# Patient Record
Sex: FEMALE | Race: WHITE | NOT HISPANIC OR LATINO | Employment: FULL TIME | URBAN - METROPOLITAN AREA
[De-identification: names, ages, dates, MRNs, and addresses within clinical notes are randomized per-mention and may not be internally consistent; named-entity substitution may affect disease eponyms.]

---

## 2017-01-24 ENCOUNTER — COMMUNICATION - HEALTHEAST (OUTPATIENT)
Dept: FAMILY MEDICINE | Facility: CLINIC | Age: 52
End: 2017-01-24

## 2017-03-09 ENCOUNTER — COMMUNICATION - HEALTHEAST (OUTPATIENT)
Dept: SCHEDULING | Facility: CLINIC | Age: 52
End: 2017-03-09

## 2018-01-04 ENCOUNTER — RECORDS - HEALTHEAST (OUTPATIENT)
Dept: ADMINISTRATIVE | Facility: OTHER | Age: 53
End: 2018-01-04

## 2018-01-17 ENCOUNTER — HOSPITAL ENCOUNTER (OUTPATIENT)
Dept: MAMMOGRAPHY | Facility: HOSPITAL | Age: 53
Discharge: HOME | End: 2018-01-17

## 2018-01-17 DIAGNOSIS — Z12.31 VISIT FOR SCREENING MAMMOGRAM: ICD-10-CM

## 2019-06-03 ENCOUNTER — COMMUNICATION - HEALTHEAST (OUTPATIENT)
Dept: FAMILY MEDICINE | Facility: CLINIC | Age: 54
End: 2019-06-03

## 2019-06-03 ENCOUNTER — COMMUNICATION - HEALTHEAST (OUTPATIENT)
Dept: SCHEDULING | Facility: CLINIC | Age: 54
End: 2019-06-03

## 2019-06-03 ENCOUNTER — OFFICE VISIT - HEALTHEAST (OUTPATIENT)
Dept: FAMILY MEDICINE | Facility: CLINIC | Age: 54
End: 2019-06-03

## 2019-06-03 DIAGNOSIS — R10.11 RUQ ABDOMINAL PAIN: ICD-10-CM

## 2019-06-03 DIAGNOSIS — Z12.31 VISIT FOR SCREENING MAMMOGRAM: ICD-10-CM

## 2019-06-03 DIAGNOSIS — K58.0 IRRITABLE BOWEL SYNDROME WITH DIARRHEA: ICD-10-CM

## 2019-06-03 DIAGNOSIS — R63.5 WEIGHT GAIN: ICD-10-CM

## 2019-06-03 DIAGNOSIS — Z87.442 HISTORY OF KIDNEY STONES: ICD-10-CM

## 2019-06-03 DIAGNOSIS — Z12.11 SCREENING FOR COLON CANCER: ICD-10-CM

## 2019-06-03 DIAGNOSIS — R10.9 RIGHT FLANK PAIN: ICD-10-CM

## 2019-06-03 DIAGNOSIS — Z87.19 HISTORY OF BILIARY COLIC: ICD-10-CM

## 2019-06-03 LAB
ALBUMIN SERPL-MCNC: 3.7 G/DL (ref 3.5–5)
ALBUMIN UR-MCNC: ABNORMAL MG/DL
ALP SERPL-CCNC: 160 U/L (ref 45–120)
ALT SERPL W P-5'-P-CCNC: 23 U/L (ref 0–45)
ANION GAP SERPL CALCULATED.3IONS-SCNC: 7 MMOL/L (ref 5–18)
APPEARANCE UR: ABNORMAL
AST SERPL W P-5'-P-CCNC: 17 U/L (ref 0–40)
BACTERIA #/AREA URNS HPF: ABNORMAL HPF
BASOPHILS # BLD AUTO: 0.1 THOU/UL (ref 0–0.2)
BASOPHILS NFR BLD AUTO: 1 % (ref 0–2)
BILIRUB SERPL-MCNC: 0.3 MG/DL (ref 0–1)
BILIRUB UR QL STRIP: ABNORMAL
BUN SERPL-MCNC: 14 MG/DL (ref 8–22)
CALCIUM SERPL-MCNC: 9.6 MG/DL (ref 8.5–10.5)
CHLORIDE BLD-SCNC: 105 MMOL/L (ref 98–107)
CO2 SERPL-SCNC: 27 MMOL/L (ref 22–31)
COLOR UR AUTO: ABNORMAL
CREAT SERPL-MCNC: 0.77 MG/DL (ref 0.6–1.1)
EOSINOPHIL # BLD AUTO: 0.2 THOU/UL (ref 0–0.4)
EOSINOPHIL NFR BLD AUTO: 2 % (ref 0–6)
ERYTHROCYTE [DISTWIDTH] IN BLOOD BY AUTOMATED COUNT: 11.9 % (ref 11–14.5)
GFR SERPL CREATININE-BSD FRML MDRD: >60 ML/MIN/1.73M2
GLUCOSE BLD-MCNC: 97 MG/DL (ref 70–125)
GLUCOSE UR STRIP-MCNC: NEGATIVE MG/DL
HCT VFR BLD AUTO: 40.8 % (ref 35–47)
HGB BLD-MCNC: 13.7 G/DL (ref 12–16)
HGB UR QL STRIP: ABNORMAL
KETONES UR STRIP-MCNC: ABNORMAL MG/DL
LEUKOCYTE ESTERASE UR QL STRIP: ABNORMAL
LIPASE SERPL-CCNC: 125 U/L (ref 0–52)
LYMPHOCYTES # BLD AUTO: 2.3 THOU/UL (ref 0.8–4.4)
LYMPHOCYTES NFR BLD AUTO: 24 % (ref 20–40)
MCH RBC QN AUTO: 29.6 PG (ref 27–34)
MCHC RBC AUTO-ENTMCNC: 33.4 G/DL (ref 32–36)
MCV RBC AUTO: 88 FL (ref 80–100)
MONOCYTES # BLD AUTO: 0.6 THOU/UL (ref 0–0.9)
MONOCYTES NFR BLD AUTO: 6 % (ref 2–10)
NEUTROPHILS # BLD AUTO: 6.6 THOU/UL (ref 2–7.7)
NEUTROPHILS NFR BLD AUTO: 68 % (ref 50–70)
NITRATE UR QL: NEGATIVE
PH UR STRIP: 5.5 [PH] (ref 5–8)
PLATELET # BLD AUTO: 204 THOU/UL (ref 140–440)
PMV BLD AUTO: 9.1 FL (ref 7–10)
POTASSIUM BLD-SCNC: 3.6 MMOL/L (ref 3.5–5)
PROT SERPL-MCNC: 7.2 G/DL (ref 6–8)
RBC # BLD AUTO: 4.62 MILL/UL (ref 3.8–5.4)
RBC #/AREA URNS AUTO: >100 HPF
SODIUM SERPL-SCNC: 139 MMOL/L (ref 136–145)
SP GR UR STRIP: >=1.03 (ref 1–1.03)
SQUAMOUS #/AREA URNS AUTO: ABNORMAL LPF
TSH SERPL DL<=0.005 MIU/L-ACNC: 2.97 UIU/ML (ref 0.3–5)
UROBILINOGEN UR STRIP-ACNC: ABNORMAL
WBC #/AREA URNS AUTO: ABNORMAL HPF
WBC: 9.7 THOU/UL (ref 4–11)

## 2019-06-05 ENCOUNTER — COMMUNICATION - HEALTHEAST (OUTPATIENT)
Dept: FAMILY MEDICINE | Facility: CLINIC | Age: 54
End: 2019-06-05

## 2019-06-05 ENCOUNTER — AMBULATORY - HEALTHEAST (OUTPATIENT)
Dept: FAMILY MEDICINE | Facility: CLINIC | Age: 54
End: 2019-06-05

## 2019-06-05 ENCOUNTER — AMBULATORY - HEALTHEAST (OUTPATIENT)
Dept: UROLOGY | Facility: CLINIC | Age: 54
End: 2019-06-05

## 2019-06-05 ENCOUNTER — HOSPITAL ENCOUNTER (OUTPATIENT)
Dept: CT IMAGING | Facility: CLINIC | Age: 54
Discharge: HOME | End: 2019-06-05
Attending: FAMILY MEDICINE

## 2019-06-05 ENCOUNTER — HOSPITAL ENCOUNTER (OUTPATIENT)
Dept: ULTRASOUND IMAGING | Facility: CLINIC | Age: 54
Discharge: HOME | End: 2019-06-05
Attending: FAMILY MEDICINE

## 2019-06-05 DIAGNOSIS — N20.0 NEPHROLITHIASIS: ICD-10-CM

## 2019-06-05 DIAGNOSIS — R10.9 RIGHT FLANK PAIN: ICD-10-CM

## 2019-06-05 DIAGNOSIS — R82.71 GROUP B STREPTOCOCCAL BACTERIURIA: ICD-10-CM

## 2019-06-06 ENCOUNTER — OFFICE VISIT - HEALTHEAST (OUTPATIENT)
Dept: UROLOGY | Facility: CLINIC | Age: 54
End: 2019-06-06

## 2019-06-06 DIAGNOSIS — Z87.442 HISTORY OF KIDNEY STONES: ICD-10-CM

## 2019-06-06 DIAGNOSIS — N20.1 CALCULUS OF URETER: ICD-10-CM

## 2019-06-06 DIAGNOSIS — N30.01 ACUTE CYSTITIS WITH HEMATURIA: ICD-10-CM

## 2019-06-06 DIAGNOSIS — N13.2 HYDRONEPHROSIS WITH URINARY OBSTRUCTION DUE TO URETERAL CALCULUS: ICD-10-CM

## 2019-06-06 DIAGNOSIS — N20.0 NEPHROLITHIASIS: ICD-10-CM

## 2019-06-06 LAB
ALBUMIN UR-MCNC: ABNORMAL MG/DL
APPEARANCE UR: ABNORMAL
BILIRUB UR QL STRIP: NEGATIVE
COLOR UR AUTO: YELLOW
GLUCOSE UR STRIP-MCNC: NEGATIVE MG/DL
HGB UR QL STRIP: ABNORMAL
KETONES UR STRIP-MCNC: NEGATIVE MG/DL
LEUKOCYTE ESTERASE UR QL STRIP: ABNORMAL
NITRATE UR QL: NEGATIVE
PH UR STRIP: 6 [PH] (ref 5–8)
SP GR UR STRIP: 1.02 (ref 1–1.03)
UROBILINOGEN UR STRIP-ACNC: ABNORMAL

## 2019-06-07 ENCOUNTER — OFFICE VISIT - HEALTHEAST (OUTPATIENT)
Dept: SURGERY | Facility: CLINIC | Age: 54
End: 2019-06-07

## 2019-06-07 DIAGNOSIS — K81.9 CHOLECYSTITIS: ICD-10-CM

## 2019-06-07 DIAGNOSIS — N20.0 KIDNEY STONES: ICD-10-CM

## 2019-06-07 DIAGNOSIS — K85.10 ACUTE BILIARY PANCREATITIS WITHOUT INFECTION OR NECROSIS: ICD-10-CM

## 2019-06-07 ASSESSMENT — MIFFLIN-ST. JEOR: SCORE: 1553.9

## 2019-06-08 LAB — BACTERIA SPEC CULT: ABNORMAL

## 2019-06-10 ENCOUNTER — HOSPITAL ENCOUNTER (OUTPATIENT)
Dept: MAMMOGRAPHY | Facility: CLINIC | Age: 54
Discharge: HOME | End: 2019-06-10
Attending: FAMILY MEDICINE

## 2019-06-10 DIAGNOSIS — Z12.31 VISIT FOR SCREENING MAMMOGRAM: ICD-10-CM

## 2019-06-11 ENCOUNTER — SURGERY - HEALTHEAST (OUTPATIENT)
Dept: SURGERY | Facility: CLINIC | Age: 54
End: 2019-06-11

## 2019-06-11 ENCOUNTER — ANESTHESIA - HEALTHEAST (OUTPATIENT)
Dept: SURGERY | Facility: CLINIC | Age: 54
End: 2019-06-11

## 2019-06-11 ASSESSMENT — MIFFLIN-ST. JEOR: SCORE: 1549.36

## 2019-06-17 ENCOUNTER — COMMUNICATION - HEALTHEAST (OUTPATIENT)
Dept: SCHEDULING | Facility: CLINIC | Age: 54
End: 2019-06-17

## 2019-06-17 ENCOUNTER — AMBULATORY - HEALTHEAST (OUTPATIENT)
Dept: UROLOGY | Facility: CLINIC | Age: 54
End: 2019-06-17

## 2019-06-17 DIAGNOSIS — N20.1 CALCULUS OF URETER: ICD-10-CM

## 2019-06-17 LAB
ALBUMIN UR-MCNC: ABNORMAL MG/DL
APPEARANCE UR: ABNORMAL
BILIRUB UR QL STRIP: ABNORMAL
COLOR UR AUTO: ABNORMAL
GLUCOSE UR STRIP-MCNC: NEGATIVE MG/DL
HGB UR QL STRIP: ABNORMAL
KETONES UR STRIP-MCNC: ABNORMAL MG/DL
LEUKOCYTE ESTERASE UR QL STRIP: ABNORMAL
NITRATE UR QL: NEGATIVE
PH UR STRIP: 7 [PH] (ref 5–8)
SP GR UR STRIP: 1.02 (ref 1–1.03)
UROBILINOGEN UR STRIP-ACNC: ABNORMAL

## 2019-06-18 LAB — BACTERIA SPEC CULT: NO GROWTH

## 2019-07-05 ENCOUNTER — COMMUNICATION - HEALTHEAST (OUTPATIENT)
Dept: UROLOGY | Facility: CLINIC | Age: 54
End: 2019-07-05

## 2019-07-11 ENCOUNTER — OFFICE VISIT - HEALTHEAST (OUTPATIENT)
Dept: UROLOGY | Facility: CLINIC | Age: 54
End: 2019-07-11

## 2019-07-11 ENCOUNTER — AMBULATORY - HEALTHEAST (OUTPATIENT)
Dept: LAB | Facility: CLINIC | Age: 54
End: 2019-07-11

## 2019-07-11 DIAGNOSIS — N20.1 CALCULUS OF URETER: ICD-10-CM

## 2019-07-11 LAB
ALBUMIN UR-MCNC: NEGATIVE MG/DL
APPEARANCE UR: CLEAR
BILIRUB UR QL STRIP: NEGATIVE
COLOR UR AUTO: YELLOW
GLUCOSE UR STRIP-MCNC: NEGATIVE MG/DL
HGB UR QL STRIP: ABNORMAL
KETONES UR STRIP-MCNC: ABNORMAL MG/DL
LEUKOCYTE ESTERASE UR QL STRIP: NEGATIVE
NITRATE UR QL: NEGATIVE
PH UR STRIP: 5 [PH] (ref 5–8)
SP GR UR STRIP: >=1.03 (ref 1–1.03)
UROBILINOGEN UR STRIP-ACNC: ABNORMAL

## 2019-07-15 ENCOUNTER — COMMUNICATION - HEALTHEAST (OUTPATIENT)
Dept: UROLOGY | Facility: CLINIC | Age: 54
End: 2019-07-15

## 2019-07-15 ENCOUNTER — AMBULATORY - HEALTHEAST (OUTPATIENT)
Dept: LAB | Facility: CLINIC | Age: 54
End: 2019-07-15

## 2019-07-15 DIAGNOSIS — N20.1 CALCULUS OF URETER: ICD-10-CM

## 2019-07-16 LAB
APPEARANCE STONE: NORMAL
COMPN STONE: NORMAL
NUMBER STONE: 1
SIZE STONE: NORMAL MM
SPECIMEN WT: 20 MG

## 2019-08-19 ENCOUNTER — AMBULATORY - HEALTHEAST (OUTPATIENT)
Dept: LAB | Facility: CLINIC | Age: 54
End: 2019-08-19

## 2019-08-19 DIAGNOSIS — N20.1 CALCULUS OF URETER: ICD-10-CM

## 2019-08-19 LAB
CALCIUM 24H UR-MRATE: 342 MG/24HR (ref 20–275)
CHLORIDE 24H UR-SRATE: 103 MMOL/24HR (ref 110–250)
CITRATE 24H UR-MCNC: 726 MG/24HR
CREATININE, 24 HR URINE - HISTORICAL: 1134.4 MG/24HR
MAGNESIUM 24H UR-MRATE: 106 MG/24 HR (ref 75–150)
MAGNESIUM UR-MCNC: 7.7 MG/DL
OXALATE MG/SPEC: 21.9 MG/24HR (ref 7–44)
PH UR STRIP: 6 [PH] (ref 4.5–8)
PHOSPHORUS URINE MG/SPEC: 776.9 MG/24HR
POTASSIUM 24H UR-SCNC: 33 MMOL/24HR (ref 30–90)
SODIUM 24H UR-SRATE: 110 MMOL/24HR (ref 40–217)
SPECIMEN VOL UR: 1375 ML
SPECIMEN VOL UR: 1375 ML
URIC ACID URINE MG/SPEC: 441 MG/24HR (ref 250–750)

## 2019-09-23 ENCOUNTER — AMBULATORY - HEALTHEAST (OUTPATIENT)
Dept: LAB | Facility: CLINIC | Age: 54
End: 2019-09-23

## 2019-09-23 DIAGNOSIS — N20.1 CALCULUS OF URETER: ICD-10-CM

## 2019-09-23 LAB
MAGNESIUM 24H UR-MRATE: 103 MG/24 HR (ref 75–150)
MAGNESIUM UR-MCNC: 8.2 MG/DL
SPECIMEN VOL UR: 1250 ML

## 2019-09-24 LAB
CALCIUM 24H UR-MRATE: 326 MG/24HR (ref 20–275)
CHLORIDE 24H UR-SRATE: 143 MMOL/24HR (ref 110–250)
CITRATE 24H UR-MCNC: 527 MG/24HR
CREATININE, 24 HR URINE - HISTORICAL: 1032.5 MG/24HR
OXALATE MG/SPEC: 19.6 MG/24HR (ref 7–44)
PH UR STRIP: 6.5 [PH] (ref 4.5–8)
PHOSPHORUS URINE MG/SPEC: 492.5 MG/24HR
POTASSIUM 24H UR-SCNC: 34 MMOL/24HR (ref 30–90)
SODIUM 24H UR-SRATE: 145 MMOL/24HR (ref 40–217)
SPECIMEN VOL UR: 1250 ML
URIC ACID URINE MG/SPEC: 383 MG/24HR (ref 250–750)

## 2019-10-10 ENCOUNTER — OFFICE VISIT - HEALTHEAST (OUTPATIENT)
Dept: UROLOGY | Facility: CLINIC | Age: 54
End: 2019-10-10

## 2019-10-10 DIAGNOSIS — N20.0 CALCULUS OF KIDNEY: ICD-10-CM

## 2019-10-10 DIAGNOSIS — R34 LOW URINE OUTPUT: ICD-10-CM

## 2019-10-10 DIAGNOSIS — R82.998 HIGH URINE SODIUM: ICD-10-CM

## 2019-10-10 DIAGNOSIS — R82.994 HYPERCALCIURIA: ICD-10-CM

## 2019-10-10 LAB
ALBUMIN UR-MCNC: NEGATIVE MG/DL
APPEARANCE UR: CLEAR
BILIRUB UR QL STRIP: NEGATIVE
COLOR UR AUTO: YELLOW
GLUCOSE UR STRIP-MCNC: NEGATIVE MG/DL
HGB UR QL STRIP: ABNORMAL
KETONES UR STRIP-MCNC: NEGATIVE MG/DL
LEUKOCYTE ESTERASE UR QL STRIP: NEGATIVE
NITRATE UR QL: NEGATIVE
PH UR STRIP: 6 [PH] (ref 5–8)
SP GR UR STRIP: 1.02 (ref 1–1.03)
UROBILINOGEN UR STRIP-ACNC: ABNORMAL

## 2020-01-02 ENCOUNTER — COMMUNICATION - HEALTHEAST (OUTPATIENT)
Dept: FAMILY MEDICINE | Facility: CLINIC | Age: 55
End: 2020-01-02

## 2020-01-02 DIAGNOSIS — Z12.11 SCREENING FOR COLON CANCER: ICD-10-CM

## 2020-02-10 ENCOUNTER — COMMUNICATION - HEALTHEAST (OUTPATIENT)
Dept: SCHEDULING | Facility: CLINIC | Age: 55
End: 2020-02-10

## 2020-02-10 ENCOUNTER — OFFICE VISIT - HEALTHEAST (OUTPATIENT)
Dept: FAMILY MEDICINE | Facility: CLINIC | Age: 55
End: 2020-02-10

## 2020-02-10 DIAGNOSIS — I49.9 IRREGULAR HEART BEAT: ICD-10-CM

## 2020-02-11 ENCOUNTER — COMMUNICATION - HEALTHEAST (OUTPATIENT)
Dept: FAMILY MEDICINE | Facility: CLINIC | Age: 55
End: 2020-02-11

## 2020-02-11 DIAGNOSIS — R00.2 PALPITATIONS: ICD-10-CM

## 2020-02-11 DIAGNOSIS — R00.0 RACING HEART BEAT: ICD-10-CM

## 2020-02-11 DIAGNOSIS — K58.0 IRRITABLE BOWEL SYNDROME WITH DIARRHEA: ICD-10-CM

## 2020-02-11 DIAGNOSIS — Z87.19 HISTORY OF BILIARY COLIC: ICD-10-CM

## 2020-02-11 DIAGNOSIS — Z12.11 SCREENING FOR COLON CANCER: ICD-10-CM

## 2020-02-11 DIAGNOSIS — R63.5 WEIGHT GAIN: ICD-10-CM

## 2020-02-11 DIAGNOSIS — Z87.442 HISTORY OF KIDNEY STONES: ICD-10-CM

## 2020-02-11 LAB
ATRIAL RATE - MUSE: 62 BPM
DIASTOLIC BLOOD PRESSURE - MUSE: NORMAL
INTERPRETATION ECG - MUSE: NORMAL
P AXIS - MUSE: -4 DEGREES
PR INTERVAL - MUSE: 168 MS
QRS DURATION - MUSE: 86 MS
QT - MUSE: 418 MS
QTC - MUSE: 424 MS
R AXIS - MUSE: 9 DEGREES
SYSTOLIC BLOOD PRESSURE - MUSE: NORMAL
T AXIS - MUSE: 14 DEGREES
VENTRICULAR RATE- MUSE: 62 BPM

## 2020-02-17 ENCOUNTER — HOSPITAL ENCOUNTER (OUTPATIENT)
Dept: CARDIOLOGY | Facility: CLINIC | Age: 55
Discharge: HOME | End: 2020-02-17
Attending: FAMILY MEDICINE

## 2020-02-17 ENCOUNTER — RECORDS - HEALTHEAST (OUTPATIENT)
Dept: ADMINISTRATIVE | Facility: OTHER | Age: 55
End: 2020-02-17

## 2020-02-17 DIAGNOSIS — R00.2 PALPITATIONS: ICD-10-CM

## 2020-02-17 DIAGNOSIS — R00.0 RACING HEART BEAT: ICD-10-CM

## 2020-02-19 ENCOUNTER — COMMUNICATION - HEALTHEAST (OUTPATIENT)
Dept: FAMILY MEDICINE | Facility: CLINIC | Age: 55
End: 2020-02-19

## 2020-02-19 DIAGNOSIS — I48.92 ATRIAL FLUTTER, UNSPECIFIED TYPE (H): ICD-10-CM

## 2020-02-20 ENCOUNTER — AMBULATORY - HEALTHEAST (OUTPATIENT)
Dept: LAB | Facility: CLINIC | Age: 55
End: 2020-02-20

## 2020-02-20 DIAGNOSIS — I48.92 ATRIAL FLUTTER, UNSPECIFIED TYPE (H): ICD-10-CM

## 2020-02-20 DIAGNOSIS — R73.09 ELEVATED GLUCOSE: ICD-10-CM

## 2020-02-20 LAB
ANION GAP SERPL CALCULATED.3IONS-SCNC: 8 MMOL/L (ref 5–18)
BUN SERPL-MCNC: 11 MG/DL (ref 8–22)
CALCIUM SERPL-MCNC: 9.2 MG/DL (ref 8.5–10.5)
CHLORIDE BLD-SCNC: 107 MMOL/L (ref 98–107)
CO2 SERPL-SCNC: 25 MMOL/L (ref 22–31)
CREAT SERPL-MCNC: 0.88 MG/DL (ref 0.6–1.1)
ERYTHROCYTE [DISTWIDTH] IN BLOOD BY AUTOMATED COUNT: 13.3 % (ref 11–14.5)
FERRITIN SERPL-MCNC: 40 NG/ML (ref 10–130)
GFR SERPL CREATININE-BSD FRML MDRD: >60 ML/MIN/1.73M2
GLUCOSE BLD-MCNC: 133 MG/DL (ref 70–125)
HCT VFR BLD AUTO: 40.2 % (ref 35–47)
HGB BLD-MCNC: 13.1 G/DL (ref 12–16)
MCH RBC QN AUTO: 29.5 PG (ref 27–34)
MCHC RBC AUTO-ENTMCNC: 32.6 G/DL (ref 32–36)
MCV RBC AUTO: 91 FL (ref 80–100)
PLATELET # BLD AUTO: 198 THOU/UL (ref 140–440)
PMV BLD AUTO: 11.4 FL (ref 8.5–12.5)
POTASSIUM BLD-SCNC: 4.3 MMOL/L (ref 3.5–5)
RBC # BLD AUTO: 4.44 MILL/UL (ref 3.8–5.4)
SODIUM SERPL-SCNC: 140 MMOL/L (ref 136–145)
TSH SERPL DL<=0.005 MIU/L-ACNC: 4.52 UIU/ML (ref 0.3–5)
WBC: 4.7 THOU/UL (ref 4–11)

## 2020-02-21 ENCOUNTER — OFFICE VISIT - HEALTHEAST (OUTPATIENT)
Dept: CARDIOLOGY | Facility: CLINIC | Age: 55
End: 2020-02-21

## 2020-02-21 DIAGNOSIS — I48.92 ATRIAL FLUTTER (H): ICD-10-CM

## 2020-02-21 LAB — HBA1C MFR BLD: 5.4 % (ref 4.2–6.1)

## 2020-02-21 ASSESSMENT — MIFFLIN-ST. JEOR: SCORE: 1581.11

## 2020-02-24 ENCOUNTER — HOSPITAL ENCOUNTER (OUTPATIENT)
Dept: CARDIOLOGY | Facility: CLINIC | Age: 55
Discharge: HOME | End: 2020-02-24
Attending: INTERNAL MEDICINE

## 2020-02-24 DIAGNOSIS — I48.92 ATRIAL FLUTTER (H): ICD-10-CM

## 2020-02-24 ASSESSMENT — MIFFLIN-ST. JEOR: SCORE: 1581.11

## 2020-02-25 ENCOUNTER — COMMUNICATION - HEALTHEAST (OUTPATIENT)
Dept: CARDIOLOGY | Facility: CLINIC | Age: 55
End: 2020-02-25

## 2020-02-25 LAB
AORTIC ROOT: 3.3 CM
BSA FOR ECHO PROCEDURE: 2.11 M2
CV BLOOD PRESSURE: ABNORMAL MMHG
CV ECHO HEIGHT: 65 IN
CV ECHO WEIGHT: 215 LBS
DOP CALC LVOT AREA: 2.83 CM2
DOP CALC LVOT DIAMETER: 1.9 CM
DOP CALC LVOT PEAK VEL: 104 CM/S
DOP CALC LVOT STROKE VOLUME: 66.9 CM3
DOP CALCLVOT PEAK VEL VTI: 23.6 CM
EJECTION FRACTION: 58 % (ref 55–75)
FRACTIONAL SHORTENING: 38.3 % (ref 28–44)
INTERVENTRICULAR SEPTUM IN END DIASTOLE: 0.8 CM (ref 0.6–0.9)
IVS/PW RATIO: 1.1
LA AREA 1: 17.8 CM2
LA AREA 2: 18.6 CM2
LEFT ATRIUM LENGTH: 5.34 CM
LEFT ATRIUM SIZE: 3.8 CM
LEFT ATRIUM TO AORTIC ROOT RATIO: 1.15 NO UNITS
LEFT ATRIUM VOLUME INDEX: 25 ML/M2
LEFT ATRIUM VOLUME: 52.7 ML
LEFT VENTRICLE CARDIAC INDEX: 2.2 L/MIN/M2
LEFT VENTRICLE CARDIAC OUTPUT: 4.7 L/MIN
LEFT VENTRICLE DIASTOLIC VOLUME INDEX: 61.1 CM3/M2 (ref 29–61)
LEFT VENTRICLE DIASTOLIC VOLUME: 129 CM3 (ref 46–106)
LEFT VENTRICLE HEART RATE: 70 BPM
LEFT VENTRICLE MASS INDEX: 53.3 G/M2
LEFT VENTRICLE SYSTOLIC VOLUME INDEX: 25.6 CM3/M2 (ref 8–24)
LEFT VENTRICLE SYSTOLIC VOLUME: 54 CM3 (ref 14–42)
LEFT VENTRICULAR INTERNAL DIMENSION IN DIASTOLE: 4.7 CM (ref 3.8–5.2)
LEFT VENTRICULAR INTERNAL DIMENSION IN SYSTOLE: 2.9 CM (ref 2.2–3.5)
LEFT VENTRICULAR MASS: 112.5 G
LEFT VENTRICULAR OUTFLOW TRACT MEAN GRADIENT: 2 MMHG
LEFT VENTRICULAR OUTFLOW TRACT MEAN VELOCITY: 71.6 CM/S
LEFT VENTRICULAR OUTFLOW TRACT PEAK GRADIENT: 4 MMHG
LEFT VENTRICULAR POSTERIOR WALL IN END DIASTOLE: 0.7 CM (ref 0.6–0.9)
LV STROKE VOLUME INDEX: 31.7 ML/M2
MITRAL VALVE E/A RATIO: 1.4
MV AVERAGE E/E' RATIO: 4.8 CM/S
MV DECELERATION TIME: 165 MS
MV E'TISSUE VEL-LAT: 14.7 CM/S
MV E'TISSUE VEL-MED: 11.2 CM/S
MV LATERAL E/E' RATIO: 4.2
MV MEDIAL E/E' RATIO: 5.5
MV PEAK A VELOCITY: 45.2 CM/S
MV PEAK E VELOCITY: 61.7 CM/S
NUC REST DIASTOLIC VOLUME INDEX: 3440 LBS
NUC REST SYSTOLIC VOLUME INDEX: 65 IN
TRICUSPID REGURGITATION PEAK PRESSURE GRADIENT: 18.8 MMHG
TRICUSPID VALVE ANULAR PLANE SYSTOLIC EXCURSION: 2.3 CM
TRICUSPID VALVE PEAK REGURGITANT VELOCITY: 217 CM/S

## 2020-03-05 ENCOUNTER — OFFICE VISIT - HEALTHEAST (OUTPATIENT)
Dept: CARDIOLOGY | Facility: CLINIC | Age: 55
End: 2020-03-05

## 2020-03-05 DIAGNOSIS — I48.0 PAROXYSMAL ATRIAL FIBRILLATION (H): ICD-10-CM

## 2020-05-12 ENCOUNTER — COMMUNICATION - HEALTHEAST (OUTPATIENT)
Dept: FAMILY MEDICINE | Facility: CLINIC | Age: 55
End: 2020-05-12

## 2020-05-12 DIAGNOSIS — B02.9 HERPES ZOSTER WITHOUT COMPLICATION: ICD-10-CM

## 2020-10-19 ENCOUNTER — OFFICE VISIT - HEALTHEAST (OUTPATIENT)
Dept: CARDIOLOGY | Facility: CLINIC | Age: 55
End: 2020-10-19

## 2020-10-19 DIAGNOSIS — I48.0 PAROXYSMAL ATRIAL FIBRILLATION (H): ICD-10-CM

## 2020-10-19 ASSESSMENT — MIFFLIN-ST. JEOR: SCORE: 1589.28

## 2020-11-21 ENCOUNTER — RECORDS - HEALTHEAST (OUTPATIENT)
Dept: ADMINISTRATIVE | Facility: OTHER | Age: 55
End: 2020-11-21

## 2020-12-03 ENCOUNTER — COMMUNICATION - HEALTHEAST (OUTPATIENT)
Dept: CARDIOLOGY | Facility: CLINIC | Age: 55
End: 2020-12-03

## 2020-12-03 DIAGNOSIS — I48.0 PAROXYSMAL ATRIAL FIBRILLATION (H): ICD-10-CM

## 2020-12-21 ENCOUNTER — HOSPITAL ENCOUNTER (OUTPATIENT)
Dept: NUCLEAR MEDICINE | Facility: CLINIC | Age: 55
Discharge: HOME | End: 2020-12-21
Attending: NURSE PRACTITIONER

## 2020-12-21 ENCOUNTER — HOSPITAL ENCOUNTER (OUTPATIENT)
Dept: CARDIOLOGY | Facility: CLINIC | Age: 55
Discharge: HOME | End: 2020-12-21
Attending: NURSE PRACTITIONER

## 2020-12-21 DIAGNOSIS — I48.0 PAROXYSMAL ATRIAL FIBRILLATION (H): ICD-10-CM

## 2020-12-21 LAB
CV STRESS CURRENT BP HE: NORMAL
CV STRESS CURRENT HR HE: 100
CV STRESS CURRENT HR HE: 100
CV STRESS CURRENT HR HE: 101
CV STRESS CURRENT HR HE: 101
CV STRESS CURRENT HR HE: 102
CV STRESS CURRENT HR HE: 108
CV STRESS CURRENT HR HE: 109
CV STRESS CURRENT HR HE: 113
CV STRESS CURRENT HR HE: 118
CV STRESS CURRENT HR HE: 132
CV STRESS CURRENT HR HE: 135
CV STRESS CURRENT HR HE: 136
CV STRESS CURRENT HR HE: 137
CV STRESS CURRENT HR HE: 140
CV STRESS CURRENT HR HE: 141
CV STRESS CURRENT HR HE: 141
CV STRESS CURRENT HR HE: 142
CV STRESS CURRENT HR HE: 144
CV STRESS CURRENT HR HE: 146
CV STRESS CURRENT HR HE: 147
CV STRESS CURRENT HR HE: 148
CV STRESS CURRENT HR HE: 148
CV STRESS CURRENT HR HE: 149
CV STRESS CURRENT HR HE: 96
CV STRESS DEVIATION TIME HE: NORMAL
CV STRESS ECHO PERCENT HR HE: NORMAL
CV STRESS EXERCISE STAGE HE: NORMAL
CV STRESS EXERCISE STAGE REACHED HE: NORMAL
CV STRESS FINAL RESTING BP HE: NORMAL
CV STRESS FINAL RESTING HR HE: 100
CV STRESS MAX HR HE: 163
CV STRESS MAX TREADMILL GRADE HE: 14
CV STRESS MAX TREADMILL SPEED HE: 3.4
CV STRESS PEAK DIA BP HE: NORMAL
CV STRESS PEAK SYS BP HE: NORMAL
CV STRESS PHASE HE: NORMAL
CV STRESS PROTOCOL HE: NORMAL
CV STRESS RESTING PT POSITION HE: NORMAL
CV STRESS RESTING PT POSITION HE: NORMAL
CV STRESS ST DEVIATION AMOUNT HE: NORMAL
CV STRESS ST DEVIATION ELEVATION HE: NORMAL
CV STRESS ST EVELATION AMOUNT HE: NORMAL
CV STRESS TEST TYPE HE: NORMAL
CV STRESS TOTAL STAGE TIME MIN 1 HE: NORMAL
RATE PRESSURE PRODUCT: NORMAL
STRESS ECHO BASELINE DIASTOLIC HE: 67
STRESS ECHO BASELINE HR: 119
STRESS ECHO BASELINE SYSTOLIC BP: 117
STRESS ECHO CALCULATED PERCENT HR: 98 %
STRESS ECHO LAST STRESS DIASTOLIC BP: 70
STRESS ECHO LAST STRESS HR: 146
STRESS ECHO LAST STRESS SYSTOLIC BP: 138
STRESS ECHO POST ESTIMATED WORKLOAD: 7.9
STRESS ECHO POST EXERCISE DUR MIN: 6
STRESS ECHO POST EXERCISE DUR SEC: 30
STRESS ECHO TARGET HR: 167

## 2020-12-22 ENCOUNTER — AMBULATORY - HEALTHEAST (OUTPATIENT)
Dept: CARDIOLOGY | Facility: CLINIC | Age: 55
End: 2020-12-22

## 2020-12-22 DIAGNOSIS — I48.0 PAROXYSMAL ATRIAL FIBRILLATION (H): ICD-10-CM

## 2021-01-19 ENCOUNTER — COMMUNICATION - HEALTHEAST (OUTPATIENT)
Dept: CARDIOLOGY | Facility: CLINIC | Age: 56
End: 2021-01-19

## 2021-01-20 ENCOUNTER — OFFICE VISIT - HEALTHEAST (OUTPATIENT)
Dept: CARDIOLOGY | Facility: CLINIC | Age: 56
End: 2021-01-20

## 2021-01-20 DIAGNOSIS — I48.0 PAROXYSMAL ATRIAL FIBRILLATION (H): ICD-10-CM

## 2021-01-25 ENCOUNTER — COMMUNICATION - HEALTHEAST (OUTPATIENT)
Dept: CARDIOLOGY | Facility: CLINIC | Age: 56
End: 2021-01-25

## 2021-01-26 ENCOUNTER — AMBULATORY - HEALTHEAST (OUTPATIENT)
Dept: CARDIOLOGY | Facility: CLINIC | Age: 56
End: 2021-01-26

## 2021-01-26 DIAGNOSIS — I48.0 PAROXYSMAL ATRIAL FIBRILLATION (H): ICD-10-CM

## 2021-02-19 ENCOUNTER — OFFICE VISIT - HEALTHEAST (OUTPATIENT)
Dept: CARDIOLOGY | Facility: CLINIC | Age: 56
End: 2021-02-19

## 2021-02-19 ENCOUNTER — COMMUNICATION - HEALTHEAST (OUTPATIENT)
Dept: CARDIOLOGY | Facility: CLINIC | Age: 56
End: 2021-02-19

## 2021-02-19 DIAGNOSIS — I48.0 PAROXYSMAL ATRIAL FIBRILLATION (H): ICD-10-CM

## 2021-02-19 RX ORDER — DILTIAZEM HYDROCHLORIDE 120 MG/1
120 CAPSULE, COATED, EXTENDED RELEASE ORAL DAILY
Qty: 90 CAPSULE | Refills: 3 | Status: SHIPPED | OUTPATIENT
Start: 2021-02-19 | End: 2021-07-29

## 2021-02-19 RX ORDER — FLECAINIDE ACETATE 100 MG/1
100 TABLET ORAL 2 TIMES DAILY
Qty: 180 TABLET | Refills: 3 | Status: SHIPPED | OUTPATIENT
Start: 2021-02-19 | End: 2021-07-29 | Stop reason: DRUGHIGH

## 2021-03-01 ENCOUNTER — AMBULATORY - HEALTHEAST (OUTPATIENT)
Dept: CARDIOLOGY | Facility: CLINIC | Age: 56
End: 2021-03-01

## 2021-03-01 DIAGNOSIS — I48.0 PAROXYSMAL ATRIAL FIBRILLATION (H): ICD-10-CM

## 2021-03-01 DIAGNOSIS — I48.0 PAROXYSMAL ATRIAL FIBRILLATION (H): Primary | ICD-10-CM

## 2021-04-21 ENCOUNTER — HOSPITAL ENCOUNTER (OUTPATIENT)
Dept: MRI IMAGING | Facility: HOSPITAL | Age: 56
Discharge: HOME | End: 2021-04-21
Attending: INTERNAL MEDICINE

## 2021-04-21 DIAGNOSIS — I48.0 PAROXYSMAL ATRIAL FIBRILLATION (H): ICD-10-CM

## 2021-04-23 LAB
CCTA EJECTION FRACTION: 83 %
CCTA INTERVENTRICULAR SETPUM: 0.9 CM (ref 0.6–1.1)
CCTA LEFT INTERNAL DIMENSION IN SYSTOLE: 2.4 CM (ref 2.1–4)
CCTA LEFT VENTRICULAR INTERNAL DIMENSION IN DIASTOLE: 4.7 CM (ref 3.5–6)
CCTA LEFT VENTRICULAR MASS: 132.32 G
CCTA POSTERIOR WALL: 0.8 CM (ref 0.6–1.1)
MR CARDIAC LEFT VENTRIAL CARDIAC INDEX: 2.3 L/MIN/M2 (ref 1.75–3.8)
MR CARDIAC LEFT VENTRICAL CARDIAC OUTPUT: 4.8 L/MIN (ref 2.8–8.8)
MR CARDIAC LEFT VENTRICULAR DIASTOLIC VOLUME INDEX: 56.19 ML/M2 (ref 41–81)
MR CARDIAC LEFT VENTRICULAR MASS INDEX: 55.21 G/M2 (ref 63–95)
MR CARDIAC LEFT VENTRICULAR MASS: 113 G (ref 75–175)
MR CARDIAC LEFT VENTRICULAR STROKE VOLUME INDEX: 31.27 ML/M2 (ref 26–56)
MR CARDIAC LEFT VENTRICULAR SYSTOLIC VOLUME INDEX: 24.92 ML/M2 (ref 12–20)
MR EJECTION FRACTION: 55.65 %
MR HEIGHT: 1.65 M
MR LEFT VENTRICULAR DYSTOLIC VOLUMEN: 115 ML (ref 52–141)
MR LEFT VENTRICULAR STROKE VOLUMEN: 64 ML (ref 33–97)
MR LEFT VENTRICULAR SYSTOLIC VOLUME: 51 ML (ref 13–51)
MR WEIGHT: 98.3 KG

## 2021-05-17 ENCOUNTER — COMMUNICATION - HEALTHEAST (OUTPATIENT)
Dept: CARDIOLOGY | Facility: CLINIC | Age: 56
End: 2021-05-17

## 2021-05-18 ENCOUNTER — AMBULATORY - HEALTHEAST (OUTPATIENT)
Dept: CARDIOLOGY | Facility: CLINIC | Age: 56
End: 2021-05-18

## 2021-05-18 ENCOUNTER — COMMUNICATION - HEALTHEAST (OUTPATIENT)
Dept: CARDIOLOGY | Facility: CLINIC | Age: 56
End: 2021-05-18

## 2021-05-18 ENCOUNTER — HOSPITAL ENCOUNTER (INPATIENT)
Facility: CLINIC | Age: 56
Setting detail: SURGERY ADMIT
End: 2021-05-18
Attending: INTERNAL MEDICINE | Admitting: INTERNAL MEDICINE
Payer: COMMERCIAL

## 2021-05-18 DIAGNOSIS — I48.0 PAROXYSMAL ATRIAL FIBRILLATION (H): ICD-10-CM

## 2021-05-26 ENCOUNTER — RECORDS - HEALTHEAST (OUTPATIENT)
Dept: ADMINISTRATIVE | Facility: CLINIC | Age: 56
End: 2021-05-26

## 2021-05-26 VITALS — SYSTOLIC BLOOD PRESSURE: 118 MMHG | DIASTOLIC BLOOD PRESSURE: 64 MMHG | HEART RATE: 70 BPM | TEMPERATURE: 98 F

## 2021-05-28 ENCOUNTER — RECORDS - HEALTHEAST (OUTPATIENT)
Dept: ADMINISTRATIVE | Facility: CLINIC | Age: 56
End: 2021-05-28

## 2021-05-29 NOTE — PROGRESS NOTES
Assessment/plan   Tania Kaufman is a 53 y.o. female who is new to my practice.    Tania was seen today for abdominal pain.    Diagnoses and all orders for this visit:    Right flank pain  RUQ abdominal pain  History of biliary colic  She reports today symptoms feel very similar to the episode July 2018 during which work-up revealed symptoms consistent with biliary colic given ultrasound findings of enlarged gallbladder with multiple gallstones, no evidence for choledocholithiasis. Famhx + cholelithiasis. Exam today: afebrile, negative holt's sign, mild RUQ abdominal pain, no flank pain. At this time I feel like she is likely suffering from a biliary colic and she has described multiple recurrences since the past year.  For this reason I feel she would benefit from seeing both nutritionist though she is already made significant dietary changes but additionally surgical evaluation as this is the first-line treatment recommendation for symptomatic biliary colic.    I recommended basic laboratory evaluation to include a CBC, CMP, lipase.  She strongly does not feel she is pregnant, LMP in October not sexually active since even before that time.  She is perimenopausal.    Of note, she does have a history of kidney stones and is reporting 4 days of brownish tinged blood in her urine without any other urinary sx.  She really feels her symptoms are most consistent with the previous gallstone issue and not similar in any way to her kidney stone issue. No CVA tenderness on exam today. For this reason we will order a urine and culture if indicated but start down the route of biliary colic work-up.  If the urine comes back showing evidence for infection or kidney stone which could additionally explain the right flank pain, then she would be appropriately treated.  We did discuss a covering physician may need to assist with some of these orders if it appears she may have a UTI (Bactrim reasonable option) or if she needs  CT imaging of her kidneys as I will be out on maternity leave in the near future.    -     HM1(CBC and Differential)  -     Comprehensive Metabolic Panel  -     US Abdomen Limited; Future  -     Urinalysis-UC if Indicated  -     Lipase  -     HM1 (CBC with Diff)  -     Ambulatory referral to General Surgery  -     Ambulatory referral to Nutrition Services    History of kidney stones  Has seen [facility] for work up. Never able to catch a stone. Reports h/o 6-7 kidney stones in past 14 years since having children.   Interested to meet with dietitian to further discuss dietary implications of kidney stones.  Encouraged increasing fluid hydration to at least 1.5-2L daily.  -     Ambulatory referral to Nutrition Services    Irritable bowel syndrome with diarrhea  Overall much improved with use of her fiber supplement, interested to meet with nutritionist. I did discuss the FODMAP diet approach and she may be good candidate for this given her IBS hx.   -     Ambulatory referral to Nutrition Services    Weight gain. Body mass index is 34.63 kg/m .  Risks of obesity were discussed, including co-morbidities such as diabetes, HTN, HLD, CAD and stroke.   - encouraged moderate physical activity of 150 minutes per week  - encouraged healthy dietary choices like eating real foods, increasing protein & vegetables, and watching portion sizes.  - referral to dietician  -     Thyroid Stimulating Hormone (TSH)    Screening for colon cancer  -     Cologuard by pt preference    Visit for screening mammogram  -     Mammo Screening Bilateral; Future, needs 3D for dense breasts      I spent 40 minutes with the patient, >50% of which was in counseling regarding the patient's medical issues as noted above.    Follow up: 1 week if not improved    Lashonda Arango MD    Subjective:      HPI: Tania Kaufman is a 53 y.o. female who is here for:    Chief Complaint   Patient presents with     Abdominal Pain     RUQ, has been seen 7/27/18 at urgency  room for gallstones       Right flank and abdominal pain: She called nurse triage today regarding right flank and back with a history of gallstones.   Started around 6AM and has been pretty constant until 12:30pm.  She describes it as a dull ache.  No fevers or vomiting.  Rating the pain 4/10. Now improved.     She was seen July 2018 with similar symptoms and diagnosed with biliary colic.  Ultrasound at that point confirmed presence of gallstones.  CBC, BMP, LFTs and lipase were unremarkable at that time aside from a minimally elevated AST and lipase.  Everyone in her family had gallbladder removed.   Has two children 14 and 15yo.     H/o IBS with diarrhea which is now noticeably improved due to major dietary changes in the past year since the gallbladder dx.    Has been watching her diet- no fried foods. Has cut down on red meat- two hamburgers in the past year. Focus on lean meats.   She is taking a fiber-wise shake through a company called [employer]. This is her typical dinner.  This has been life changing to resolverher h/o IBS-D.    No eggs. Whole wheat bread. Avoids milk/lettuce.     H/o kidney stones- has had 6-7 episodes in the past 14 years. Sx with this is usually is low suprapubic area and not flank pain. No urinary frequency. No dysuria.   Has noticed brownish blood in her urine for 4 days off and on however which she feels strongly is not vaginal in source.    Review of Systems:  Perimenopausal sx. No menses since Oct 31, 2018.   Weight gain. Was 140lbs 3 years ago doing P90x. Wondering if we can check TSH with other blood work today.    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     Medical History:  Patient Active Problem List   Diagnosis     Pigmented Purpuric Lichenoid Dermatitis     Gynecologic Services Intrauterine Device (IUD)     Generalized Anxiety Disorder     Diverticulitis of large intestine  without perforation or abscess without bleeding     Irritable bowel syndrome with diarrhea     History of biliary colic     History of kidney stones     Past Medical History:   Diagnosis Date     Kidney stone     at age 42     History reviewed. No pertinent surgical history.  Patient has no known allergies.  Family History   Problem Relation Age of Onset     Cancer Mother         skin     Pacemaker Mother         4 open heart surgeries      Diverticulitis Father      Breast cancer Other 40        great aunt     Urolithiasis Brother         recurrent       Medications:  Current Outpatient Medications   Medication Sig Dispense Refill     GLUCOSAMINE SULFATE (GLUCOSAMINE ORAL) Take by mouth. prn       L.ACID/L.CASEI/B.BIF/B.LUKE/FOS (PROBIOTIC BLEND ORAL) Take by mouth.       melatonin 3 mg Tab tablet Take 3 mg by mouth bedtime as needed.       multivitamin therapeutic (THERAGRAN) tablet Take 1 tablet by mouth daily.       NON FORMULARY              No current facility-administered medications for this visit.        Imaging & Labs reviewed this visit:   [facility]  US ABDOMEN LIMITED GALLBLADDER  7/27/2018 1:29 PM    INDICATION: Abdominal Pain  COMPARISON: None.    FINDINGS:  Gallbladder is mildly distended. There are multiple mobile gallstones. No  gallbladder wall thickening or sonographic Geronimo's sign. Common duct is normal  at 4 mm. No ascites.    CONCLUSION:  1.  Cholelithiasis without evidence of cholecystitis.      Objective:      Vitals:    06/03/19 1546   BP: 106/62   Pulse: 72   Temp: 98.5  F (36.9  C)   TempSrc: Oral   Weight: 208 lb 1.6 oz (94.4 kg)       Physical Exam:     General: Alert, no acute distress. Very chatty, nontoxic. Appears well.    HEENT: normocephalic. Oropharynx is moist and clear, without tonsillar hypertrophy, asymmetry, exudate or lesions.   Neck: supple without adenopathy or thyromegaly.  Lungs: Good aeration bilaterally. Clear to auscultation without wheezes, rales or  rhonci.   Heart: regular rate and rhythm, normal S1 and S2, no murmurs  Abdomen: soft, nondistended.  Obese.  Right upper quadrant is mildly tender to palpation, negative Geronimo sign however.  No guarding or rebound tenderness.  No rigidity.  Negative Rovsing sign.  No McBurney's point tenderness.  No flank pain.  Back: no CVA tenderness  Skin: clear without rash or lesions  Neuro: alert, interactive moving all extremities equally    Lashodna Arango MD

## 2021-05-29 NOTE — PATIENT INSTRUCTIONS - HE
Patient Stated Goal: Know what to expect after surgery  Ureteroscopy    Ureteroscopy is a procedure which is done for clearance of stones from the ureter, kidney or both. There are no incisions involved. The procedure involves your surgeon placing a small scope into your urethra. This is the opening where urine leaves your body.  The surgeon watches as they carefully guide the scope to the stone(s).  Modern flexible ureteroscopes can be used to reach virtually any location within the urinary tract.     The size, shape and location of the stone determines how best to treat the stone(s).  Whenever possible, stones are removed in one piece.  Larger stones need to be broken using a laser before removing in smaller pieces.  The goal is to remove all stones and stone fragments from that side of the body in a single treatment.  Complete stone clearance is an important step to prevent future kidney stone episodes.    Surgery:    Same day outpatient procedure    30-60 minutes    Procedure done in hospital surgical suite    General anesthesia (you will be asleep during the procedure)     Antibiotic prior to surgery to prevent infection    Physician will visit with you and respond to any questions or concerns and consent will be signed prior to going to the operating room    Risks:    Infection - Preoperative antibiotics should prevent new infections but it is possible that unanticipated bacteria may be introduced at time of surgery or that the stones were actually chronically infected before surgery      Injury - The ureter may be injured during this procedure.  This is most likely to happen if the ureter was very inflamed before surgery or if a stone is very tightly impacted.  The surgeon will not aggressively treat a stone if this creates a risk of injury.        Inaccessible Stones -A single procedure is effective in 95% of cases, but if your ureter is very narrow or your kidney stone is very impacted, a stent will be  placed and the procedure stopped.  In 1-2 weeks after the ureter has relaxed, the patient will be brought back to surgery and the procedure can be safely performed.      Incomplete stone clearance -Occasionally stone or stone fragments may not be completely cleared.  These may pass on their own, which may cause discomfort.  Our goal is to remove all possible stones and fragments.    Stent:      An internal soft tube will be placed between the kidney and the bladder while in surgery (after the stone is cleared). The stent will keep the kidney draining.    What should I expect?     It is common for a stent to cause some irritation and discomfort.   You may have:      The need to urinate suddenly     The need to urinate often     Pain during urination     A dull backache, which may get worse during urination     Blood stained urine (like fruit punch) and occasional small clots    It s important to remember the stent is necessary and only temporary. To feel more comfortable:      Drink more than you normally would but you do not have to constantly  flush your kidneys     Limiting your activity may decrease irritation or bleeding    Ibuprofen - 2 tablets every 6-8 hours     Use pain medications as directed.    When is the stent removed?    Most stents are removed within 5 days to 2 weeks after a procedure.     How is the stent removed?     Your stent will be removed in [facility] with a small telescope and a grasping tool.  It usually takes less than 1 minute to remove the stent.    What should I expect after the stent is removed?     You should feel normal by the next day    Some patients find:    An increase in back pain about an hour after the stent is removed as the kidney fills up with urine before it starts to empty.  It can be as uncomfortable as your initial stone episode.  Taking pain medications before stent removal may be helpful, but you would need someone else to drive you to and from your  appointment.    Bladder symptoms usually disappear by the next morning.    Small amounts of blood in the urine may be seen occasionally for up to a week.    Diet:      After surgery, there are no dietary restrictions - Drink to thirst, there is no need to increase intake of fluids, as this may increase nausea symptoms. Try to eat smaller, more frequent snacks, instead of large meals.    Activity:    Many people return to work within 1-2 days. Fatigue is normal for a couple of weeks following surgery. With increased activity you may experience more discomfort and you may notice more blood in your urine.      Post-Operative Symptom Control    While you recover from your procedure, you can take steps to ease your recovery.    Medications that prevent further episodes of severe pain and help stones pass: Take these as prescribed on a regular basis even if you are NOT in pain      Ibuprofen (Advil or Motrin) - Is available over the counter Take 2 (200mg) tablets every 6 hours until the stone passes.  o prevents spasm of the ureter.    o Decreases pain      Dramamine - (drowsy version, non-generic formulation) Is available over the counter and decreases spasm of the ureter.  Take 50mg at bedtime every night until the stone passes. In addition, take every 6 hours as needed.  Dramamine:  o Decreases nausea  o Decreases acute pain  o Decreases recurrence of pain for next 24 hours  o Will help you sleep        *This medication will cause increased drowsiness, do not drive or operate machinery for 6 hours      Flomax- Studies show that Flomax decreases irritation from stents.   o Take every day with food until stone passes even if you do not have pain  o Flomax does not relieve pain.        *This medication may cause nasal congestion or light-headedness      Detrol ( Tolterodine) - After surgery Detrol may decrease stent irritation and pelvic pain  o Take as prescribed     *This medication may cause dry mouth, constipation or  blurry vision. Stop medication if unable to urinate.    Medication that are taken as needed to manage break through symptoms: Take these ONLY as required and hopefully not at all      Narcotics (Percocet, Vicodin, Dilaudid)- take as prescribed for severe pain unrelieved by ibuprofen and dramamine  o Take as prescribed for severe pain  o Narcotics have significant side effects and only  cover-up  pain. They have no effect on cause of pain.  o Common side effects:  - Confusion, disorientation and sedation - DO NOT DRIVE OR OPERATE MACHINERY WITHIN 24 HOURS  - Nausea - take Dramamine or Zofran  or Haldol to help control  - Constipation  - Sleep disturbances      Ondansetron (Zofran)-  o Take as prescribed  o Reserve for severe nausea  o May cause constipation, start over the counter Miralax if needed to treat this    Haldol-  o Take as prescribed  o Reserve for severe nausea    Warning Signs/Symptoms - Please call the [facility] 24 hours a day at [phone] IMMEDIATELY if you experience any of these:    Fever greater than 100.1     Chills    Pain NOT CONTROLLED by pain medications    Heavy bleeding or large clots in urine (small clots can be normal)    Persistent nausea and/or vomiting    Post-Operative Follow up:    The stone(s) will be sent from surgery to a lab for composition analysis.  These results are usually available before a one month post-operative visit.  If you had laser treatment to break up your stone, you will usually be scheduled for a low dose CT scan prior to your one month appointment.  This scan allows your surgeon to confirm that all stone fragments were cleared at time of surgery and that there have been no complications.  These results along with possible labs and urine studies will help us develop an individualized plan to prevent new stones from forming and keep existing stones from enlarging.  This visit is usually scheduled about 1 month after your original surgery.    The  [facility] can respond to your questions or concerns 24 hours a day at [phone].

## 2021-05-29 NOTE — PATIENT INSTRUCTIONS - HE
Patient Stated Goal: Prevent further stones  Cystoscopy with Stent Removal    Cystoscopy is used to help diagnose urinary problems, or to remove a ureteral stent.    During a cystoscopy, your doctor examines the inside of your bladder with an instrument called a cystoscope. A cystoscope is a long, thin flexible tube with a camera at the end.    Your doctor will insert the scope into your urethra allowing him to visualize and evaluate the inside of the bladder for possible abnormalities. The urethra is the tube that carries urine to the outside of your body.    How is the stent removed?    Your stent will be removed in the [facility] with a small telescope and a grasping tool.  It usually takes less than 1 minute to remove the stent.    What should I expect after the stent is removed?     You should feel normal by the next day.    Some patients find:      An increase in back pain about an hour after the stent is removed as the kidney fills up with urine before it starts to empty.  It can be as uncomfortable as your initial stone episode.  Taking pain medications before stent removal may be helpful, but you would need someone else to drive you to and from your appointment.    Bladder symptoms usually disappear by the next morning.    Small amounts of blood in the urine may be seen occasionally for up to a week.    At Home:      It is important to drink plenty of fluids after your procedure    You may continue to use your pain medications as prescribed    What symptoms should I watch for?    Fever     Chills    Increasing back pain that is not relieved with pain medications    Large amounts of blood in the urine or large clots    Leakage of urine (incontinence)     Are not able to urinate for 8 hours    These symptoms may mean you have a blockage or infection. Call the [facility] 24 hours a day at [phone] immediately.

## 2021-05-29 NOTE — ANESTHESIA CARE TRANSFER NOTE
Last vitals:   Vitals:    06/11/19 1040   BP: 110/57   Pulse: 84   Resp: 18   Temp: 37.3  C (99.2  F)   SpO2: 94%     Patient's level of consciousness is drowsy  Spontaneous respirations: yes  Maintains airway independently: yes  Dentition unchanged: yes  Oropharynx: oropharynx clear of all foreign objects    QCDR Measures:  ASA# 20 - Surgical Safety Checklist: WHO surgical safety checklist completed prior to induction    PQRS# 430 - Adult PONV Prevention: 4558F - Pt received => 2 anti-emetic agents (different classes) preop & intraop  ASA# 8 - Peds PONV Prevention: NA - Not pediatric patient, not GA or 2 or more risk factors NOT present  PQRS# 424 - Ya-op Temp Management: 4559F - At least one body temp DOCUMENTED => 35.5C or 95.9F within required timeframe  PQRS# 426 - PACU Transfer Protocol: - Transfer of care checklist used  ASA# 14 - Acute Post-op Pain: ASA14B - Patient did NOT experience pain >= 7 out of 10

## 2021-05-29 NOTE — PROGRESS NOTES
Assessment/Plan:        Diagnoses and all orders for this visit:    Calculus of ureter  -     Verify informed consent; Standing  -     Diet NPO; Standing  -     Place sequential compression device; Standing  -     XR Retrograde Pyelogram W or WO KUB Intraoperative; Standing  -     levoFLOXacin 500 mg/100 mL IVPB 500 mg (LEVAQUIN)  -     ketorolac injection 15 mg (TORADOL)  -     acetaminophen tablet 1,000 mg (TYLENOL)  -     sterile water IR irrigation solution 3,000 mL  -     Patient Stated Goal: Know what to expect after surgery  -     Ureteroscopy Education    Nephrolithiasis  -     Urinalysis Macroscopic    History of kidney stones    Acute cystitis with hematuria    Hydronephrosis with urinary obstruction due to ureteral calculus      Stone Management Plan  [facility] 12/6/2016 6/6/2019   Urinary Tract Infection No suspicion of infection No suspicion of infection   Renal Colic Asymptomatic at this time Well controlled symptoms   Renal Failure No suspicion of renal failure No suspicion of renal failure   Current CT date 12/5/2016 6/5/2019   Right sided stones? No Yes   R Number of ureteral stones - 1   R GSD of ureteral stones - 8   R Location of ureteral stone - Proximal   R Number of kidney stones  - 2   R GSD of kidney stones - 2 - 4   R Hydronephrosis - Mild   R Stone Event No current event New event   Diagnosis date - 6/5/2019   Initial location of primary symptomatic stone - Proximal   Initial GSD of primary symptomatic stone - 8   R Current Plan - Clear   Clear rationale - Poor prognosis   Left sided stones? No Yes   L Number of ureteral stones - No ureteral stones   L Number of kidney stones  - 1   L GSD of kidney stones - 2 - 4   L Hydronephrosis - None   L Stone Event No current event No current event   L Current Plan - Observe   Observe rationale - Limited stone burden with good prognosis for spontaneous passage         Subjective:      HPI  Ms. Tania Kaufman is a 53 y.o.  female  returning to the [facility] [place] for unanticipated visit with acute exacerbation of chronic stone disease.      She is a recurrent unidentified composition stone former who has not required stone clearance procedures. She has not previously participated in stone risk evaluation. She has no identified modifiable stone risk factors. She has identified non-modifiable stone risks including:  limited family history and bilateral stones.    She was seen in consultation by our office in 2016 for nonobstructing nephrolithiasis and periumbilical abdominal pain secondary to diverticulitis/colitis. She was to proceed with initial stone prevention workup but failed to complete.     She was seen through [facility] clinic yesterday by Dr. Arango for complaint of acute onset right flank and abdominal pain. The pain started upon wakening and was constant for ~ 6 hours. It was a dull ache, reaching 4/10 at its worst. She has had similar bouts of pain over past year, previously attributed to biliary colic with hx of gallstones. She has history of recurrent stone disease with ~ 7 episodes over past 14 years, last occurrence 3 years ago. She has had brownish colored urine over past few days. Workup including labs and imaging was notable for CT reporting an obstructing right UPJ stone and bilateral renal stones. Labs demonstrated intact renal function and group b strep + urine culture. She was initiated on amoxicillin by our office at time of scheduling.    Significant current symptoms include:  right flank pain. Pertinent negative current symptoms include:  fever, chills, left flank pain, nausea, vomiting, hematuria, urinary frequency and dysuria.    CT scan from 6/5/19 is personally reviewed and demonstrates a mildly obstructing 8 mm right UPJ stone. Ipsilateral right lower pole renal stones x 2, both < 2 mm. Solitary 3 mm left renal stone.    Significant labs from presentation include severe hematuria, mild pyuria,  negative nitrite, few bacteria,  K Group B streptococcus identified on urine culture, normal WBC, normal creatinine and normal potassium.    PLAN    54 yo F with hx of long standing recurrent stone disease, no previous surgical stone interventions. Newly diagnosed obstructing right UPJ stone with associated right sided pain. Group B strep UTI without fever or dysuria.    Will call lab to obtain sensitivities on urine culture. Continue on current amoxicillin for infection.    Will proceed with ureterscopic stone clearance next week. Risks and benefits were detailed of ureteroscopic stone clearance including potential issues of urinary or systemic infection, ureteral injury, inaccessible stone, incomplete stone clearance, multiple surgeries, and stent related symptoms of urgency, frequency and hematuria Patient verbalized understanding. Patient agrees with plan as discussed. Preoperative evaluation with primary care is not requested as the referring documentation is adequate.    For symptom control, she was prescribed oxycodone and Flomax. Over the counter symptom control medications of ibuprofen, Dramamine and Tylenol were recommended.    Patient also seen and examined by Sigrid Alvarez PA-C       ROS   Review of systems is negative except for HPI.    Past Medical History:   Diagnosis Date     Irritable bowel syndrome      Kidney stone     at age 42       Past Surgical History:   Procedure Laterality Date     NO PAST SURGERIES         Current Outpatient Medications   Medication Sig Dispense Refill     amoxicillin (AMOXIL) 500 MG capsule Take 1 capsule (500 mg total) by mouth 3 (three) times a day for 7 days. 21 capsule 0     GLUCOSAMINE SULFATE (GLUCOSAMINE ORAL) Take by mouth. prn       L.ACID/L.CASEI/B.BIF/B.LUKE/FOS (PROBIOTIC BLEND ORAL) Take by mouth.       melatonin 3 mg Tab tablet Take 3 mg by mouth bedtime as needed.       multivitamin therapeutic (THERAGRAN) tablet Take 1 tablet by mouth daily.       NON  FORMULARY              tamsulosin (FLOMAX) 0.4 mg cap Take 1 capsule (0.4 mg total) by mouth daily. 20 capsule 0     oxyCODONE (ROXICODONE) 5 MG immediate release tablet Take 1 tablet (5 mg total) by mouth 2 (two) times a day as needed for pain. 12 tablet 0     No current facility-administered medications for this visit.        No Known Allergies    Social History     Socioeconomic History     Marital status:      Spouse name: Not on file     Number of children: Not on file     Years of education: Not on file     Highest education level: Not on file   Occupational History     Occupation:     Social Needs     Financial resource strain: Not on file     Food insecurity:     Worry: Not on file     Inability: Not on file     Transportation needs:     Medical: Not on file     Non-medical: Not on file   Tobacco Use     Smoking status: Never Smoker     Smokeless tobacco: Never Used   Substance and Sexual Activity     Alcohol use: Yes     Comment: social only     Drug use: No     Sexual activity: Not on file   Lifestyle     Physical activity:     Days per week: Not on file     Minutes per session: Not on file     Stress: Not on file   Relationships     Social connections:     Talks on phone: Not on file     Gets together: Not on file     Attends [employer] service: Not on file     Active member of club or organization: Not on file     Attends meetings of clubs or organizations: Not on file     Relationship status: Not on file     Intimate partner violence:     Fear of current or ex partner: Not on file     Emotionally abused: Not on file     Physically abused: Not on file     Forced sexual activity: Not on file   Other Topics Concern     Not on file   Social History Narrative     Not on file       Family History   Problem Relation Age of Onset     Cancer Mother         skin     Pacemaker Mother         4 open heart surgeries      Diverticulitis Father      Breast cancer Other 40        great aunt      Urolithiasis Brother         recurrent     Gout Neg Hx      Objective:      Physical Exam  Vitals:    06/06/19 0846   BP: 115/71   Pulse: 85   Temp: 98.1  F (36.7  C)     General - well developed, well nourished, appropriate for age. Appears no distress at this time   Heart - regular rate and rhythm, no murmur  Respiratory - normal effort, clear to auscultation, good air entry without adventitious noises  Abdomen - mildly obese soft, non-tender, no hepatosplenomegaly, no masses.   - no flank tenderness, no suprapubic tenderness, kidney and bladder non-palpable  MSK - normal spinal curvature. no spinal tenderness. normal gait. muscular strength intact.  Neurology - cranial nerves II-XII grossly intact, normal sensation, no unsteadiness  Skin - intact, no bruising, no gouty tophi  Psych - oriented to time, place, and person, normal mood and affect.    Labs   Urinalysis POC (Office):  Blood UA   Date Value Ref Range Status   12/06/2016 Negative Negative Final     Nitrite, UA   Date Value Ref Range Status   06/03/2019 Negative Negative Final   12/06/2016 Negative Negative Final   12/05/2016 Negative Negative Final     Leukocytes, UA    Date Value Ref Range Status   12/06/2016 Trace (!) Negative Final     pH UA   Date Value Ref Range Status   12/06/2016 7.0 4.5 - 8.0 Final       Lab Urinalysis:  Blood, UA   Date Value Ref Range Status   06/03/2019 Large (!) Negative Final   12/05/2016 Negative Negative Final     Nitrite, UA   Date Value Ref Range Status   06/03/2019 Negative Negative Final   12/06/2016 Negative Negative Final   12/05/2016 Negative Negative Final     Leukocytes, UA   Date Value Ref Range Status   06/03/2019 Moderate (!) Negative Final   12/05/2016 Negative Negative Final     pH, UA   Date Value Ref Range Status   06/03/2019 5.5 5.0 - 8.0 Final   12/05/2016 6.5 4.5 - 8.0 Final    and Acute Labs   CBC   WBC   Date Value Ref Range Status   06/03/2019 9.7 4.0 - 11.0 thou/uL Final   12/05/2016 6.5 4.0 - 11.0  thou/uL Final   09/11/2014 4.9 4.0 - 11.0 thou/uL Final   11/26/2013 5.4 4.0 - 11.0 thou/uL Final     Hemoglobin   Date Value Ref Range Status   06/03/2019 13.7 12.0 - 16.0 g/dL Final   12/05/2016 13.3 12.0 - 16.0 g/dL Final   09/11/2014 13.3 12.0 - 16.0 g/dL Final     Platelets   Date Value Ref Range Status   06/03/2019 204 140 - 440 thou/uL Final   12/05/2016 269 140 - 440 thou/uL Final   09/11/2014 197 140 - 440 thou/uL Final   , Renal Panel  [facility]  Creatinine   Date Value Ref Range Status   06/03/2019 0.77 0.60 - 1.10 mg/dL Final   12/05/2016 0.79 0.60 - 1.10 mg/dL Final   09/11/2014 0.72 0.60 - 1.10 mg/dL Final     Potassium   Date Value Ref Range Status   06/03/2019 3.6 3.5 - 5.0 mmol/L Final   12/05/2016 5.0 3.5 - 5.0 mmol/L Final   09/11/2014 4.3 3.5 - 5.0 mmol/L Final     Calcium   Date Value Ref Range Status   06/03/2019 9.6 8.5 - 10.5 mg/dL Final   12/05/2016 9.5 8.5 - 10.5 mg/dL Final   09/11/2014 9.0 8.5 - 10.5 mg/dL Final    and Urine Culture    Culture   Date Value Ref Range Status   06/03/2019 50,000-100,000 col/ml Group B Streptococcus (!)  Final

## 2021-05-29 NOTE — PROGRESS NOTES
Patient presents to the office today for a cystoscopy right ureteral stent removal procedure.    Patient educated regarding stent removal procedure and possible symptoms after removal.  Patient voiced understanding of information.  Handout given to patient.  Consent form signed.    [facility] Timeout    Correct patient?: Yes  Correct site?:  Yes  Correct procedure?:  Yes  Correct laterality?:  Right  Consents verified?:  Yes  Relevant lab results available?:  Yes

## 2021-05-29 NOTE — TELEPHONE ENCOUNTER
RN triage  Patient is calling post kidney stent removal today at 3 pm  Patient states that she has felt increasing pain since removal. She has taken 2 Tylenol and half of a 5mg oxycodone in the past hour. Pain is rated 8-9/10 and felt on the back Right kidney. Denies fever.    Patient is requesting page on call for [facility]. Placed page to [facility] 6:17 pm    Dr. Gutierrez called back. He suggests 1000mg of ibuprofen now along with the other half of the oxycodone and to give it more time.   Patient stated understanding and will do so and call if nothing changes.    Doreen Lassiter, RN  [facility] Triage Nurse  6:25 PM  6/17/2019        Reason for Disposition    [1] SEVERE post-op pain (e.g., excruciating, pain scale 8-10) AND [2] not controlled with pain medications    Protocols used: POST-OP SYMPTOMS AND [id number]

## 2021-05-29 NOTE — ANESTHESIA POSTPROCEDURE EVALUATION
Patient: Tania Kaufman  CYSTOSCOPY, RIGHT RETROGRADE,  RIGHT URETEROSCOPY WITH LASER LITHOTRIPSY AND STENT INSERTION  Anesthesia type: general    Patient location: PACU  Last vitals:   Vitals Value Taken Time   /70 6/11/2019  2:00 PM   Temp 37.1  C (98.8  F) 6/11/2019 11:36 AM   Pulse 61 6/11/2019  2:23 PM   Resp 16 6/11/2019 12:30 PM   SpO2 95 % 6/11/2019  2:23 PM   Vitals shown include unvalidated device data.  Post vital signs: stable  Level of consciousness: awake and responds to simple questions  Post-anesthesia pain: pain controlled  Post-anesthesia nausea and vomiting: no  Pulmonary: unassisted, return to baseline  Cardiovascular: stable and blood pressure at baseline  Hydration: adequate  Anesthetic events: no    QCDR Measures:  ASA# 11 - Ya-op Cardiac Arrest: ASA11B - Patient did NOT experience unanticipated cardiac arrest  ASA# 12 - Ya-op Mortality Rate: ASA12B - Patient did NOT die  ASA# 13 - PACU Re-Intubation Rate: ASA13B - Patient did NOT require a new airway mgmt  ASA# 10 - Composite Anes Safety: ASA10A - No serious adverse event    Additional Notes:

## 2021-05-29 NOTE — PROGRESS NOTES
Patient presents to the office today for evaluation from a Primary Care Referral.  Shannon Campos RN

## 2021-05-29 NOTE — TELEPHONE ENCOUNTER
Test Results  Who is calling?: Patient  Who ordered the test: Lashonda Arango MD  Type of test: Imaging  Date of test:  06/05/2019  Where was the test performed: [facility]  What are your questions/concerns?:  Patient requesting provider review her imaging results, and reach out to her to notify her if she should go to the ER.  Okay to leave a detailed message?:  Yes

## 2021-05-29 NOTE — TELEPHONE ENCOUNTER
Pt has had gallbladder pain and was in UR within 3 months and had gallstones    Pain RUQ history of gall stones  Constant pain now 4/10  Pain for about 5-6 am it started and has been constant  Dull ache  No fever  No vomiting    Pt has hot flashes at times  Recommended ER for abd pain for 2 hours or UR may be an     Jocelyn Haney RN [facility] RN Triage        Reason for Disposition    Constant abdominal pain lasting > 2 hours    Protocols used: ABDOMINAL PAIN - FEMALE-A-OH

## 2021-05-29 NOTE — ANESTHESIA PREPROCEDURE EVALUATION
Anesthesia Evaluation      Patient summary reviewed   History of anesthetic complications     Airway   Mallampati: II  Neck ROM: full   Pulmonary - negative ROS and normal exam    breath sounds clear to auscultation  (-) shortness of breath, sleep apnea, not a smoker                         Cardiovascular - normal exam  Exercise tolerance: > or = 4 METS  (-) angina  Rhythm: regular  Rate: normal,         Neuro/Psych    (+) anxiety/panic attacks,     Endo/Other    (+) obesity,      GI/Hepatic/Renal    (+)   chronic renal disease,     Comments: IBS  gallstones          Dental - normal exam                        Anesthesia Plan  Planned anesthetic: general LMA    ASA 3   Induction: intravenous   Anesthetic plan and risks discussed with: patient  Anesthesia plan special considerations: antiemetics,   Post-op plan: routine recovery

## 2021-05-29 NOTE — PROGRESS NOTES
I was consulted by Lashonda Arango MD to evaluate this patients gall bladder.    HPI: Tania Kaufman is a 53 y.o. female who has been experiencing some problems with right flank and shoulder pain.  Patient had symptoms like this back in July 2018 which were quite severe and she was evaluated with an ultrasound which showed that she had multiple gallstones.  She did not undergo any treatment at that time.  She came back recently to the emergency department with a severe attack of right sided abdominal pain was mostly along her flank she felt the pain through to her back she felt that radiate up into her right shoulder and on her right neck.  This led to an evaluation with an ultrasound with lesions in the gallbladder that they thought were most likely polyps and a CT scan which showed she had a 9 mm stone in the right renal pelvis.  It was notable the patient also had blood in her urine at the time of her presentation with that right flank pain.  In addition to the ultrasound findings the patient was noted to have an elevated lipase at 125.  When I look back on her painful episode from 2018 she had an elevated lipase at 250 at that time.  The patient currently is feeling fairly well but she continues to have some discomfort through that right flank as a low ongoing irritating pain.    Allergies:Patient has no known allergies.    Past Medical History:   Diagnosis Date     Irritable bowel syndrome      Kidney stone     at age 42       Past Surgical History:   Procedure Laterality Date     NO PAST SURGERIES         CURRENT MEDS:    Current Outpatient Medications:      amoxicillin (AMOXIL) 500 MG capsule, Take 1 capsule (500 mg total) by mouth 3 (three) times a day for 7 days., Disp: 21 capsule, Rfl: 0     GLUCOSAMINE SULFATE (GLUCOSAMINE ORAL), Take by mouth. prn, Disp: , Rfl:      L.ACID/L.CASEI/B.BIF/B.LUKE/FOS (PROBIOTIC BLEND ORAL), Take by mouth., Disp: , Rfl:      melatonin 3 mg Tab tablet, Take 3 mg by  "mouth bedtime as needed., Disp: , Rfl:      multivitamin therapeutic (THERAGRAN) tablet, Take 1 tablet by mouth daily., Disp: , Rfl:      NON FORMULARY,    , Disp: , Rfl:      oxyCODONE (ROXICODONE) 5 MG immediate release tablet, Take 1 tablet (5 mg total) by mouth 2 (two) times a day as needed for pain., Disp: 12 tablet, Rfl: 0     tamsulosin (FLOMAX) 0.4 mg cap, Take 1 capsule (0.4 mg total) by mouth daily., Disp: 20 capsule, Rfl: 0    Family History   Problem Relation Age of Onset     Cancer Mother         skin     Pacemaker Mother         4 open heart surgeries      Diverticulitis Father      Breast cancer Other 40        great aunt     Urolithiasis Brother         recurrent     Gout Neg Hx         reports that she has never smoked. She has never used smokeless tobacco. She reports that she drinks alcohol. She reports that she does not use drugs.    Review of Systems:  10 system were reviewed and all are within normal limits except for those listed in the HPI.      EXAM:  /66 (Patient Site: Left Arm, Patient Position: Sitting, Cuff Size: Adult Large)   Pulse 76   Ht 5' 5\" (1.651 m)   Wt 209 lb (94.8 kg)   SpO2 97%   Breastfeeding? No   BMI 34.78 kg/m    GENERAL: Well developed female   EYES: Anicteric Sclera,  EOMI  CARDIAC: RRR w/out murmur  CHEST/LUNG: Clear to ascultation, No wheezes  ABDOMEN: Soft with, +Bowel Sounds  NEURO:No focal deficits, ambulatory  LYMPH: No Axillary or inguinal Adenopathy  EXTREMITIES: Ambulatory, No lower extremity deformities      LABS:  Lab Results   Component Value Date    WBC 9.7 06/03/2019    WBC 4.9 09/11/2014    HGB 13.7 06/03/2019    HCT 40.8 06/03/2019    MCV 88 06/03/2019     06/03/2019     INR/Prothrombin Time  Results from last 7 days   Lab Units 06/03/19  1630   LN-SODIUM mmol/L 139   LN-POTASSIUM mmol/L 3.6   LN-CHLORIDE mmol/L 105   LN-CO2 mmol/L 27   LN-BLOOD UREA NITROGEN mg/dL 14   LN-CREATININE mg/dL 0.77   LN-CALCIUM mg/dL 9.6     Lab Results "   Component Value Date    ALT 23 06/03/2019    AST 17 06/03/2019    ALKPHOS 160 (H) 06/03/2019    BILITOT 0.3 06/03/2019       IMAGES:   I reviewed the US and see the presence of Gall Stones  EXAM: US ABDOMEN LIMITED  LOCATION: [facility]  DATE/TIME: 6/5/2019 8:59 AM     INDICATION: Right flank pain  COMPARISON: CT 12/5/2016. Ultrasound 7/27/2018     FINDINGS:  GALLBLADDER: Several echogenic structures adherent to the wall. The largest measures 6 mm. These are more likely gallbladder polyps versus gallstones. They do not shadow.  BILE DUCTS: No bile duct dilation. Common duct measures 6 mm.  LIVER: Normal where seen.  RIGHT KIDNEY: Mild hydronephrosis. There is an 11 mm stone at the UPJ.  PANCREAS: Visualized portions of the head and body appear normal.     No ascites in the right upper quadrant.     IMPRESSION:   CONCLUSION:  1.  Right hydronephrosis with an 11 mm UPJ stone.  2.  Probable gallbladder polyps. These were not as well visualized on the prior ultrasound with a contracted gallbladder. Since they were present previously no further imaging recommended.  3.  Report called to Dr. Guaman at 9:15 AM    EXAM: CT ABDOMEN PELVIS WO ORAL WO IV CONTRAST  LOCATION: [facility]  DATE/TIME: 6/5/2019 9:43 AM     INDICATION: Hydronephrosis Kidney Stone with obstruction. Right flank pain.  COMPARISON: 12/5/2016 CT  TECHNIQUE: Helical thin-section CT scan of the abdomen and pelvis was performed without oral or IV contrast. Multiplanar reformats were obtained. Dose reduction techniques were used.  CONTRAST: None.     FINDINGS:   LUNG BASES: Negative.     ABDOMEN: Right UPJ 9 x 5 x 5 mm stone with mild hydronephrosis. Right inferior renal 1 and 2 mm stones. Left mid renal 3 mm stone.     Unenhanced liver, gallbladder, adrenals, spleen, and pancreas appear normal.     PELVIS: Left colon diverticulosis. Small bowel appears normal.     MUSCULOSKELETAL: Negative.     IMPRESSION:   CONCLUSION:   1.  Right renal  pelvis 9 mm obstructive stone with mild hydronephrosis.  2.  Bilateral renal nonobstructive stones.    Assessment/Plan: Pt with signs and symptoms consistent with what I would expect with her kidney stones on that right side.  The patient is under evaluation for having the kidney stones treated.  I hope this takes care of her problem however I do have some concerns about her gallbladder as well.  This last ultrasound showed lesions in the gallbladder they thought might be polyps versus stones.  She also has had these bumps in her lipase which make me concerned about the potential for small stones getting out of the gallbladder and hung up in the common bile duct causing her pancreatitis.  She does not have any other obvious or likely sources for pancreatitis in that she does not drink alcohol and she has not started any new medications.  I am not sure about her lipid status.    My recommendation is that she gets the kidney stones taken care of.  If she still having symptoms then I would recommend that her gallbladder is removed.  Given that she has potential stones in the gallbladder and at least 2 documented elevations of her lipase I would recommend that her gallbladder is removed for concerns of choledocholithiasis.        Dallas Guallpa MD  [phone]  [facility] Department of Surgery

## 2021-05-29 NOTE — PROGRESS NOTES
Assessment/Plan:        Diagnoses and all orders for this visit:    Calculus of ureter  -     Urinalysis Macroscopic  -     Culture, Urine- Future; Future; Expected date: 07/17/2019  -     Culture, Urine- Future  -     ciprofloxacin HCl tablet 250 mg (CIPRO)  -     Cystoscopy with Stent Removal Education  -     Patient Stated Goal: Prevent further stones    Other orders  -     ciprofloxacin HCl (CIPRO) 250 MG tablet      Stone Management Plan  [facility] Stone Management 12/6/2016 6/6/2019 6/17/2019   Urinary Tract Infection No suspicion of infection No suspicion of infection No suspicion of infection   Renal Colic Asymptomatic at this time Well controlled symptoms Well controlled symptoms   Renal Failure No suspicion of renal failure No suspicion of renal failure No suspicion of renal failure   Current CT date 12/5/2016 6/5/2019 -   Right sided stones? No Yes -   R Number of ureteral stones - 1 -   R GSD of ureteral stones - 8 -   R Location of ureteral stone - Proximal -   R Number of kidney stones  - 2 -   R GSD of kidney stones - 2 - 4 -   R Hydronephrosis - Mild -   R Stone Event No current event New event Established event   Diagnosis date - 6/5/2019 -   Initial location of primary symptomatic stone - Proximal -   Initial GSD of primary symptomatic stone - 8 -   R Post-op status - - Stent Removal   R Current Plan - Clear -   Clear rationale - Poor prognosis -   Left sided stones? No Yes -   L Number of ureteral stones - No ureteral stones -   L Number of kidney stones  - 1 -   L GSD of kidney stones - 2 - 4 -   L Hydronephrosis - None -   L Stone Event No current event No current event No current event   L Current Plan - Observe -   Observe rationale - Limited stone burden with good prognosis for spontaneous passage -             Subjective:      HPI  Ms. Tania Kaufman is a 53 y.o.  female returning to the [facility] [place] for early postoperative follow up for anticipated stent removal.      She returns status post right ureteroscopic laser lithotripsy for proximal ureteral stone. She has had no unanticipated post-operative events.    She has had no symptoms suspicious for infection and stent was mildly symptomatic with typical issues of flank discomfort and lower urinary tract irritation.     Flexible cystoscopy is performed and indwelling stent is removed without incident.    She will follow up in the office in one month without imaging.         ROS   Review of systems is negative except for HPI.    Past Medical History:   Diagnosis Date     Anxiety      Biliary colic      Diverticulitis      Gallstones      Irritable bowel syndrome      Irritable bowel syndrome with diarrhea      Kidney stone     at age 42     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      PONV (postoperative nausea and vomiting)        Past Surgical History:   Procedure Laterality Date     NO PAST SURGERIES       PA CYSTO/URETERO W/LITHOTRIPSY &INDWELL STENT INSRT Right 6/11/2019    Procedure: CYSTOSCOPY, RIGHT RETROGRADE,  RIGHT URETEROSCOPY WITH LASER LITHOTRIPSY AND STENT INSERTION;  Surgeon: John Gutierrez MD;  Location: [facility];  Service: Urology     wisdom teeth removal         Current Outpatient Medications   Medication Sig Dispense Refill     GLUCOSAMINE SULFATE (GLUCOSAMINE ORAL) Take by mouth. prn       L.ACID/L.CASEI/B.BIF/B.LUKE/FOS (PROBIOTIC BLEND ORAL) Take by mouth.       melatonin 3 mg Tab tablet Take 3 mg by mouth bedtime as needed.       multivitamin therapeutic (THERAGRAN) tablet Take 1 tablet by mouth daily.       NON FORMULARY              oxyCODONE (ROXICODONE) 5 MG immediate release tablet Take 1 tablet (5 mg total) by mouth 2 (two) times a day as needed for pain. 12 tablet 0     tamsulosin (FLOMAX) 0.4 mg cap Take 1 capsule (0.4 mg total) by mouth daily. 20 capsule 0     No current facility-administered medications for this visit.        No Known Allergies    Social  History     Socioeconomic History     Marital status:      Spouse name: Not on file     Number of children: Not on file     Years of education: Not on file     Highest education level: Not on file   Occupational History     Occupation:     Social Needs     Financial resource strain: Not on file     Food insecurity:     Worry: Not on file     Inability: Not on file     Transportation needs:     Medical: Not on file     Non-medical: Not on file   Tobacco Use     Smoking status: Never Smoker     Smokeless tobacco: Never Used   Substance and Sexual Activity     Alcohol use: Yes     Comment: social only     Drug use: No     Sexual activity: Not on file   Lifestyle     Physical activity:     Days per week: Not on file     Minutes per session: Not on file     Stress: Not on file   Relationships     Social connections:     Talks on phone: Not on file     Gets together: Not on file     Attends Jew service: Not on file     Active member of club or organization: Not on file     Attends meetings of clubs or organizations: Not on file     Relationship status: Not on file     Intimate partner violence:     Fear of current or ex partner: Not on file     Emotionally abused: Not on file     Physically abused: Not on file     Forced sexual activity: Not on file   Other Topics Concern     Not on file   Social History Narrative     Not on file       Family History   Problem Relation Age of Onset     Cancer Mother         skin     Pacemaker Mother         4 open heart surgeries      Diverticulitis Father      Breast cancer Other 40        great aunt     Urolithiasis Brother         recurrent     Gout Neg Hx      Objective:      Physical Exam  Vitals:    06/17/19 1501   BP: 130/72   Pulse: 77   Temp: 97.7  F (36.5  C)     General - well developed, well nourished, appropriate for age. Appears no distress at this time  Abdomen - mildly obese soft, non-tender, no hepatosplenomegaly, no masses.   - no flank  tenderness, no suprapubic tenderness, kidney and bladder non-palpable  MSK - normal spinal curvature. no spinal tenderness. normal gait. muscular strength intact.  Psych - oriented to time, place, and person, normal mood and affect.      Labs   Urinalysis POC (Office):  Blood UA   Date Value Ref Range Status   12/06/2016 Negative Negative Final     Nitrite, UA   Date Value Ref Range Status   06/17/2019 Negative Negative Final   06/06/2019 Negative Negative Final   06/03/2019 Negative Negative Final     Leukocytes, UA    Date Value Ref Range Status   12/06/2016 Trace (!) Negative Final     pH UA   Date Value Ref Range Status   12/06/2016 7.0 4.5 - 8.0 Final       Lab Urinalysis:  Blood, UA   Date Value Ref Range Status   06/17/2019 Large (!) Negative Final   06/06/2019 Large (!) Negative Final   06/03/2019 Large (!) Negative Final     Nitrite, UA   Date Value Ref Range Status   06/17/2019 Negative Negative Final   06/06/2019 Negative Negative Final   06/03/2019 Negative Negative Final     Leukocytes, UA   Date Value Ref Range Status   06/17/2019 Small (!) Negative Final   06/06/2019 Moderate (!) Negative Final   06/03/2019 Moderate (!) Negative Final     pH, UA   Date Value Ref Range Status   06/17/2019 7.0 5.0 - 8.0 Final   06/06/2019 6.0 5.0 - 8.0 Final   06/03/2019 5.5 5.0 - 8.0 Final

## 2021-05-30 NOTE — TELEPHONE ENCOUNTER
Message left for patient regarding her emergency room visit to [facility].  Patient is scheduled for her one month post-op visit on 7/11/19.    Shannon Campos RN

## 2021-05-30 NOTE — PATIENT INSTRUCTIONS - HE
Patient Stated Goal: Prevent further stones  Steps for collecting a 24 hour urine specimen    Please follow the directions carefully. All urine voided for a 24-hour period needs to be collected into the jug.  DO NOT change any of your  normal  daily habits when doing this test. Continue to follow your regular diet, intake of fluids, and usual activity level. Pick the most convenient day with your schedule, perhaps on a weekend or a day off.    Start your Diet Log the day before collection and continue on the day of urine collection.  You MUST bring Diet Log with you on follow up visit to discuss results.    One 24hr Urine Collection     Two 24hr Urine Collections  (do not collect on consecutive days)    PLEASE COMPLETE THE 2nd JUG WITHIN 1-2 WEEKS FROM THE 1st JUG    STEP 1  Empty your bladder completely into the toilet. This will be your start time. Write your full legal name, start date and time on the jug label.  Collection start and stop times need to match exactly!  For example:  6 am to 6 am.    STEP 2  The next time you urinate, empty your bladder directly into the jug or collection hat and pour urine into the jug.  Screw the lid back onto the jug.  Do not spill!    STEP 3  Place the jug in the refrigerator or a cooler with ice during the collection period.  Failure to keep it cool could cause inaccurate test results. DO NOT Freeze.    STEP 4  Continue collecting all urine into the jug for the rest of the day, for the full 24 hours.  DO NOT stop early or go over 24 hours!    STEP 5  Exactly 24 hours from start of collection, write your full legal name, stop date and time on the jug label.   Collection start and stop times need to match exactly!  For example:  6 am to 6 am.  Failure to label correctly will result in recollection of urine specimen.    STEP 6  Return each jug within 24 hours after final urination.     STEP 7  Drop off jug locations:   [facility]: Mon-Fri 7am-7pm - Closed on  weekends  [facility]: Mon-Fri 7am-5pm - Closed on Sunday  [facility]: Mon-Fri 7am-6:30pm - Closed on weekends    STEP 8  Please call [facility] after return of your final jug to schedule your follow-up visit. [phone]

## 2021-05-30 NOTE — PROGRESS NOTES
Assessment/Plan:        Diagnoses and all orders for this visit:    Calculus of ureter  -     Urinalysis Macroscopic  -     Stone Formation, 24 Hour Urine x2; Standing  -     Calcium, Ionized, Measured; Future; Expected date: 07/25/2019  -     Parathyroid Hormone Intact with Minerals; Future; Expected date: 07/25/2019  -     Patient Stated Goal: Prevent further stones  -     24 Hour Urine Collection Steps Education      Stone Management Plan  [facility] Stone Management 6/6/2019 6/17/2019 7/11/2019   Urinary Tract Infection No suspicion of infection No suspicion of infection No suspicion of infection   Renal Colic Well controlled symptoms Well controlled symptoms Well controlled symptoms   Renal Failure No suspicion of renal failure No suspicion of renal failure No suspicion of renal failure   Current CT date 6/5/2019 - 7/3/2019   Right sided stones? Yes - Yes   R Number of ureteral stones 1 - No ureteral stones   R GSD of ureteral stones 8 - -   R Location of ureteral stone Proximal - -   R Number of kidney stones  2 - -   R GSD of kidney stones 2 - 4 - -   R Hydronephrosis Mild - None   R Stone Event New event Established event Resolved event   Diagnosis date 6/5/2019 - -   Initial location of primary symptomatic stone Proximal - -   Initial GSD of primary symptomatic stone 8 - -   Resolved date - - 7/3/2019   R Post-op status - Stent Removal <2 mm   R Current Plan Clear - Observe   Clear rationale Poor prognosis - -   Observe rationale - - Limited stone burden with good prognosis for spontaneous passage   Left sided stones? Yes - Yes   L Number of ureteral stones No ureteral stones - 1   L GSD of ureteral stones - - 2   L Location of ureteral stone - - Mid   L Number of kidney stones  1 - No renal stones   L GSD of kidney stones 2 - 4 - N/A   L Hydronephrosis None - Mild   L Stone Event No current event No current event New event   Diagnosis date - - 7/3/2019   Initial location of primary symptomatic stone - - Mid    Initial GSD of primary symptomatic stone - - 2   L MET Status - - Initiation   L Current Plan Observe - MET   MET - - (No Data)   Observe rationale Limited stone burden with good prognosis for spontaneous passage - -             Subjective:      HPI  Ms. Tania Kaufman is a 53 y.o.  female returning to the [facility] [place] for late postoperative follow-up.     She returns status post Right ureteroscopic laser lithotripsy for proximal ureteral stone. She has had to visit the Emergency Department for contralateral new urolithiasis.     She developed acute onset left flank pain, prompting ER visit on 7/3/19. Updated CT scan was obtained demonstrating migration of a previous left renal stone into ureter with associated mild hydronephrosis. Previous right UPJ stone absent. Labs were unremarkable for infection or renal injury and she was sent home with zofran and oxycodone.     Significant current symptoms include:  mild left flank pain. Pertinent negative current symptoms include:  fever, chills, right flank pain, nausea, vomiting, urinary frequency and dysuria.    New CT scan from 7/4/19 was personally reviewed and demonstrates complete clearance of targeted UPJ stone with resolution of previous hydronephrosis. A previous left renal stone has migrated into mid ureter with mild hydronephrosis. Tiny bilateral renal stones otherwise unchanged.    Stone composition was 90 % calcium oxalate and 10 % calcium phosphate (apatite).     PLAN    52 yo F with hx of long standing recurrent stone disease, no previous surgical stone interventions. Interval clearance of right UPJ stone. Recent ER for symptomatic left mid ureteral stone. Tiney bilateral renal stones.     She is at risk for ongoing active stone disease and will initiate stone risk evaluation. One 24 hour urine collection and dietary journal will be obtained after waiting at least two weeks..    Patient also seen and examined by Sigrid Alvarez  FELIZ COATS   Review of systems is negative except for HPI.    Past Medical History:   Diagnosis Date     Anxiety      Biliary colic      Diverticulitis      Gallstones      Irritable bowel syndrome      Irritable bowel syndrome with diarrhea      Kidney stone     at age 42     Pigmented purpuric lichenoid dermatitis of [place]ougerot and [place]      PONV (postoperative nausea and vomiting)        Past Surgical History:   Procedure Laterality Date     NO PAST SURGERIES       AL CYSTO/URETERO W/LITHOTRIPSY &INDWELL STENT INSRT Right 6/11/2019    Procedure: CYSTOSCOPY, RIGHT RETROGRADE,  RIGHT URETEROSCOPY WITH LASER LITHOTRIPSY AND STENT INSERTION;  Surgeon: John Gutierrez MD;  Location: [facility];  Service: Urology     wisdom teeth removal         Current Outpatient Medications   Medication Sig Dispense Refill     dimenhyDRINATE (DRAMAMINE) 50 MG tablet Take 1 tablet (50 mg total) by mouth 4 (four) times a day as needed. 28 tablet 0     GLUCOSAMINE SULFATE (GLUCOSAMINE ORAL) Take by mouth. prn       L.ACID/L.CASEI/B.BIF/B.LUKE/FOS (PROBIOTIC BLEND ORAL) Take by mouth.       melatonin 3 mg Tab tablet Take 3 mg by mouth bedtime as needed.       multivitamin therapeutic (THERAGRAN) tablet Take 1 tablet by mouth daily.       NON FORMULARY              tamsulosin (FLOMAX) 0.4 mg cap Take 1 capsule (0.4 mg total) by mouth daily. 20 capsule 0     ondansetron (ZOFRAN ODT) 4 MG disintegrating tablet Take 1 tablet (4 mg total) by mouth every 8 (eight) hours as needed. 15 tablet 0     oxyCODONE (ROXICODONE) 5 MG immediate release tablet Take 1 tablet (5 mg total) by mouth 2 (two) times a day as needed for pain. 12 tablet 0     No current facility-administered medications for this visit.        No Known Allergies    Social History     Socioeconomic History     Marital status:      Spouse name: Not on file     Number of children: Not on file     Years of education: Not on file     Highest education level: Not on  file   Occupational History     Occupation:     Social Needs     Financial resource strain: Not on file     Food insecurity:     Worry: Not on file     Inability: Not on file     Transportation needs:     Medical: Not on file     Non-medical: Not on file   Tobacco Use     Smoking status: Never Smoker     Smokeless tobacco: Never Used   Substance and Sexual Activity     Alcohol use: Yes     Comment: social only     Drug use: No     Sexual activity: Not on file   Lifestyle     Physical activity:     Days per week: Not on file     Minutes per session: Not on file     Stress: Not on file   Relationships     Social connections:     Talks on phone: Not on file     Gets together: Not on file     Attends Church service: Not on file     Active member of club or organization: Not on file     Attends meetings of clubs or organizations: Not on file     Relationship status: Not on file     Intimate partner violence:     Fear of current or ex partner: Not on file     Emotionally abused: Not on file     Physically abused: Not on file     Forced sexual activity: Not on file   Other Topics Concern     Not on file   Social History Narrative     Not on file       Family History   Problem Relation Age of Onset     Cancer Mother         skin     Pacemaker Mother         4 open heart surgeries      Diverticulitis Father      Breast cancer Other 40        great aunt     Urolithiasis Brother         recurrent     Gout Neg Hx      Objective:      Physical Exam  Vitals:    07/11/19 1459   BP: 133/74   Pulse: 92   Temp: 97.6  F (36.4  C)     General - well developed, well nourished, appropriate for age. Appears no distress at this time  Abdomen - mildly obese soft, non-tender, no hepatosplenomegaly, no masses.   - no flank tenderness, no suprapubic tenderness, kidney and bladder non-palpable  MSK - normal spinal curvature. no spinal tenderness. normal gait. muscular strength intact.  Psych - oriented to time, place, and  person, normal mood and affect.      Labs   Urinalysis POC (Office):  Blood UA   Date Value Ref Range Status   12/06/2016 Negative Negative Final     Nitrite, UA   Date Value Ref Range Status   07/11/2019 Negative Negative Final   07/04/2019 Negative Negative Final   06/17/2019 Negative Negative Final     Leukocytes, UA    Date Value Ref Range Status   12/06/2016 Trace (!) Negative Final     pH UA   Date Value Ref Range Status   12/06/2016 7.0 4.5 - 8.0 Final       Lab Urinalysis:  Blood, UA   Date Value Ref Range Status   07/11/2019 Moderate (!) Negative Final   07/04/2019 Large (!) Negative Final   06/17/2019 Large (!) Negative Final     Nitrite, UA   Date Value Ref Range Status   07/11/2019 Negative Negative Final   07/04/2019 Negative Negative Final   06/17/2019 Negative Negative Final     Leukocytes, UA   Date Value Ref Range Status   07/11/2019 Negative Negative Final   07/04/2019 Negative Negative Final   06/17/2019 Small (!) Negative Final     pH, UA   Date Value Ref Range Status   07/11/2019 5.0 5.0 - 8.0 Final   07/04/2019 6.0 4.5 - 8.0 Final   06/17/2019 7.0 5.0 - 8.0 Final    and Acute Labs   CBC   WBC   Date Value Ref Range Status   07/03/2019 9.9 4.0 - 11.0 thou/uL Final   06/03/2019 9.7 4.0 - 11.0 thou/uL Final   12/05/2016 6.5 4.0 - 11.0 thou/uL Final   09/11/2014 4.9 4.0 - 11.0 thou/uL Final     Hemoglobin   Date Value Ref Range Status   07/03/2019 13.1 12.0 - 16.0 g/dL Final   06/03/2019 13.7 12.0 - 16.0 g/dL Final   12/05/2016 13.3 12.0 - 16.0 g/dL Final     Platelets   Date Value Ref Range Status   07/03/2019 280 140 - 440 thou/uL Final   06/03/2019 204 140 - 440 thou/uL Final   12/05/2016 269 140 - 440 thou/uL Final    and Renal Panel  [facility]  Creatinine   Date Value Ref Range Status   07/03/2019 1.01 0.60 - 1.10 mg/dL Final   06/03/2019 0.77 0.60 - 1.10 mg/dL Final   12/05/2016 0.79 0.60 - 1.10 mg/dL Final     Potassium   Date Value Ref Range Status   07/03/2019 4.1 3.5 - 5.0 mmol/L Final    06/03/2019 3.6 3.5 - 5.0 mmol/L Final   12/05/2016 5.0 3.5 - 5.0 mmol/L Final     Calcium   Date Value Ref Range Status   07/03/2019 9.7 8.5 - 10.5 mg/dL Final   06/03/2019 9.6 8.5 - 10.5 mg/dL Final   12/05/2016 9.5 8.5 - 10.5 mg/dL Final

## 2021-05-31 DIAGNOSIS — Z11.59 ENCOUNTER FOR SCREENING FOR OTHER VIRAL DISEASES: ICD-10-CM

## 2021-06-01 ENCOUNTER — RECORDS - HEALTHEAST (OUTPATIENT)
Dept: ADMINISTRATIVE | Facility: CLINIC | Age: 56
End: 2021-06-01

## 2021-06-02 ENCOUNTER — RECORDS - HEALTHEAST (OUTPATIENT)
Dept: ADMINISTRATIVE | Facility: CLINIC | Age: 56
End: 2021-06-02

## 2021-06-02 NOTE — PROGRESS NOTES
Assessment/Plan:        Diagnoses and all orders for this visit:    Hypercalciuria  -     Parathyroid Hormone Intact; Future  -     Calcium, Ionized, Measured- Future; Future; Expected date: 11/09/2019  -     Magnesium, 24 Hour Urine; Future; Expected date: 10/24/2019  -     Stone Formation, 24 Hour Urine (does not include Magnesium); Future; Expected date: 10/24/2019  -     Patient Stated Goal: Prevent further stones  -     24 Hour Urine Specimen Collections (Sodium Restriction) Education    Low urine output    High urine sodium  -     Magnesium, 24 Hour Urine; Future; Expected date: 10/24/2019    Calculus of kidney  -     Urinalysis Macroscopic      Stone Management Plan  [facility] Stone Management 6/6/2019 6/17/2019 7/11/2019   Urinary Tract Infection No suspicion of infection No suspicion of infection No suspicion of infection   Renal Colic Well controlled symptoms Well controlled symptoms Well controlled symptoms   Renal Failure No suspicion of renal failure No suspicion of renal failure No suspicion of renal failure   Current CT date 6/5/2019 - 7/3/2019   Right sided stones? Yes - Yes   R Number of ureteral stones 1 - No ureteral stones   R GSD of ureteral stones 8 - -   R Location of ureteral stone Proximal - -   R Number of kidney stones  2 - -   R GSD of kidney stones 2 - 4 - -   R Hydronephrosis Mild - None   R Stone Event New event Established event Resolved event   Diagnosis date 6/5/2019 - -   Initial location of primary symptomatic stone Proximal - -   Initial GSD of primary symptomatic stone 8 - -   Resolved date - - 7/3/2019   R Post-op status - Stent Removal <2 mm   R Current Plan Clear - Observe   Clear rationale Poor prognosis - -   Observe rationale - - Limited stone burden with good prognosis for spontaneous passage   Left sided stones? Yes - Yes   L Number of ureteral stones No ureteral stones - 1   L GSD of ureteral stones - - 2   L Location of ureteral stone - - Mid   L Number of kidney stones  1 -  No renal stones   L GSD of kidney stones 2 - 4 - N/A   L Hydronephrosis None - Mild   L Stone Event No current event No current event New event   Diagnosis date - - 7/3/2019   Initial location of primary symptomatic stone - - Mid   Initial GSD of primary symptomatic stone - - 2   L MET Status - - Initiation   L Current Plan Observe - MET   MET - - (No Data)   Observe rationale Limited stone burden with good prognosis for spontaneous passage - -             Subjective:      HPI  Ms. Tania Kaufman is a 53 y.o.  female returning to the [facility] [place] for review of initial stone risk evaluation.     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 She has not previously participated in stone risk evaluation. She has identified modifiable stone risks including:  low urine volume, unclassified hypercalciuria and high urine sodium. She has identified non-modifiable stone risks including:  bilateral stones.     She is asymptomatic at present. She denies symptoms of fever, chills, flank pain, nausea, vomiting, urinary frequency and dysuria.     Two 24 hour urine collections demonstrate moderate low urine volume (1250, 1375 mL), creatinine (1033, 1134 mg), moderate hypercalciuria (326, 342 mg), mild high urine sodium (145, 110 mmol), citrate (527, 726 mg), oxalate (20, 22 mg), and pH (6.5,6). Dietary journal demonstrates: low sodium consumption and low oxalate consumption.    PLAN    52 yo F with hx of long standing recurrent stone disease. Initial stone workup notable for low urine volume and unclassified hypercalciuria. Tiny bilateral renal stones.     Based on review of findings with the patient, she will repeat a single 24 hour collection after a 4 day sodium restriction diet. She initiate increased fluid consumption to generate at least 2,000 ml urine daily and initiate restricted daily sodium intake to less than  1,800 mg/day. Will also obtain PTH and ionized calcium.    Patient also seen and examined by FELIZ Presley   Review of systems is negative except for HPI.    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 prn       L.ACID/L.CASEI/B.BIF/B.LUKE/FOS (PROBIOTIC BLEND ORAL) Take by mouth.       melatonin 3 mg Tab tablet Take 3 mg by mouth bedtime as needed.       multivitamin therapeutic (THERAGRAN) tablet Take 1 tablet by mouth daily.       NON FORMULARY              No current facility-administered medications for this visit.        No Known Allergies    Social History     Socioeconomic History     Marital status:      Spouse name: Not on file     Number of children: Not on file     Years of education: Not on file     Highest education level: Not on file   Occupational History     Occupation:     Social Needs     Financial resource strain: Not on file     Food insecurity:     Worry: Not on file     Inability: Not on file     Transportation needs:     Medical: Not on file     Non-medical: Not on file   Tobacco Use     Smoking status: Never Smoker     Smokeless tobacco: Never Used   Substance and Sexual Activity     Alcohol  use: Yes     Comment: social only     Drug use: No     Sexual activity: Not on file   Lifestyle     Physical activity:     Days per week: Not on file     Minutes per session: Not on file     Stress: Not on file   Relationships     Social connections:     Talks on phone: Not on file     Gets together: Not on file     Attends Jew service: Not on file     Active member of club or organization: Not on file     Attends meetings of clubs or organizations: Not on file     Relationship status: Not on file     Intimate partner violence:     Fear of current or ex partner: Not on file     Emotionally abused: Not on file     Physically abused: Not on file     Forced sexual activity: Not on file   Other Topics Concern     Not on file   Social History Narrative     Not on file       Family History   Problem Relation Age of Onset     Cancer Mother         skin     Pacemaker Mother         4 open heart surgeries      Diverticulitis Father      Breast cancer Other 40        great aunt     Urolithiasis Brother         recurrent     Gout Neg Hx      Objective:      Physical Exam  Vitals:    10/10/19 1514   BP: 118/64   Pulse: 70   Temp: 98  F (36.7  C)     General - well developed, well nourished, appropriate for age. Appears no distress at this time  Abdomen - mildly obese soft, non-tender, no hepatosplenomegaly, no masses.   - no flank tenderness, no suprapubic tenderness, kidney and bladder non-palpable  MSK - normal spinal curvature. no spinal tenderness. normal gait. muscular strength intact.  Psych - oriented to time, place, and person, normal mood and affect.      Labs   Urinalysis POC (Office):  Blood UA   Date Value Ref Range Status   12/06/2016 Negative Negative Final     Nitrite, UA   Date Value Ref Range Status   10/10/2019 Negative Negative Final   07/11/2019 Negative Negative Final   07/04/2019 Negative Negative Final     Leukocytes, UA    Date Value Ref Range Status   12/06/2016 Trace (!) Negative Final     pH  UA   Date Value Ref Range Status   12/06/2016 7.0 4.5 - 8.0 Final       Lab Urinalysis:  Blood, UA   Date Value Ref Range Status   10/10/2019 Trace (!) Negative Final   07/11/2019 Moderate (!) Negative Final   07/04/2019 Large (!) Negative Final     Nitrite, UA   Date Value Ref Range Status   10/10/2019 Negative Negative Final   07/11/2019 Negative Negative Final   07/04/2019 Negative Negative Final     Leukocytes, UA   Date Value Ref Range Status   10/10/2019 Negative Negative Final   07/11/2019 Negative Negative Final   07/04/2019 Negative Negative Final     pH, UA   Date Value Ref Range Status   10/10/2019 6.0 5.0 - 8.0 Final   07/11/2019 5.0 5.0 - 8.0 Final   07/04/2019 6.0 4.5 - 8.0 Final    and Stone prevention labs   24 hour urine   Calcium, 24H Urine   Date Value Ref Range Status   09/23/2019 326 (H) 20 - 275 mg/24hr Final     Comment:     Hypercalciuria >350  Values are for persons with  average daily calcium intake  (600-800 mg/day)   08/18/2019 342 (H) 20 - 275 mg/24hr Final     Comment:       Hypercalciuria >350  Values are for persons with  average daily calcium intake  (600-800 mg/day)     Sodium, 24 Hour Urine   Date Value Ref Range Status   09/23/2019 145 40 - 217 mmol/24hr Final   08/18/2019 110 40 - 217 mmol/24hr Final     Citrate, 24 Hour Urine   Date Value Ref Range Status   09/23/2019 527 370-1,191 mg/24hr Final     Comment:     Reference Ranges are not  established for 0-19 years  or >60 years of age.   08/18/2019 726 370-1,191 mg/24hr Final     Comment:       Reference Ranges are not  established for 0-19 years  or >60 years of age.     Oxalate, 24 Hour Urine   Date Value Ref Range Status   09/23/2019 19.6 7.0 - 44.0 mg/24hr Final   08/18/2019 21.9 7.0 - 44.0 mg/24hr Final     pH, Urine   Date Value Ref Range Status   09/23/2019 6.5 4.5 - 8.0 Final   08/18/2019 6.0 4.5 - 8.0 Final     Urine Volume   Date Value Ref Range Status   09/23/2019 1,250 mL Final   08/18/2019 1,375 mL Final

## 2021-06-02 NOTE — PATIENT INSTRUCTIONS - HE
Patient Stated Goal: Prevent further stones  Steps for collecting a 24 hour urine specimen    Please follow the directions carefully. All urine voided for a 24-hour period needs to be collected into the jug.   Pick the most convenient day with your schedule, perhaps on a weekend or a day off.    Restrict dietary intake of sodium to 1800mg per day or less for 4 consecutive days.    Start the 24 hour urine collection on day 4 of the sodium restrictive diet.    Start your Diet Log the day before collection and continue on the day of urine collection.  You MUST bring Diet Log with you on follow up visit to discuss results.    One 24hr urine collection while on dietary sodium restriction challenge    STEP 1  Empty your bladder completely into the toilet. This will be your start time. Write your full legal name, start date and time on the jug label.  Collection start and stop times need to match exactly!  For example:  6 am to 6 am.    STEP 2  The next time you urinate, empty your bladder directly into the jug or collection hat and pour urine into the jug.  Screw the lid back onto the jug.  Do not spill!    STEP 3  Place the jug in the refrigerator or a cooler with ice during the collection period.  Failure to keep it cool could cause inaccurate test results. DO NOT Freeze.     STEP 4  Continue collecting all urine into the jug for the rest of the day, for the full 24 hours.  DO NOT stop early or go over 24 hours!    STEP 5  Exactly 24 hours from start of collection, write your full legal name, stop date and time on the jug label.   Collection start and stop times need to match exactly!  For example:  6 am to 6 am.  Failure to label correctly will result in recollection of urine specimen.    STEP 6  Return each jug within 24 hours after final urination.    STEP 7  Drop off jug locations:   [facility]: Mon-Fri 7am-7pm - Closed on weekends  [facility]: Mon-Fri 7am-5pm - Closed on Sunday  [facility]: Mon-Fri  7am-6:30pm - Closed on weekends    STEP 8  Please call [facility] after return of your final jug to schedule your follow-up visit. [phone]

## 2021-06-03 VITALS — WEIGHT: 208 LBS | BODY MASS INDEX: 34.66 KG/M2 | HEIGHT: 65 IN

## 2021-06-03 VITALS — HEIGHT: 65 IN | WEIGHT: 209 LBS | BODY MASS INDEX: 34.82 KG/M2

## 2021-06-03 VITALS — WEIGHT: 208.1 LBS | BODY MASS INDEX: 34.63 KG/M2

## 2021-06-04 VITALS
SYSTOLIC BLOOD PRESSURE: 119 MMHG | TEMPERATURE: 97.6 F | HEART RATE: 69 BPM | OXYGEN SATURATION: 98 % | RESPIRATION RATE: 16 BRPM | WEIGHT: 213 LBS | BODY MASS INDEX: 35.45 KG/M2 | DIASTOLIC BLOOD PRESSURE: 81 MMHG

## 2021-06-04 VITALS
DIASTOLIC BLOOD PRESSURE: 70 MMHG | SYSTOLIC BLOOD PRESSURE: 112 MMHG | HEART RATE: 86 BPM | BODY MASS INDEX: 35.82 KG/M2 | WEIGHT: 215 LBS | RESPIRATION RATE: 16 BRPM | HEIGHT: 65 IN

## 2021-06-04 VITALS — BODY MASS INDEX: 35.82 KG/M2 | HEIGHT: 65 IN | WEIGHT: 215 LBS

## 2021-06-04 VITALS
RESPIRATION RATE: 18 BRPM | DIASTOLIC BLOOD PRESSURE: 66 MMHG | SYSTOLIC BLOOD PRESSURE: 104 MMHG | WEIGHT: 212 LBS | BODY MASS INDEX: 35.28 KG/M2 | HEART RATE: 68 BPM

## 2021-06-04 NOTE — TELEPHONE ENCOUNTER
Orders being requested: New order for Cologuard Kit  Reason service is needed/diagnosis: Previous kit is damaged.  Doctor needs to order a new one for her.  When are orders needed by: When provider can do  Where to send Orders: [employer]  Okay to leave detailed message?  Yes

## 2021-06-04 NOTE — TELEPHONE ENCOUNTER
Reached out to patient and relayed the below message. Patient stated once the results are available, she will schedule an office visit.  Teresa Delgado

## 2021-06-05 VITALS
HEART RATE: 68 BPM | DIASTOLIC BLOOD PRESSURE: 60 MMHG | BODY MASS INDEX: 36.12 KG/M2 | HEIGHT: 65 IN | RESPIRATION RATE: 16 BRPM | SYSTOLIC BLOOD PRESSURE: 100 MMHG | WEIGHT: 216.8 LBS

## 2021-06-06 NOTE — TELEPHONE ENCOUNTER
"----- Message from Yessica Koenig sent at 2/25/2020 10:58 AM CST -----  Regarding: GUILLERMO - MEDICATION QUESTION  ..General phone call:    Caller: Eden  Primary cardiologist: Dr. Perkins  Detailed reason for call: Pt said her heart has been \"going crazy\" for the past 3 hours, and is looking for medication recommendations.  Echo results?  New or active symptoms? Heart going crazy  Best phone number: [phone]  Best time to contact: Any  Ok to leave a detailed message? Yes  Device? No    Additional Info:        "

## 2021-06-06 NOTE — TELEPHONE ENCOUNTER
"Pt concerned about irregular heart beats, started last July   Heart would be quickly and irregularly   Beating very quickly and it feels like she can't catch her breath   Sometimes 2 minutes and sometimes 3 hours and can't fall asleep when this happens  3 times in the last week   Not currently experiencing this feeling today/now.   Some dizziness and shortness of breath when episodes happen  \"guzman stressing and uncomfortable\"   Hx of A-fib runs in family       Reason for Disposition    [1] Heart beating very rapidly (e.g., > 140 / minute) AND [2] not present now  (Exception: during exercise)    Protocols used: HEART RATE AND HEARTBEAT [id number]    Care Disposition: See Physician with 4 hours. Pt verbalizes understanding. Pt will go to [facility] now.     Su Jimenez RN Triage Nurse Advisor 5:35 PM      "

## 2021-06-06 NOTE — PATIENT INSTRUCTIONS - HE
Thank you for allowing the [facility] to be a part of your care. Please pay close attention to the following medications as they have been changed during this visit.     1.) Please start taking apixaban (ELIQUIS)  5 mg two times a day    2.) Please start taking metoprolol succinate 12.5 mg (half of a 25 mg tablet) one time daily.

## 2021-06-06 NOTE — TELEPHONE ENCOUNTER
Received a [employer] OT faxed report to my desk yesterday while I was out of the clinic.  This indicates a new finding of new onset atrial flutter occurring at 2/17/2020 at 1948 with a heart rate of 111.  The report indicated she was not symptomatic.  I called the patient and she reports however 2 nights ago consistent with that date and timeframe she experienced the irregularity in her heartbeat with faster/racing heartbeat and shortness of breath intermittently, resolve spontaneously.  She states these have been going on for the past 6 months. Pt has not been drinking caffeine, is somewhat stressed however.  This is what she was seen at walk-in clinic for on 2/10 but EKG at that time was normal.  It appears there were no labs drawn at the time so I have recommended we check some basic labs to screen for anemia, thyroid disease, etc.  She is going to follow-up with the cardiology clinic on Friday to further evaluate her Holter monitor which she is currently wearing for a 7-day timeframe.  Patient understands that I have not evaluated her for these symptoms (which would have been my preference)  though we are working with previous work-up done by the walk-in clinic which is why we ordered the Holter monitor in the first place.     She reports her mom with hx of multiple heart issues and afib.

## 2021-06-06 NOTE — TELEPHONE ENCOUNTER
Eden was contacted today to discuss her current symptoms of palpitations related to her recent Afib diagnosis. She noted the onset of this again today around 8 am and it has been constant ever since. She was prescribed Metoprolol Succinate 12.5mg daily( as half tab)  and Eliquis 5mg two times a day.Beginning last Friday 2/21/2020.    She was worried about the fact that her palpitations were ongoing since this morning so she took the second half of the Metoprolol Succinate (12.5mg additional)  Around noon today, on her own decision.   While she has noted that her heart rate is somewhat improved, the palpitations are still there. She estimates that her HR is around 100 bpm presently.     She asked about the results of her echocardiogram, and these were shared with her today. It was stressed to her that overall her heart's structure and function was good, that the pumping function may in fact be somewhat lower due to the current rhythm she is experiencing, but still in the normal range.     She had not scheduled with Afib Clinic yet and was hesitant to do so, was asking what the benefits of this would be. She was advised that the Afib clinic is supported by CNP's that will assist in the medication management of her Afib, and also offer additional elective procedures that may offer alternative ways to manage the Afib more permanently in the future such as Ablation. She then decided to schedule, and is arranged to see Florecita Cox CNP on 3/2/2020.     I offered Eden information that will assist her in identification of her need to pursue additional care more urgently. Such as if she became lightheaded, dizzy, feeling faint, extreme fatigue or extreme shortness of breath. She was advised that this is not deemed an unsafe rhythm, and that she is well protected with the use of Eliquis against clot formation and stroke. That the palpitations alone are not a concerning symptom to seek additional medical attention. She  verbalized understanding. Sk/RN    Dr. Perkins, any additional recommendations or orders at this time? sk/RN

## 2021-06-06 NOTE — TELEPHONE ENCOUNTER
Reached out to patient and left a message to return phone call. Please inform patient Dr. Arango is out of clinic this week, but will be returning on Monday 02/17/2020. I will forward this message to her to address when she returns to clinic. Thank you, Teresa Delgado

## 2021-06-12 NOTE — PATIENT INSTRUCTIONS - HE
Taina Kaufman,    It was a pleasure to see you today at the [facility].     My recommendations after this visit include:    Stop taking metoprolol.  Start diltiazem 120 mg daily.    Call if A fib increases in frequency or duration    Follow up in 6 months, or sooner as needed    Florecita Cox, CNP  [facility], Electrophysiology  [phone]  EP nurses [phone]

## 2021-06-14 NOTE — PATIENT INSTRUCTIONS - HE
Tania Kaufman,    It was a pleasure to see you today at the [facility].     My recommendations after this visit include:    Increase flecainide to 100 mg two times a day.  New new prescription sent to your pharmacy for 100 mg tablets.    Consider pulmonary vein isolation ablation to treat A. fib    Follow-up in 1 month.    Flroecita Cox, CNP  [facility], Electrophysiology  [phone]  EP nurses [phone]

## 2021-06-15 NOTE — PATIENT INSTRUCTIONS - HE
Tania Kaufman,    It was a pleasure to see you today at the [facility].     My recommendations after this visit include:    Continue current medications.    Take an extra flecainide 50 mg at onset of a fib    Plan for ablation to treat A fib    Anticipate next follow up will be in preparation for ablation    Florecita Cox CNP  [facility], Electrophysiology  [phone]  EP nurses [phone]

## 2021-06-15 NOTE — PROGRESS NOTES
ROS: feels like not in a. Fib. Has had headache and dizziness.    Patient is in waiting room of video link.

## 2021-06-15 NOTE — TELEPHONE ENCOUNTER
----- Message from Florecita Cox CNP sent at 2/19/2021  2:45 PM CST -----  Eden would like to proceed with PVI.  Good for PVI clinic.  Will need CT/MRI.  Start Eliquis 1 month prior to PVI.Thanks,Florecita

## 2021-06-15 NOTE — TELEPHONE ENCOUNTER
Dr. Alberts- please review pts chart for PVI clinic after being referred by Florecita Cox NP.      Pt has hx of paroxysmal AFIB, currently is on flecainide 100mg two times a day and diltiazem 120mg daily.     Recent Echo 2/24/20 shows EF 58%  Recent stress test 12/21/20- negative for ischemia or infarction. EF 70%    Pt not on AC- would start 4 weeks prior to PVI.  No PARRISH unless missed dose of AC  +pulm vein CT/MRI  +start omeprazole 40mg daily 4 weeks prior/4 weeks post.    Are you ok with scheduling pt in PVI clinic?    Thank you,  Gifty

## 2021-06-15 NOTE — PROGRESS NOTES
"H&P  Teach  PARRISH or No PARRISH No CT/MRI/None MRI PVI Clinic?Y/N Y   I Order  P Order  PARRISH Order n/a Lab/EKG Order  CT/MRI Order X   Letter Sent   EP INR Msg  n/a Antic INR Msg   n/a RN F/U  NP F/U    COVID   AAD flecainide     PPI: (start, increase, continue) Omeprozole 40mg QD start Pepcid 20mg BID  Protonix 40mg QD      Anticoag: (start, switch, continue) Eliquis start Xarelto  Pradaxa  Warfarin      1965  Home:[phone] (home) Cell:[phone] (mobile)  Emergency Contact: Dong Edwards [phone]    Important patient information for CSC/Cath Lab staff : None    [facility] EP Cath Lab Procedure Order   Ablation Type:  \"PVI- Atrial Fibrillation  Specific location of pathway: Left  Diagnosis:  AF    Scheduling Information:  Anticipated Case Duration:  PVI- 5 hr, ok to schedule 2 ablations including PVIs on scheduled date   Scheduling Timeframe:  COVID Scheduling within 30 days (Tier 2)  Clinic Arrangements prior to PVI: Schedule pt into PVI clinic day and for PVI procedure  Scheduling Restrictions: Will need to start anticoagulant 4 wks prior- schedule accordingly  Scheduling Contact: Please contact pt to schedule, if you are unable to schedule date within the next 24 hours please contact pt to update on scheduling process  EP RN Follow Up Apt: CV  to schedule EP RN follow up apt in the DT clinic, 3-4 days after ablation for suture removal/assessment    MD Preference: PVI Clinic- Ok to schedule with Dr Reina or Dr Lexus CAMARENA Assist:  No Specific MD:  N/A    Current Device: None None  Device Company/Device Rep Needed for Procedure: None    Pre-Procedural Testing needed: COVID 19 nasal/lab test, Pulmonary Vein CT/MRI, BMP, CBC with Plt, and Type and Screen AM of Case and Beta Hcg AM of case  Mapping System Required:  BERTIN for Dr Reina (OR 24 only at [facility]) or Carto for Dr Alberts (Rm 1 [facility], or [facility])- please arrange appropriately with performing MD  ICE Needed:  Yes  Special equipment/Staff needed for case: " None  Anesthesia:  General-Whole Case  Research Protocol:  No    [facility] EP Cath Lab Prep   Ordering Provider: Dr Alberts  Ordering Date: 3/1/2021  Orders Status: Intial order placed and Order set placed    H&P:  PVI Clinic-H&P, MD consult, EP RN education, and Labs to be scheduled during PVI clinic day  PCP: Lashonda Arango MD, [phone]    Pre-op Labs: Done within 7 days    Medical Records Pertinent for Procedure:  Stress Test 12/21/20 negative for ischemia or infarct, EF 70% and Echo 2/24/20 EF 58%  Important Past Medical Hx/Diagnosis: CHADS VASC 1   Most Recent Lab Results: BMP- Within Normal Limits Heme- Within Normal Limits    Patient Education:  Teach with Patient: Teach with pt will be completed same day as pre-op H&P-see additional clinic note    Risks Reviewed:     Pulmonary Vein/AF/Radiofrequency Ablation  In addition to standard risks for Radiofrequency Ablation, there is:    <2% for significant pulmonary vein stenosis    <2% risk for embolic events    <1% risk for esophageal fistula    <1% risk death    Pulmonary Vein Isolation / Cryoablation Risks:    1-2% risk for phrenic nerve paralysis    <1% risk for pulmonary vein stenosis    Risk of esophageal irritation with no incidence of atrial-esophageal fistula    Rare cryoballoon rupture    <1% risk death      Pre-Procedure Instructions that were Reviewed with Patient:  NPO after midnight, Remove all jewelry prior to coming in for procedure, Shower prior to arrival, Notified patient of time and date of procedure by CV , Transportation arrangements needed s/p procedure, Post-procedure follow up process, Sedation plan/orders and Pre-procedure letter was sent to pt by CV     Pre-Procedure Medication Instructions:  Instructions given to pt regarding anticoagulants: Pt will be started on anticoagulant 1mo prior to PVI- instructed to continue anticoagulation uninterrupted through their procedure  Instructions given to pt regarding  antiarrhythmic medication: Flecainide; Pt instructed to hold 3 days prior to procedure  Instructions given to pt regarding PPI medication:to start Omeprazole 40mg Daily 4 weeks prior to PVI, and to continue for 4 week s/p PVI  Instructions for medication, other than anticoagulants and antiarrhythmics listed above, given to pt: to hold all morning medications   Allergies: Reviewed allergies, no concerns regarding orders for procedure  No Known Allergies    Current Outpatient Medications:      aspirin 81 MG EC tablet, Take 1 tablet (81 mg total) by mouth daily., Disp: , Rfl: 0     co-enzyme Q-10 30 mg capsule, Take 30 mg by mouth daily., Disp: , Rfl:      diltiazem (CARDIZEM CD) 120 MG 24 hr capsule, Take 1 capsule (120 mg total) by mouth daily., Disp: 90 capsule, Rfl: 3     flecainide (TAMBOCOR) 100 MG tablet, Take 1 tablet (100 mg total) by mouth 2 (two) times a day., Disp: 180 tablet, Rfl: 3     GLUCOSAMINE SULFATE (GLUCOSAMINE ORAL), Take by mouth. prn, Disp: , Rfl:      L.ACID/L.CASEI/B.BIF/B.LUKE/FOS (PROBIOTIC BLEND ORAL), Take by mouth., Disp: , Rfl:      melatonin 3 mg Tab tablet, Take 3 mg by mouth bedtime as needed., Disp: , Rfl:      multivitamin therapeutic (THERAGRAN) tablet, Take 1 tablet by mouth daily., Disp: , Rfl:      NON FORMULARY,    , Disp: , Rfl:     Documentation Date:3/1/2021 10:32 AM  Gifty Guerra RN

## 2021-06-17 NOTE — TELEPHONE ENCOUNTER
Telephone Encounter by Gifty Guerra RN at 3/1/2021 10:15 AM     Author: Gifty Guerra RN Service: -- Author Type: Registered Nurse    Filed: 3/1/2021 10:32 AM Encounter Date: 2/19/2021 Status: Signed    : Gifty Guerra RN (Registered Nurse)       Wiliam Alberts MD Holen, Megan L, RN   Caller: Unspecified (1 week ago)             Eden is a good candidate for catheter ablation.  Agree with your procedural scheduling plan and agree to see patient in AF clinic.    Cardiac MRI is indicated in the absence of any prior cardiac/chest imaging.   Please let me know if you have any additional questions.  Thank you

## 2021-06-17 NOTE — PATIENT INSTRUCTIONS - HE
Patient Instructions by Lashonda Arango MD at 6/3/2019  3:40 PM     Author: Lashonda Arango MD Service: -- Author Type: Physician    Filed: 6/3/2019  4:21 PM Encounter Date: 6/3/2019 Status: Addendum    : Lashonda Arango MD (Physician)    Related Notes: Original Note by Lashonda Arango MD (Physician) filed at 6/3/2019  4:00 PM       Consider looking into a low FODMAP diet approach to identify triggers for your symptoms and eliminate these from your diet in a structured, specific way.     [email or web address]://www.[employer]/      Patient Education     Possible Gallstone with Biliary Colic (Presumed)    Your abdominal pain is may be due to spasm of the gallbladder. This is called gallbladder or biliary colic. The gallbladder is a small sac under the liver, which stores and releases bile. Bile is a fluid that aids in the digestion of fat. Eating fatty food stimulates the gallbladder to contract, and release the bile. A gallstone may form in this sac. Although most people do not have symptoms, when the stone moves and blocks the passage of bile out of the bladder, it can cause pain and even an infection.  To be more certain of the diagnosis, you may need to have an ultrasound, CT-scan or other special test.  A number of things increase the risk for developing gallstones:    Women are more likely    Obesity    Increasing age    Losing or gaining weight quickly    High calorie diet    Pregnancy    Hormone therapy    Diabetes  The most common symptoms are:    Abdominal pain, cramping, aching    Nausea, vomiting    Fever  Many illnesses can cause these symptoms. This pain usually starts in the upper right side of your abdomen. Sometimes it can radiate to your right shoulder, back and arm. It usually starts suddenly, becomes more intense quickly, and then gradually decreases and disappears over a couple of hours. Elderly people and diabetics may have difficulty showing where the pain is exactly.  Home  care    Rest in bed and follow a clear liquid diet until feeling better. If pain or nausea medicine was given to help with your symptoms, take these as directed.    You can take acetaminophen or ibuprofen for pain, unless you were given a different pain medicine to use.Note: If you have chronic liver or kidney disease or ever had a stomach ulcer or GI bleeding or are taking blood thinner medications, talk with your doctor before using these medicines.    Fat in your diet makes the gallbladder contract and may cause increased pain. Therefore, avoid fat in your diet over the next two days and follow a low-fat diet after that. If you are overweight, a low fat diet will help you lose weight.  Follow-up care  If a test was already scheduled for you, keep this appointment. Be sure you know how to prepare yourself for the test. Usually, you will be asked not to eat or drink anything for at least 8 hours before the test. Schedule an appointment with your own doctor after your test is complete to discuss the findings.  When to seek medical advice  Call your healthcare provider if any of the following occur:    Pain gets worse or moves to the right lower abdomen    Repeated vomiting    Swelling of the abdomen    Pain lasts over 6 hours    Fever of 100.4  F (38  C) or higher, or as directed by your healthcare provider    Weakness, dizziness    Dark urine or light colored stools    Yellow color of the skin or eyes    Chest, arm, back, neck or jaw pain  Call 911  Call 911 if any of these occur:    Trouble breathing    Very confused    Very drowsy or trouble awakening    Fainting or loss of consciousness    Rapid heart rate  Date Last Reviewed: 8/23/2015 2000-2017 [employer]. [address] [place] [address]. All rights reserved. This information is not intended as a substitute for professional medical care. Always follow your healthcare professional's instructions.           Patient Education      Treating Gallstones    The gallbladder is an organ that stores bile. This is a substance that helps with digestion. Deposits in bile can clump together, creating hard, pebble-like stones. These gallstones may not cause any symptoms. In most cases, gallstones have no symptoms. In some cases, though, they irritate the walls of the gallbladder. Or they can block flow of bile out of the gallbladder. If they fall into the common bile duct, stones can block the flow of bile into the small bowel. This can lead to jaundice, pain, or serious infection. Stones are treated only if you have symptoms. If treatment is needed, your healthcare provider will discuss your choices with you. The most common treatments are listed below.  Medicine  Medicines to dissolve gallstones don't really work.   ERCP  ERCP (endoscopic retrograde cholangiopancreatography) is an outpatient procedure that needs heavy sedation to remove stones. It uses a thin tube with video and X-rays to locate stones blocking the flow of bile. The stones are then removed. ERCP may be done alone. Or it may be used before surgery is done to remove the gallbladder.  Surgery  A cholecystectomy is an operation to remove the gallbladder and its contents (gallstones). Today, most cholecystectomies are done laparoscopically. This means using several very small abdominal incisions. Cholecystectomy may also be done with the traditional single, larger incision.   Prevent future symptoms  After treatment, you should follow a low-fat diet. This diet includes lean meats, lean poultry, and fish. Avoid full-fat dairy products. And limit vegetable oils. Read food labels to be sure theyre low in fat.   Date Last Reviewed: 5/1/2016 2000-2017 [employer]. [address] [place] [address], [place] [address]. All rights reserved. This information is not intended as a substitute for professional medical care. Always follow your healthcare professional's instructions.

## 2021-06-17 NOTE — TELEPHONE ENCOUNTER
Phone call to patient to discuss initiation of Eliquis 4 weeks prior to her PVI scheduled with Dr. Reina on 7-1-21.  Pt has taken this medication before and may still have some.  New rx sent to her pharmacy.  Instructional letter also sent to patient via my chart and mail with coupon card for Eliquis.   Answered all questions and reviewed contact information.

## 2021-06-18 NOTE — PATIENT INSTRUCTIONS - HE
Patient Instructions by Ally Booth CNP at 2/10/2020  6:10 PM     Author: Ally Booth CNP Service: -- Author Type: Nurse Practitioner    Filed: 2/10/2020  7:24 PM Encounter Date: 2/10/2020 Status: Addendum    : Ally Booth CNP (Nurse Practitioner)    Related Notes: Original Note by Ally Booth CNP (Nurse Practitioner) filed at 2/10/2020  7:23 PM       EKG looks good.      Recommend sending a [employer] message to your primary care provider about symptoms and perhaps they can set up a Holter Monitor without another visit.      Avoid caffeine, alcohol as much as possible.    Do not be afraid of the ER with any sort of fast heart rate with dizziness, shortness of breath, especially if longer than 30 minutes-1 hour.  You should be seen for any heart rates persistently over 130-140.        Patient Education     Understanding Heart Palpitations    Heart palpitations are the feeling you have when your heartbeat seems to be racing, pounding, skipping, or fluttering. Heart palpitations are most often felt in the chest. Sometimes, they may also be felt in the neck.   What causes heart palpitations?  In most cases, heart palpitations are caused by:    Stress or anxiety    Exercise    Pregnancy    Some medicines    Caffeine    Nicotine    Alcohol    Illegal drugs, such as cocaine    Health problems, such as anemia or overactive thyroid  Many heart palpitations are harmless. But in some cases, palpitations may be caused by a problem with the heart such as an abnormal heart rhythm (arrhythmia). They may need to be managed by you and your healthcare provider or treated right away.  How are heart palpitations treated?  Treatments for heart palpitations depend on the cause. Options may include:    Managing the things that trigger your heart palpitations. This could mean:  ? Learning ways to reduce stress and anxiety  ? Staying away from caffeine, nicotine, alcohol, and illegal drugs  ? Stopping the use of  certain medicines, under your doctors guidance    Medicines, procedures, or surgery to treat an arrhythmia or other health problem that is causing your symptoms  What are possible complications of heart palpitations?  Complications of heart palpitations are rare unless they are caused by a problem such as an arrhythmia. In such cases, complications can include:    Fainting    Heart failure. This problem occurs when the heart is so weak it no longer pumps blood well.    Blood clots and stroke    Sudden cardiac arrest. This problem occurs when the heart suddenly stops beating.  When should I call my healthcare provider?  Call your healthcare provider right away if you have any of these:    Palpitations that prevent you from sleeping or otherwise affect your quality of life.    Symptoms that dont get better with treatment, or symptoms that get worse    New symptoms, such as chest pain, shortness of breath, dizziness, or fainting  Date Last Reviewed: 5/1/2016 2000-2019 [employer]. [address] [place] [address]. All rights reserved. This information is not intended as a substitute for professional medical care. Always follow your healthcare professional's instructions.

## 2021-06-20 ENCOUNTER — HEALTH MAINTENANCE LETTER (OUTPATIENT)
Age: 56
End: 2021-06-20

## 2021-06-21 NOTE — LETTER
Letter by Nate Reina MD at      Author: Nate Reina MD Service: -- Author Type: --    Filed:  Encounter Date: 5/17/2021 Status: (Other)         Tania Kaufman  [id number] [facility] [address]      May 17, 2021      Dear MsRoseanne Kaufman,    Thank you for choosing [facility]. A summary of your upcoming appointments are listed below.     DATE CHECK-IN TIME APPOINTMENT   Tuesday 6/22/21 2:30 pm Pre-op history & physical with Dr. Reina, Estrella Albarran NP and procedure education with RN  (check-in at [facility] office)   Monday 6/28/21  4:45 pm  Pre-Procedural COVID Testing  ([address])   Thursday 7/1/21 6:00 am Ablation with Dr. Reina  (check-in at [facility])   Monday 7/5/21  1:15 pm Post-ablation follow up with RN  (check-in at [facility] office)     Should you need to change any of these appointments, or if you have any questions, please feel free to call me at [phone].       Sincerely,  [facility] - Procedure   Electronically signed by Nate Reina MD

## 2021-06-21 NOTE — LETTER
Letter by Nancy Arreola RN at      Author: Nancy Arreola RN Service: -- Author Type: --    Filed:  Encounter Date: 5/18/2021 Status: (Other)         Tania Kaufman  [id number] [facility] [place] [address]        IMPORTANT INFORMATION REGARDING YOUR      PULMONARY VEIN ISOLATION ABLATION       Preoperative Physical : Tuesday 6-22-21, arrival time 2:30 at the [facility]      Your pre operative physical is scheduled with our EP Nurse Practitioner  Radha Albarran and Dr. Nate Reina     Please bring these instructions with you to your appointment.    You will meet with a Nurse for education after your visit with the EP Nurse Practitioner    You will need to have COVID testing completed within a certain timeframe prior to your procedure. Please not if this is not completed your procedure may need to be canceled or postponed. This is scheduled on Monday 6-28-21, 4:45 at the [facility].    Pulmonary Vein Isolation Ablation: Thursday 7-1-21 with Dr Reina      Please arrive at 6:00 am for registration and prep.    Your procedure is scheduled for 8:00 am. Please keep in mind this time is not exact, and this time may vary based on unforseen circumstances.     Please arrive at [facility] [address] , [place] [address]    [facility], has a drop off location for patients at the main entrance of the hospital and patient/visitor parking located near the hospital. Please see attached sheet for directions and parking information. You will need to check in at the main entrance of the hospital, where you will be directed by staff to the appropriate location for your procedure    Due to current COVID 19 restriction,  is no longer available to park your car. We apologize for this inconvenience, we are taking all steps necessary to keep patients and staff safe during this  time    Preparing at Home for your Pulmonary Vein Isolation Ablation:    Have nothing to eat or drink after midnight the night prior to your ablation.    You will need to bring a current list of your medications with you for our admissions process the day of your procedure, please do not bring any of your own medications with you to the hospital.    The hospital cannot store your valuables, so please leave them at home    You will need to take off your jewelry prior to your procedure, we advise leaving your jewelry at home, as we cannot store your valuables    We ask that you please shower the morning of your procedure, or the night before.    You will have a yates catheter placed in your bladder prior to the procedure.  Please let us know if you have had difficulty with a yates catheter in the past.    If you use a CPAP machine for breathing while you are sleeping, please bring this with you to the hospital.    Depending on the duration of your procedure and your recovery immediately after your procedure, you may be discharged home the same day or instructed to spend the night for observation.    Medication Instructions:    You will need to start an anticoagulant medication 4 weeks prior to your procedure and for at least 3 months after your procedure.  This is to prevent blood clots from forming prior to your procedure and after your procedure as well.  A prescription for Eliquis 5 mg twice daily will be sent to your pharmacy.  You will need to start on Thursday Jane 3.  Please try to take as close to 12 hours as possible.  This medication will cause you to have slowed clotting times, please be mindful with any injuries, scrapes, cuts or bumps as you may notice that you might bleed for longer and take longer to form a scab/clot.  Please call with any questions regarding this medication    I have enclosed a 10$ coupon card for you to use with Eliquis, it should make your copay no more than 10$.  Please use the 10$  copay card for all refills, this will be good to use for 2 years.  You will need to call the number on the front of the card prior to going to the pharmacy, please also bring this card with you to the pharmacy and keep for your records.    You may not be able to have this procedure if you skip a dose of your Eliquis within 30 days of your procedure. We encourage you to make sure you are taking this medication without skipping any doses, if you happen to miss a dose prior to your procedure please contact us to make further arrangements and If you have any questions regarding your medication prior to your procedure please contact me at the number listed below.    Please DO NOT take any of your medication the morning of your procedure EXCEPT your Eliquis the morning of your ablation.    It is important to remain on your anticoagulation medication (Eliquis) uninterrupted before and after your ablation, PLEASE DO NOT STOP THIS MEDICATION.    You will need to start Pepcid 20mg twice daily 3 days prior to your ablation, and continue this for 3 weeks after your ablation. This medication will be sent to your pharmacy at your the time of your office visit in the clinic. It is important to take this medication to help reduce the discomfort caused through the process of your ablation, that potentially can cause irritation to the esophagus.    Postoperative Information :      The hospital staff have asked that you arrange for your family/health  to be available to pick you up around 6:00pm the day of your procedure if you are able to be discharged that same day, or if you are required to spend the night in the hospital at 9:00am the following morning    Detailed information regarding discharge instructions will be given to you when you leave the hospital.    Please plan on taking 5-7 days after the procedure for recuperation.     We will be following up with you after your ablation procedure either in the clinic or via  phone, currently we have a prearranged office visit scheduled with you on Monday 7-5-21, 1:15 at the [facility].. Please be aware the appointment time and date may be changed if your in clinic office visit will be changes to a telephone follow up visit. We ask that you do not drive until this visit is completed.    Follow up:  After the ablation you will be scheduled to see:    EP Nurse Clinician for an assessment and suture removal, if appropriate.    EP Nurse Practitioner in 4-6 weeks;  A 2 week heart monitor will be ordered for you to wear about 8 weeks after the ablation.    Electrophysiologist or the Electrophysiology Nurse Practitioner 3 months after the ablation or the Electrophysiology Nurse Practitioner 3 months after the ablation.    If you have any questions regarding scheduling please call the scheduling line at [phone]    Travel Restrictions following procedure :      No traveling by plane for 4 weeks.    Please refrain from extended travel outside the [place] for 3 weeks.    Contact the clinic for questions.    Contact Information :      Please call with any questions or concerns regarding the procedure:Nancy Arreola RN [phone]    Scheduling questions or concerns: Cardiovascular Procedural Schedulers at [phone]    For urgent needs after clinic hours or on weekends please contact the physician on call at [phone]                        Thank you for choosing Critical access hospital.                                                                                                [facility]    ADDRESS  [address] [place] [id number]    [facility] is the flagship location for [facility], and is comprised of two hospitals and dozens of adult specialty clinics on both the [place] and West rios of the [facility] in [place].    This  location offers the widest range of hospital and clinical services provided through [facility]. Complete services range from primary care, emergency care and the delivery of thousands of babies each year, to care of patients with the most complex conditions. Areas of specialization include solid organ and blood and marrow transplantation, heart disease and cancer.    DIRECTIONS AND PARKING  Both self-park and  parking are available at the [facility] building. Self park and  rates are the same. See the map for  locations.    Tips are not accepted.    Our ambassadors will be available at the main clinic entrance on the [place] to help you find your clinic.    If you choose to park your car yourself:    The parking ramps listed on the map in this packet offer reduced parking rates with a validated ticket - refer to chart below. Remember to bring your ticket to the clinic for validation. If you lose your ticket, please ask your clinic staff to provide you with some verification.    When no attendant is on duty, please pay at the Exit Payment Station located at the exit. Weekly and monthly parking passes are available for patients and their visitors through the Parking Office information line at [phone] ([phone]).    A- [facility]  [address] [place] [address]    B- Patient/Visitor Parking Ramp [place]  [address] [place] [address]          For more information or to access this on [facility] web site please visit:  [email or web address]

## 2021-06-21 NOTE — LETTER
Letter by Nancy Arreola RN at      Author: Nancy Arreola RN Service: -- Author Type: --    Filed:  Encounter Date: 5/18/2021 Status: (Other)       Tania Kaufman  [id number] [facility] [address]        IMPORTANT INFORMATION REGARDING YOUR      PULMONARY VEIN ISOLATION ABLATION       Preoperative Physical : Tuesday 6-22-21, arrival time 2:30 at the [facility]      Your pre operative physical is scheduled with our EP Nurse Practitioner  Radha Albarran and Dr. Nate Reina     Please bring these instructions with you to your appointment.    You will meet with a Nurse for education after your visit with the EP Nurse Practitioner    You will need to have COVID testing completed within a certain timeframe prior to your procedure. Please not if this is not completed your procedure may need to be canceled or postponed. This is scheduled on Monday 6-28-21, 4:45 at the [facility] [place].    Pulmonary Vein Isolation Ablation: Thursday 7-1-21 with Dr Reina      Please arrive at 6:00 am for registration and prep.    Your procedure is scheduled for 8:00 am. Please keep in mind this time is not exact, and this time may vary based on unforseen circumstances.     Please arrive at [facility] [address] , [place] [address]    [facility], has a drop off location for patients at the main entrance of the hospital and patient/visitor parking located near the hospital. Please see attached sheet for directions and parking information. You will need to check in at the main entrance of the hospital, where you will be directed by staff to the appropriate location for your procedure    Due to current COVID 19 restriction,  is no longer available to park your car. We apologize for this inconvenience, we are taking all steps necessary to keep patients and staff safe during this  time    Preparing at Home for your Pulmonary Vein Isolation Ablation:    Have nothing to eat or drink after midnight the night prior to your ablation.    You will need to bring a current list of your medications with you for our admissions process the day of your procedure, please do not bring any of your own medications with you to the hospital.    The hospital cannot store your valuables, so please leave them at home    You will need to take off your jewelry prior to your procedure, we advise leaving your jewelry at home, as we cannot store your valuables    We ask that you please shower the morning of your procedure, or the night before.    You will have a yates catheter placed in your bladder prior to the procedure.  Please let us know if you have had difficulty with a yates catheter in the past.    If you use a CPAP machine for breathing while you are sleeping, please bring this with you to the hospital.    Depending on the duration of your procedure and your recovery immediately after your procedure, you may be discharged home the same day or instructed to spend the night for observation.    Medication Instructions:    You will need to start an anticoagulant medication 4 weeks prior to your procedure and for at least 3 months after your procedure.  This is to prevent blood clots from forming prior to your procedure and after your procedure as well.  A prescription for Eliquis 5 mg twice daily will be sent to your pharmacy.  You will need to start on Thursday Jane 3.  Please try to take as close to 12 hours as possible.  This medication will cause you to have slowed clotting times, please be mindful with any injuries, scrapes, cuts or bumps as you may notice that you might bleed for longer and take longer to form a scab/clot.  Please call with any questions regarding this medication    There are 2 coupon cards enclosed with this letter.  The first is for free 30 trial, and the second is for 10.00$ co-pay.   Please use the free 30 day trial card first as you can only use with your first fill of Eliquis, bring the card with you to the pharmacy and give to them to get your free 30 days.  Please use the 10$ copay card for all other refills, this will be good to use for 2 years.  You will need to call the number on the front of the card prior to going to the pharmacy, please also bring this card with you to the pharmacy and keep for your recors.    You may not be able to have this procedure if you skip a dose of your Eliquis within 30 days of your procedure. We encourage you to make sure you are taking this medication without skipping any doses, if you happen to miss a dose prior to your procedure please contact us to make further arrangements and If you have any questions regarding your medication prior to your procedure please contact me at the number listed below.    Please DO NOT take any of your medication the morning of your procedure EXCEPT your Eliquis the morning of your ablation.    It is important to remain on your anticoagulation medication (Eliquis) uninterrupted before and after your ablation, PLEASE DO NOT STOP THIS MEDICATION.    You will need to start Pepcid 20mg twice daily 3 days prior to your ablation, and continue this for 3 weeks after your ablation. This medication will be sent to your pharmacy at your the time of your office visit in the clinic. It is important to take this medication to help reduce the discomfort caused through the process of your ablation, that potentially can cause irritation to the esophagus.    Postoperative Information :      The hospital staff have asked that you arrange for your family/health  to be available to pick you up around 6:00pm the day of your procedure if you are able to be discharged that same day, or if you are required to spend the night in the hospital at 9:00am the following morning    Detailed information regarding discharge instructions will be given to  you when you leave the hospital.    Please plan on taking 5-7 days after the procedure for recuperation.     We will be following up with you after your ablation procedure either in the clinic or via phone, currently we have a prearranged office visit scheduled with you on Monday 7-5-21, 1:15 at the [facility].. Please be aware the appointment time and date may be changed if your in clinic office visit will be changes to a telephone follow up visit. We ask that you do not drive until this visit is completed.    Follow up:  After the ablation you will be scheduled to see:    EP Nurse Clinician for an assessment and suture removal, if appropriate.    EP Nurse Practitioner in 4-6 weeks;  A 2 week heart monitor will be ordered for you to wear about 8 weeks after the ablation.    Electrophysiologist or the Electrophysiology Nurse Practitioner 3 months after the ablation or the Electrophysiology Nurse Practitioner 3 months after the ablation.    If you have any questions regarding scheduling please call the scheduling line at [phone]    Travel Restrictions following procedure :      No traveling by plane for 4 weeks.    Please refrain from extended travel outside the [place] for 3 weeks.    Contact the clinic for questions.    Contact Information :      Please call with any questions or concerns regarding the procedure:Nancy Arreola RN [phone]    Scheduling questions or concerns: Cardiovascular Procedural Schedulers at [phone]    For urgent needs after clinic hours or on weekends please contact the physician on call at [phone]                        Thank you for choosing [facility].    See Directions Attachment Below  [facility]    ADDRESS  [address] [place] [id number]    [facility] is the flagship location for [facility], and is comprised of two  hospitals and dozens of adult specialty clinics on both the East and West rios of the [facility] in [place].    This location offers the widest range of hospital and clinical services provided through [facility]. Complete services range from primary care, emergency care and the delivery of thousands of babies each year, to care of patients with the most complex conditions. Areas of specialization include solid organ and blood and marrow transplantation, heart disease and cancer.    DIRECTIONS AND PARKING  Both self-park and  parking are available at the [facility] building. Self park and  rates are the same. See the map for  locations.    Tips are not accepted.    Our ambassadors will be available at the main clinic entrance on the [place] to help you find your clinic.    If you choose to park your car yourself:    The parking ramps listed on the map in this packet offer reduced parking rates with a validated ticket - refer to chart below. Remember to bring your ticket to the clinic for validation. If you lose your ticket, please ask your clinic staff to provide you with some verification.    When no attendant is on duty, please pay at the Exit Payment Station located at the exit. Weekly and monthly parking passes are available for patients and their visitors through the Parking Office information line at [phone]).    A- [facility]  [address] [place] [address]    B- Patient/Visitor Parking Ramp [place]  [address] [place] [address]          For more information or to access this on [facility] web site please visit:  [email or web address]                                              Information on Atrial Fibrillation Ablations    What is Catheter  Ablation?             A Catheter Ablation is a procedure that treats certain types of abnormal heart rhythms (arrhythmia). There are several components to the procedure, but the final purpose is target and destroy (ablate) small areas of your heart muscle that are causing the arrhythmia.     Why is an Ablations Done?  A catheter ablation is an effective way to treat some types of abnormal heart rhythms. An ablation is a relatively low risk procedure that may permanently cure your abnormal heart rhythm.  The ablation process damages the heart cells which results in scarring of that area. The scar is electrically inactive and can produce a permanent cure for the abnormal rhythm.  Ablation procedures can help avoid the need for rhythm medications and give patients the ability to return to their normal activity and live an active life. In patients that do not have symptoms, ablations are not typically done as there may still be an increased risk of stroke.    Why is Catheter Ablation Done?  Sometimes, the hearts electrical system does not work properly.  This can cause abnormal heart rhythms, called arrhythmias.  During an arrhythmia, the heart may beat too fast, too slowly, or irregularly.  Your doctor has recommended catheter ablation to treat a rapid (fast) heart rhythm, or tachycardia.      How Catheter Ablation Is Done  Catheter ablation uses thin, flexible wires called electrode catheters to find and destroy (ablate) problem cells. Here is how the procedure is done:    The pulmonary veins will be treated first. There are currently two tools used to ablate around the pulmonary veins.  1) Radiofrequency catheter will heat the tissue.  2)  Cryo-balloon catheter will freeze the tissue.       Testing will be done to confirm that effective treatment has been delivered.       Further testing may be performed to see if a fib is still present or if some other rhythm problem such as atrial flutter is present. If an ongoing  rhythm problem is discovered then further ablation can be done to isolate and destroy those areas responsible for the arrhythmia.       Your Experience during Catheter Ablation  In most cases, catheter ablations are done in an electrophysiology (EP) lab. Depending on your arrhythmia it often takes 4-6 hours, and sometimes longer.     The procedural area: You will be transferred back to the procedure room once you have been appropriately prepped by the nursing staff_ and you are ready for your ablation.     Sedation: You to be put completely asleep for your ablation using general anesthesia.    While you are asleep a bladder catheter will be inserted. This will be removed after your bedrest is complete.    Inserting the catheters: You will have 3-4 catheters inserted into the veins. Catheter locations can include the shoulder, neck, and groins. Catheters are guided to the heart with the help of x-ray monitors.    Finishing up: When the procedure is finished, the catheters are taken out of your body. A special closure device may be used to help seal these puncture sites. Youre then taken to your room to rest, and will be cared for by an intensive care unit (ICU) nurse.      Risks and Complications  The risks of catheter ablation are fairly low compared to the benefits you receive. Possible risks and complications include:    Common (up to 10%)  o Bleeding or bruising  o Shortness of breath  o Heartburn    Uncommon (< 1%)  o Blood clots  o A slow heart rhythm (requiring a permanent pacemaker)  o Perforation of the heart muscle, blood vessel, or lung (may require an emergency procedure)  o Stroke  o Damage to a heart valve   o Heart attack, also known as acute myocardial infarction, or AMI   o Death (extremely rare)    Before your Catheter Ablation  Before your catheter ablation, you will meet with the electrophysiologist (specially trained heart doctor) who will do the procedure. The provider or a registered nurse will  provide you with detailed instructions on how to prepare for this procedure, some of these instructions are listed below.    You will likely be told to stop or change your heart rhythm medications for a period of time before your ablation.     You may have testing done several days prior to your ablation or the morning of your ablation, such as an ECG, x-ray, blood tests, or echocardiogram.     You will not be allowed to eat or drink 8 hours before your ablation. You will be given further instructions by your physician or a registered nurse regarding the medication you will take the morning of your procedure.    You will need to arrange to have a  home from the hospital; you will not be permitted to drive after your procedure due to the sedation that you receive.     You are allowed to bring personal items and clothing to the hospital, but please refrain from bringing any valuables as the hospital is not responsible for any lost or stolen items.    You will need to bring a list of the names and dosages of the medication you are taking to the hospital.    It is important to mention to your doctor or registered nurse if you have any allergies, reactions to anesthesia, or have had history of bleeding problems.     Arriving at the Hospital the morning of your Catheter Ablation  You will need to check in at the 1st floor entrance at the [facility] the morning of your ablation, you will then be escorted to the 3rd floor where they will prepare you for your ablation and where your ablation will be performed.    When you arrive on the floor the doctor or registered nurse will meet with you prior to your ablation, this is a good time to ask questions and address any concerns you may have. You will then be asked to sign the consent form for your ablation, if this has not already been done.    The nursing staff will begin to prepare you for your procedure:    A nurse will shave and cleanse the  area where the ablation catheters will be placed. These areas are most commonly the left and right groin sites (the fold between your thigh and abdomen), and in some cases the chest, arm, and neck. This is done to reduce the risk of infection.      The nursing staff will start an intravenous (IV) line into a vein in your arm, which allows the staff to give you medication and sedatives to help you relax prior and during your ablation.    In some cases, the nursing staff will need to place a catheter that will drain urine from your bladder (Cali Catheter), which is required due to the length and complexity of the ablation you are having.    After Catheter Ablation  Recovery immediately after your ablation in the hospital  After your catheter ablation procedure, you will be taken to a recovery room. You may need to lie flat for 2-6 hours while the insertion sites close up. During this time, a nurse will monitor you, and you will be given medication to make you comfortable. This ablation requires you to stay in the hospital overnight.    Going Home  When it is time to go home, your will need to have an adult family member or friend drive you. Most people can walk, climb stairs, and perform light activity soon after catheter ablation. You can most likely return to your full routine within a few days. However, you may be told to avoid running, heavy lifting, and other strenuous activities for a short time. Please make sure to follow any specific activity restrictions provided by the medical staff at the time of your discharge from the hospital.    Doctor's typically advise that you not drive until your post procedure assessment visit in the clinic.     Avoid heavy physical activity and heavy lifting for several days after the procedure to allow your body to heal.    Ask your doctor when you can expect to return to work.    Take your temperature and check your incision for signs of infection (redness, swelling, drainage,  or warmth) every day for a week. It is normal to have a small bruise or lump where the catheter was inserted.    Take your medications exactly as directed. Do not skip doses or stop medication without consulting your physician prior.    Learn to take your own pulse and keep a record of your results.    Follow-Up  After your ablations you will have a follow up visit to see how you are doing, to assess your rhythm after your ablation, and to address any medication changes if necessary. In many cases, one ablation is enough to treat an arrhythmia. However, sometimes the problem returns or another is found. If this happens, you may need a second catheter ablation. Tell your healthcare provider if you have any new or returning symptoms.    Common Symptoms after Catheter Ablation  In the first few weeks after catheter ablation, you may feel mild chest fullness or aching. You may also feel as if your heart is skipping beats or your heartbeat may feel faster than normal. You may think that your heart rhythm problem is about to return. These sensations are normal and usually go away with time. Talk to your healthcare provider if you are concerned.    When to Call Your Doctor    Increased bleeding, bruising, or pain at the insertion site    Episodes of atrial fibrillation are common post procedure, call the clinic if episodes are lasting longer than 4-6 hours    Difficulty with your speech or walking, or any visual disturbance    Lightheaded, dizziness, or feeling faint    Shortness of breath or chest pain    Coldness, swelling, or numbness of the arm or leg near the insertion site    A bruise or lump at the insertion site that is larger than a walnut    A fever over 100 F                                                                                      Instructions for Patients Scheduled for Cardiac Procedures During the   COVID-19 Pandemic        Your Provider has determined that your condition warrants going ahead with  your procedure at this time.    You will need to be tested for COVID-19 48-72 hours (2-3 days) prior to your procedure.    We highly encourage you to get tested for COVID-19 at one of our designated [facility] testing sites. We process the tests within our lab, which allows us to get the results back quickly.     If you choose to get tested at a non-[facility] location, you will need to contact your primary care provider to make those arrangements to ensure the results are available to your cardiologist before you arrive for your procedure. If we DO NOT receive the results in time, your procedure may be postponed or canceled. The results will need to get faxed to [phone].    After testing, you will need to remain in self-quarantine until your procedure.    If you are notified of a positive COVID-19 result; you will need to call your provider at [phone] for further recommendations.      If the test is positive; PLEASE DO NOT PRESENT FOR YOUR PROCEDURE until you have been given further instructions.     You will not be called with negative results, so arrive as instructed unless otherwise notified.    Don't:    Don't invite visitors or friends into your home.    Don't leave your home unless absolutely necessary.    Don't share utensils and other household items with others in the home.  Do:    Wash your hands regularly with soap and water and use hand  with at least 60% alcohol if you don't have easy access to soap and water.     Disinfect surface areas daily, including doorknobs, electronics - especially phones, laptops and other devices.     Wash utensils and other items thoroughly.     Separate yourself from others in the household as best you can, including pets.    Keep your hands away from your face.     Practice all other prevention tips the CDC recommends, including covering your coughs and sneezes with a tissue or your sleeve and immediately throwing the tissue into the trash  and washing your hands.    Call the clinic if you develop any of the following symptoms before your procedure:    Fever     Cough    Shortness of breath    Sore throat    Runny or stuffy nose    Muscle or body aches    Headaches    Fatigue    Vomiting and diarrhea    These steps will help keep you and your family, other patients and hospital staff safe from COVID-19

## 2021-06-22 ENCOUNTER — COMMUNICATION - HEALTHEAST (OUTPATIENT)
Dept: CARDIOLOGY | Facility: CLINIC | Age: 56
End: 2021-06-22

## 2021-06-22 ENCOUNTER — SURGERY - HEALTHEAST (OUTPATIENT)
Dept: CARDIOLOGY | Facility: CLINIC | Age: 56
End: 2021-06-22

## 2021-06-22 ENCOUNTER — RECORDS - HEALTHEAST (OUTPATIENT)
Dept: ADMINISTRATIVE | Facility: OTHER | Age: 56
End: 2021-06-22

## 2021-06-22 ENCOUNTER — RECORDS - HEALTHEAST (OUTPATIENT)
Dept: LAB | Facility: CLINIC | Age: 56
End: 2021-06-22

## 2021-06-22 ENCOUNTER — OFFICE VISIT - HEALTHEAST (OUTPATIENT)
Dept: CARDIOLOGY | Facility: CLINIC | Age: 56
End: 2021-06-22

## 2021-06-22 ENCOUNTER — AMBULATORY - HEALTHEAST (OUTPATIENT)
Dept: CARDIOLOGY | Facility: CLINIC | Age: 56
End: 2021-06-22

## 2021-06-22 DIAGNOSIS — Z11.59 SCREENING FOR VIRAL DISEASE: ICD-10-CM

## 2021-06-22 DIAGNOSIS — I48.0 PAROXYSMAL ATRIAL FIBRILLATION (H): ICD-10-CM

## 2021-06-22 DIAGNOSIS — I48.3 TYPICAL ATRIAL FLUTTER (H): ICD-10-CM

## 2021-06-22 LAB
ANION GAP SERPL CALCULATED.3IONS-SCNC: 11 MMOL/L (ref 5–18)
ATRIAL RATE - MUSE: 234 BPM
BUN SERPL-MCNC: 11 MG/DL (ref 8–22)
CALCIUM SERPL-MCNC: 9.7 MG/DL (ref 8.5–10.5)
CHLORIDE BLD-SCNC: 105 MMOL/L (ref 98–107)
CO2 SERPL-SCNC: 24 MMOL/L (ref 22–31)
CREAT SERPL-MCNC: 0.8 MG/DL (ref 0.6–1.1)
DIASTOLIC BLOOD PRESSURE - MUSE: NORMAL
ERYTHROCYTE [DISTWIDTH] IN BLOOD BY AUTOMATED COUNT: 13.7 % (ref 11–14.5)
GFR SERPL CREATININE-BSD FRML MDRD: >60 ML/MIN/1.73M2
GLUCOSE BLD-MCNC: 107 MG/DL (ref 70–125)
HCT VFR BLD AUTO: 44.4 % (ref 35–47)
HGB BLD-MCNC: 14.7 G/DL (ref 12–16)
INTERPRETATION ECG - MUSE: NORMAL
MCH RBC QN AUTO: 29.9 PG (ref 27–34)
MCHC RBC AUTO-ENTMCNC: 33.1 G/DL (ref 32–36)
MCV RBC AUTO: 90 FL (ref 80–100)
P AXIS - MUSE: 89 DEGREES
PLATELET # BLD AUTO: 243 THOU/UL (ref 140–440)
PMV BLD AUTO: 12 FL (ref 8.5–12.5)
POTASSIUM BLD-SCNC: 4.3 MMOL/L (ref 3.5–5)
PR INTERVAL - MUSE: NORMAL
QRS DURATION - MUSE: 92 MS
QT - MUSE: 300 MS
QTC - MUSE: 400 MS
R AXIS - MUSE: 109 DEGREES
RBC # BLD AUTO: 4.91 MILL/UL (ref 3.8–5.4)
SODIUM SERPL-SCNC: 140 MMOL/L (ref 136–145)
SYSTOLIC BLOOD PRESSURE - MUSE: NORMAL
T AXIS - MUSE: 80 DEGREES
VENTRICULAR RATE- MUSE: 107 BPM
WBC: 9.4 THOU/UL (ref 4–11)

## 2021-06-22 ASSESSMENT — MIFFLIN-ST. JEOR: SCORE: 1574.99

## 2021-06-23 ENCOUNTER — AMBULATORY - HEALTHEAST (OUTPATIENT)
Dept: CARDIOLOGY | Facility: CLINIC | Age: 56
End: 2021-06-23

## 2021-06-23 ENCOUNTER — PREP FOR PROCEDURE (OUTPATIENT)
Facility: CLINIC | Age: 56
End: 2021-06-23

## 2021-06-23 DIAGNOSIS — I48.0 PAROXYSMAL ATRIAL FIBRILLATION (H): Primary | ICD-10-CM

## 2021-06-23 DIAGNOSIS — Z11.59 ENCOUNTER FOR SCREENING FOR OTHER VIRAL DISEASES: ICD-10-CM

## 2021-06-23 RX ORDER — SODIUM CHLORIDE 9 MG/ML
INJECTION, SOLUTION INTRAVENOUS CONTINUOUS
Status: CANCELLED | OUTPATIENT
Start: 2021-06-23

## 2021-06-26 NOTE — TELEPHONE ENCOUNTER
Incoming call from patient stating that she has only been taking Eliquis one time daily since she started on 6-3-21 in preparation for her PVI with Dr. Reina on 7-1-21.    She notes that she double checked all her paper work last night and realized that she had not been taking appropriately, she started two times a day dosing yesterday.  She is scheduled for her H&P with [facility] today, will discuss further at that time.  Did review the possibility of a PARRISH prior to PVI to ensure that no thrombus is present.  She states understanding.  She  H&P/education note for further details.

## 2021-06-28 ENCOUNTER — COMMUNICATION - HEALTHEAST (OUTPATIENT)
Dept: CARDIOLOGY | Facility: CLINIC | Age: 56
End: 2021-06-28

## 2021-06-28 DIAGNOSIS — I48.0 PAROXYSMAL ATRIAL FIBRILLATION (H): ICD-10-CM

## 2021-06-28 RX ORDER — FLECAINIDE ACETATE 100 MG/1
150 TABLET ORAL 2 TIMES DAILY
Qty: 180 TABLET | Refills: 3 | Status: SHIPPED
Start: 2021-06-28 | End: 2021-07-29

## 2021-06-28 NOTE — PROGRESS NOTES
Progress Notes by Florecita Cox CNP at 3/5/2020  9:10 AM     Author: Florecita Cox CNP Service: -- Author Type: Nurse Practitioner    Filed: 3/5/2020 11:32 AM Encounter Date: 3/5/2020 Status: Signed    : Florecita Cox CNP (Nurse Practitioner)         Thank you, Dr. Arango, for asking the [facility] Heart Care Electrophysiology team to see Ms. Tania Kaufman to evaluate atrial fibrillation.      Assessment/Recommendations   Assessment/Plan:    Paroxysmal atrial fibrillation: Event monitor confirms atrial fibrillation with rapid ventricular response correlating with symptoms of palpitations, dyspnea on exertion, and lightheadedness.  Significant decrease in AF burden since starting metoprolol.  Continue metoprolol succinate 12.5 mg twice daily.  She was instructed to keep a log of AF episodes and to call if they increase in frequency or duration.    We had a lengthy discussion of the physiology and natural progression of atrial fibrillation and treatment options including rate control versus rhythm control depending upon the presence of symptoms.  We further discussed rhythm control with medications or pulmonary vein isolation ablation.  We discussed avoidance of possible triggers.  We discussed pulmonary vein isolation ablation procedure utilizing cryo or radiofrequency including the <1% risk for major complication, including but not limited to, stroke, myocardial or esophageal perforation, pulmonary vein stenosis, phrenic nerve injury, or death.  We also discussed the expected recovery and follow-up.  She would be a good candidate for PVI as first-line therapy.  She was given information on A. fib and PVI to review at home.    She was reassured that atrial fibrillation is not life-threatening, but carries an increased risk for stroke.  She has a [id number] score of 1 for female gender; per current guidelines anticoagulation not indicated.  Discontinue Eliquis.  Start aspirin 81 mg daily for  stroke prophylaxis.    Follow up in 3 months     History of Present Illness/Subjective    Ms. Tania Kaufman is a 54 y.o. female who comes in today for EP consultation of atrial fibrillation.  She has a history of atrial fibrillation anxiety, diverticulitis, IBS, and recurrent kidney stones.  She was diagnosed with atrial fibrillation in February 2020, though began having palpitations in the summer 2019.  Symptoms consist of tachypalpitations, dyspnea on exertion, and lightheadedness.  Episodes were occurring every 1 to 2 months, but have been increasing in frequency and duration, occurring almost daily and lasting up to 6 hours.  She was started on low-dose metoprolol succinate and Eliquis on 2/21/2020.  Echo shows normal structure and function.  Event monitor documented atrial fibrillation with rapid ventricular response.  She has a brother who was diagnosed with atrial fibrillation in his 50s.    Eden states that she has had a significant decrease in A. fib episodes since starting on metoprolol; she has had only a couple of episodes lasting less than 30 minutes.  She denies chest discomfort, palpitations, abdominal fullness/bloating or peripheral edema, shortness of breath, paroxysmal nocturnal dyspnea, orthopnea, lightheadedness, dizziness, pre-syncope, or syncope.  She drinks very little caffeine and rarely drinks any alcohol.    Cardiographics (EKG personally reviewed):  EKG done 2/10/2020 shows sinus rhythm at 62 bpm, QT/QTc interval measures 418/424 ms    Event monitor worn for 1 week beginning on 2/17/2020 shows paroxysmal atrial fibrillation with rapid ventricular response 105-160 bpm.  Symptoms noted for all episodes of A. fib.  AF burden approximately 6%.  No significant bradycardia or pauses.  No significant ventricular arrhythmia.    ECHO done 2/24/2020:    Left ventricle ejection fraction is normal. The calculated left ventricular ejection fraction is 58%.    The right ventricle is mildly dilated.  The systolic function is mildly reduced.    No hemodynamically significant valvular heart abnormalities.    No previous study for comparison.       Physical Examination Review of Systems   Vitals:    03/05/20 0905   BP: 104/66   Pulse: 68   Resp: 18     Body mass index is 35.28 kg/m .  Wt Readings from Last 3 Encounters:   03/05/20 212 lb (96.2 kg)   02/24/20 215 lb (97.5 kg)   02/21/20 215 lb (97.5 kg)     General Appearance:   Alert, well-appearing and in no acute distress.   HEENT: Atraumatic, normocephalic.  No scleral icterus, normal conjunctivae, EOMs intact, PERRL.  Mucous membranes pink and moist.  No obvious thyromegaly.   Chest/Lungs:   Chest symmetric, spine straight.  Respirations unlabored.  Lungs are clear to auscultation.   Cardiovascular:   Regular rate and rhythm.  Normal first and second heart sounds with no murmurs, rubs, or gallops; radial and posterior tibial pulses are intact, No JVD, no edema.   Abdomen:  Soft, nontender, nondistended, bowel sounds present.   Extremities: No cyanosis or clubbing.   Musculoskeletal: Moves all extremities.  Gait steady.   Skin: Warm, dry, intact.    Neurologic: Mood and affect are appropriate.  Alert and oriented to person, place, time, and situation.    General: Weight Gain  Eyes: WNL  Ears/Nose/Throat: WNL  Lungs: Cough  Heart: Irregular Heartbeat(palpatations)  Stomach: Diarrhea  Bladder: WNL  Muscle/Joints: WNL  Skin: WNL  Nervous System: WNL  Mental Health: WNL     Blood: WNL       Medical History  Surgical History Family History Social History   Past Medical History:   Diagnosis Date   ? Anxiety    ? Biliary colic    ? Diverticulitis    ? Gallstones    ? Irritable bowel syndrome    ? Irritable bowel syndrome with diarrhea    ? Kidney stone     at age 42   ? 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 PONV (postoperative nausea and vomiting)     Past Surgical History:   Procedure Laterality Date   ? NO PAST SURGERIES     ? [place]  CYSTO/URETERO W/LITHOTRIPSY &INDWELL STENT INSRT Right 6/11/2019    Procedure: CYSTOSCOPY, RIGHT RETROGRADE,  RIGHT URETEROSCOPY WITH LASER LITHOTRIPSY AND STENT INSERTION;  Surgeon: John Gutierrez MD;  Location: [facility] OR;  Service: Urology   ? wisdom teeth removal      Family History   Problem Relation Age of Onset   ? Cancer Mother         skin   ? Pacemaker Mother         4 open heart surgeries    ? Diverticulitis Father    ? Breast cancer Other 40        great aunt   ? Urolithiasis Brother         recurrent   ? Gout Neg Hx     Social History     Tobacco Use   ? Smoking status: Never Smoker   ? Smokeless tobacco: Never Used   Substance Use Topics   ? Alcohol use: Yes     Comment: social only   ? Drug use: No          Medications  Allergies   Current Outpatient Medications   Medication Sig Dispense Refill   ? GLUCOSAMINE SULFATE (GLUCOSAMINE ORAL) Take by mouth. prn     ? L.ACID/L.CASEI/B.BIF/B.LUKE/FOS (PROBIOTIC BLEND ORAL) Take by mouth.     ? melatonin 3 mg Tab tablet Take 3 mg by mouth bedtime as needed.     ? metoprolol succinate (TOPROL-XL) 25 MG Take 0.5 tablets (12.5 mg total) by mouth daily. 90 tablet 3   ? multivitamin therapeutic (THERAGRAN) tablet Take 1 tablet by mouth daily.     ? NON FORMULARY            ? aspirin 81 MG EC tablet Take 1 tablet (81 mg total) by mouth daily.  0     No current facility-administered medications for this visit.     No Known Allergies      Lab Results    Chemistry/lipid CBC Cardiac Enzymes/BNP/TSH/INR   Lab Results   Component Value Date    CREATININE 0.88 02/20/2020    BUN 11 02/20/2020     02/20/2020    K 4.3 02/20/2020     02/20/2020    CO2 25 02/20/2020     Creatinine (mg/dL)   Date Value   02/20/2020 0.88   07/03/2019 1.01   06/03/2019 0.77   12/05/2016 0.79      Lab Results   Component Value Date    WBC 4.7 02/20/2020    HGB 13.1 02/20/2020    HCT 40.2 02/20/2020    MCV 91 02/20/2020     02/20/2020    Lab Results   Component Value  Date    TSH 4.52 02/20/2020      45 minutes were spent face to face in this visit with more than 50% spent discussing diagnoses as listed above, counseling, and coordination of care.    This note has been dictated using voice recognition software. Any grammatical, typographical, or context distortions are unintentional and inherent to the software.

## 2021-06-28 NOTE — PROGRESS NOTES
Progress Notes by Debbie Perkins MD at 2/21/2020  7:50 AM     Author: Debbie Perkins MD Service: -- Author Type: Physician    Filed: 2/21/2020  8:30 AM Encounter Date: 2/21/2020 Status: Addendum    : Debbie Perkins MD (Physician)    Related Notes: Original Note by Debbie Perkins MD (Physician) filed at 2/21/2020  8:29 AM           HEART CARE NOTE    Thank you, Dr. Arango, for asking the [facility] Heart Care team to see Tania Kaufman to evaluate atrial flutter.        Assessment/Recommendations     1. Atrial flutter  Assessment / Plan    Symptoms of palpitations since summer 2019; Holter placed which was remarkable for aflutter    Will start metoprolol succinate 12.5 mg daily    [id number] score 1 will start apixaban 5 mg two times a day    Will echo to evaluate underlying structure and function    Will refer to afib clinic    History of Present Illness/Subjective    Ms. Tania Kaufman is a 54 y.o. female with a PMhx significant for anxiety, diverticulitis, IBS, and recurent kidney stones who presents to Heart Care clinic for evaluation of atrial flutter. Of note, mrs. Kaufman had a Holter monitor placed for palpitations which subsequently revealed new onset atrial flutter.     Today, Mrs. Kaufman denies any current chest pain, palpitations, lightheadedness/dizziness, dyspnea. She reports that her symptoms of palpitations started in Summerr 2019. She reports associated symptoms of dyspnea and dizziness. She reports that her symptoms initially would only occur every 1-2 months, but they have increased in frequency and can occur daily. She reports a duration of minutes to hours. She denies HF symptoms of orthopnea, PND, edema.     ECG: Personally reviewed. normal EKG, normal sinus rhythm, there are no previous tracings available for comparison.       Physical Examination Review of Systems     Vitals:    02/21/20 0803   BP: 112/70   Pulse: 86   Resp: 16      Body mass index is 35.78 kg/m .  Wt Readings from Last 3 Encounters:   02/10/20 213 lb (96.6 kg)   07/03/19 200 lb (90.7 kg)   06/11/19 208 lb (94.3 kg)       General Appearance:   Alert, cooperative, no distress, appears stated age   Head/ENT: Normocephalic, without obvious abnormality. Membranes moist.      EYES:  No scleral icterus   Neck: Supple, symmetrical, trachea midline, no adenopathy, thyroid: not enlarged, symmetric, no carotid bruit or JVD   Chest/Lungs:   lungs are clear to auscultation, respirations unlabored. No tenderness or deformity   Cardiovascular:   Regular rhythm, S1, S2 normal, no murmur, rub or gallop.   Abdomen:  Soft, non-tender, bowel sounds active all four quadrants,  no masses, no organomegaly   Extremities: no cyanosis or clubbing. No edema   Skin: Skin color, texture, turgor normal, no rashes or lesions.    Neurologic: Alert and oriented x 3, moving all four extremities.    Psychiatric: Normal affect.      A complete 10 systems ROS was reviewed  And is negative except what is listed in the HPI.        Medical History  Surgical History Family History Social History   Past Medical History:   Diagnosis Date   ? Anxiety    ? Biliary colic    ? Diverticulitis    ? Gallstones    ? Irritable bowel syndrome    ? Irritable bowel syndrome with diarrhea    ? Kidney stone     at age 42   ? 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 PONV (postoperative nausea and vomiting)     Past Surgical History:   Procedure Laterality Date   ? NO PAST SURGERIES     ? [place] CYSTO/URETERO W/LITHOTRIPSY &INDWELL STENT INSRT Right 6/11/2019    Procedure: CYSTOSCOPY, RIGHT RETROGRADE,  RIGHT URETEROSCOPY WITH LASER LITHOTRIPSY AND STENT INSERTION;  Surgeon: John Gutierrez MD;  Location: [facility];  Service: Urology   ? wisdom teeth removal      no family history of premature coronary artery disease Social History     Socioeconomic History   ? Marital status:      Spouse  name: Not on file   ? Number of children: Not on file   ? Years of education: Not on file   ? Highest education level: Not on file   Occupational History   ? Occupation:     Social Needs   ? Financial resource strain: Not on file   ? Food insecurity:     Worry: Not on file     Inability: Not on file   ? Transportation needs:     Medical: Not on file     Non-medical: Not on file   Tobacco Use   ? Smoking status: Never Smoker   ? Smokeless tobacco: Never Used   Substance and Sexual Activity   ? Alcohol use: Yes     Comment: social only   ? Drug use: No   ? Sexual activity: Not on file   Lifestyle   ? Physical activity:     Days per week: Not on file     Minutes per session: Not on file   ? Stress: Not on file   Relationships   ? Social connections:     Talks on phone: Not on file     Gets together: Not on file     Attends Bahai service: Not on file     Active member of club or organization: Not on file     Attends meetings of clubs or organizations: Not on file     Relationship status: Not on file   ? Intimate partner violence:     Fear of current or ex partner: Not on file     Emotionally abused: Not on file     Physically abused: Not on file     Forced sexual activity: Not on file   Other Topics Concern   ? Not on file   Social History Narrative   ? Not on file           Lab Results    Chemistry/lipid CBC Cardiac Enzymes/BNP/TSH/INR   Lab Results   Component Value Date    CHOL 178 09/11/2014    HDL 75 09/11/2014    LDLCALC 87 09/11/2014    TRIG 82 09/11/2014    CREATININE 0.88 02/20/2020    BUN 11 02/20/2020    K 4.3 02/20/2020     02/20/2020     02/20/2020    CO2 25 02/20/2020    Lab Results   Component Value Date    WBC 4.7 02/20/2020    HGB 13.1 02/20/2020    HCT 40.2 02/20/2020    MCV 91 02/20/2020     02/20/2020    Lab Results   Component Value Date    TSH 4.52 02/20/2020     No results found for: CKTOTAL, CKMB, CKMBINDEX, TROPONINI       Weight:    Wt Readings from Last 3  Encounters:   02/10/20 213 lb (96.6 kg)   07/03/19 200 lb (90.7 kg)   06/11/19 208 lb (94.3 kg)       Allergies  No Known Allergies      Surgical History  Past Surgical History:   Procedure Laterality Date   ? NO PAST SURGERIES     ? [place] CYSTO/URETERO W/LITHOTRIPSY &INDWELL STENT INSRT Right 6/11/2019    Procedure: CYSTOSCOPY, RIGHT RETROGRADE,  RIGHT URETEROSCOPY WITH LASER LITHOTRIPSY AND STENT INSERTION;  Surgeon: John Gutierrez MD;  Location: [facility];  Service: Urology   ? wisdom teeth removal         Social History  Tobacco:   Social History     Tobacco Use   Smoking Status Never Smoker   Smokeless Tobacco Never Used    Alcohol:   Social History     Substance and Sexual Activity   Alcohol Use Yes    Comment: social only    Illicit Drugs:   Social History     Substance and Sexual Activity   Drug Use No       Family History  Family History   Problem Relation Age of Onset   ? Cancer Mother         skin   ? Pacemaker Mother         4 open heart surgeries    ? Diverticulitis Father    ? Breast cancer Other 40        great aunt   ? Urolithiasis Brother         recurrent   ? Gout Neg Hx           DebbieAnu Perkins on 2/21/2020      cc: Lashonda Arango MD,

## 2021-06-28 NOTE — PROGRESS NOTES
Progress Notes by Ally Booth CNP at 2/10/2020  6:10 PM     Author: Ally Booth CNP Service: -- Author Type: Nurse Practitioner    Filed: 2/10/2020  7:49 PM Encounter Date: 2/10/2020 Status: Signed    : Ally Booth CNP (Nurse Practitioner)       Chief Complaint   Patient presents with   ? Irregular Heart Beat     sometimes lasts for long periods of time started around june or july last year after surgery, off and on since then        ASSESSMENT & PLAN:   Diagnoses and all orders for this visit:    Irregular heart beat  -     Electrocardiogram Perform - Clinic        MDM:  Normal EKG today.  No evidence of long QT, previous MI.  Commended sending [employer] message to primary care for Holter monitor.  Given guidelines and when to go to emergency room.  Is well-appearing today.  TSH was checked and was normal 1 month prior to the onset of symptoms and has had no other significant symptoms associated with hyperthyroidism such as unplanned weight loss.    Supportive care discussed.  See discharge instructions below for specific recommendations given.    At the end of the encounter, I discussed results, diagnosis, medications. Discussed red flags for immediate return to clinic/ER, as well as indications for follow up if no improvement. Patient and/or caregiver understood and agreed to plan. Patient was stable for discharge.    SUBJECTIVE    HPI:  HPI  Tania OJSE Kaufman presents to the walk-in clinic with   Chief Complaint   Patient presents with   ? Irregular Heart Beat     sometimes lasts for long periods of time started around june or july last year after surgery, off and on since then    Pt concerned about irregular heart beats, started last July     Heart would be quickly and irregularly     Beating very quickly and it feels like she can't catch her breath     Yesterday started at the end of snow tubing for 2 hours and lasted until bedtime, about 2 hours.      Has had 3 episodes in the last week.      Not currently experiencing this feeling today/now.     Some dizziness and shortness of breath when episodes happen.  No syncope nor chest pain.    Hx of A-fib runs in family, including brother in his 50s.      Has not previously discussed with primary care or had a work-up.  No previous EKGs on file.  No history of hypertension, MI, chest pain.  TSH checked in June, 2019 was 2.97.    No particular increase in stress.      Not ill.      Has not checked pulse with any of these episodes.    Does not usually drink caffeine and rarely drinks alcohol.    See ROS for additional symptoms and/or pertinent negatives.       History obtained from the patient.    Past Medical History:   Diagnosis Date   ? Anxiety    ? Biliary colic    ? Diverticulitis    ? Gallstones    ? Irritable bowel syndrome    ? Irritable bowel syndrome with diarrhea    ? Kidney stone     at age 42   ? 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 PONV (postoperative nausea and vomiting)        Active Ambulatory (Non-Hospital) Problems    Diagnosis   ? High urine sodium   ? Hypercalciuria   ? Low urine output   ? Calculus of ureter   ? Irritable bowel syndrome with diarrhea   ? History of biliary colic   ? History of kidney stones   ? Diverticulitis of large intestine without perforation or abscess without bleeding   ? Generalized Anxiety Disorder   ? Pigmented Purpuric Lichenoid Dermatitis   ? [employer]nec[employer] Intrauterine Device (IUD)       Family History   Problem Relation Age of Onset   ? Cancer Mother         skin   ? Pacemaker Mother         4 open heart surgeries    ? Diverticulitis Father    ? Breast cancer Other 40        great aunt   ? Urolithiasis Brother         recurrent   ? Gout Neg Hx        Social History     Tobacco Use   ? Smoking status: Never Smoker   ? Smokeless tobacco: Never Used   Substance Use Topics   ? Alcohol use: Yes     Comment: social only       Review of Systems   Respiratory: Positive for  shortness of breath.    Cardiovascular: Positive for palpitations.   Neurological: Positive for dizziness.   Psychiatric/Behavioral: The patient is not nervous/anxious.    All other systems reviewed and are negative.      OBJECTIVE    Vitals:    02/10/20 1829   BP: 119/81   Patient Site: Right Arm   Patient Position: Sitting   Cuff Size: Adult Large   Pulse: 69   Resp: 16   Temp: 97.6  F (36.4  C)   TempSrc: Oral   SpO2: 98%   Weight: 213 lb (96.6 kg)       Physical Exam  Constitutional:       Appearance: She is well-developed.   HENT:      Right Ear: External ear normal.      Left Ear: External ear normal.   Cardiovascular:      Rate and Rhythm: Normal rate and regular rhythm.      Heart sounds: Normal heart sounds. No murmur.   Pulmonary:      Effort: Pulmonary effort is normal. No respiratory distress.      Breath sounds: Normal breath sounds. No wheezing or rales.   Chest:      Chest wall: No tenderness.   Lymphadenopathy:      Cervical: No cervical adenopathy.   Skin:     General: Skin is warm and dry.      Findings: No rash.   Neurological:      Mental Status: She is alert and oriented to person, place, and time.   Psychiatric:         Mood and Affect: Mood normal.         Behavior: Behavior normal.         Thought Content: Thought content normal.         Judgment: Judgment normal.         Labs:  Recent Results (from the past 240 hour(s))   Electrocardiogram Perform - Clinic   Result Value Ref Range    SYSTOLIC BLOOD PRESSURE      DIASTOLIC BLOOD PRESSURE      VENTRICULAR RATE 62 BPM    ATRIAL RATE 62 BPM    P-R INTERVAL 168 ms    QRS DURATION 86 ms    Q-T INTERVAL 418 ms    QTC CALCULATION (BEZET) 424 ms    P Axis -4 degrees    R AXIS 9 degrees    T AXIS 14 degrees    MUSE DIAGNOSIS       Normal sinus rhythm  Normal ECG  No previous ECGs available           Radiology:    No results found.    PATIENT INSTRUCTIONS:   Patient Instructions   EKG looks good.      Recommend sending a [employer] message to your primary  care provider about symptoms and perhaps they can set up a Holter Monitor without another visit.      Avoid caffeine, alcohol as much as possible.    Do not be afraid of the ER with any sort of fast heart rate with dizziness, shortness of breath, especially if longer than 30 minutes-1 hour.  You should be seen for any heart rates persistently over 130-140.        Patient Education     Understanding Heart Palpitations    Heart palpitations are the feeling you have when your heartbeat seems to be racing, pounding, skipping, or fluttering. Heart palpitations are most often felt in the chest. Sometimes, they may also be felt in the neck.   What causes heart palpitations?  In most cases, heart palpitations are caused by:    Stress or anxiety    Exercise    Pregnancy    Some medicines    Caffeine    Nicotine    Alcohol    Illegal drugs, such as cocaine    Health problems, such as anemia or overactive thyroid  Many heart palpitations are harmless. But in some cases, palpitations may be caused by a problem with the heart such as an abnormal heart rhythm (arrhythmia). They may need to be managed by you and your healthcare provider or treated right away.  How are heart palpitations treated?  Treatments for heart palpitations depend on the cause. Options may include:    Managing the things that trigger your heart palpitations. This could mean:  ? Learning ways to reduce stress and anxiety  ? Staying away from caffeine, nicotine, alcohol, and illegal drugs  ? Stopping the use of certain medicines, under your doctors guidance    Medicines, procedures, or surgery to treat an arrhythmia or other health problem that is causing your symptoms  What are possible complications of heart palpitations?  Complications of heart palpitations are rare unless they are caused by a problem such as an arrhythmia. In such cases, complications can include:    Fainting    Heart failure. This problem occurs when the heart is so weak it no longer  pumps blood well.    Blood clots and stroke    Sudden cardiac arrest. This problem occurs when the heart suddenly stops beating.  When should I call my healthcare provider?  Call your healthcare provider right away if you have any of these:    Palpitations that prevent you from sleeping or otherwise affect your quality of life.    Symptoms that dont get better with treatment, or symptoms that get worse    New symptoms, such as chest pain, shortness of breath, dizziness, or fainting  Date Last Reviewed: 5/1/2016 2000-2019 [employer]. [address] [place] [id number]. All rights reserved. This information is not intended as a substitute for professional medical care. Always follow your healthcare professional's instructions.

## 2021-06-29 ENCOUNTER — HOSPITAL ENCOUNTER (OUTPATIENT)
Dept: CARDIOLOGY | Facility: HOSPITAL | Age: 56
Discharge: HOME | End: 2021-06-29
Attending: INTERNAL MEDICINE
Payer: COMMERCIAL

## 2021-06-29 ENCOUNTER — COMMUNICATION - HEALTHEAST (OUTPATIENT)
Dept: CARDIOLOGY | Facility: CLINIC | Age: 56
End: 2021-06-29

## 2021-06-29 NOTE — PROGRESS NOTES
Progress Notes by Florecita Cox CNP at 10/19/2020  3:30 PM     Author: Florecita Cox CNP Service: -- Author Type: Nurse Practitioner    Filed: 10/19/2020  5:18 PM Encounter Date: 10/19/2020 Status: Signed    : Florecita Cox CNP (Nurse Practitioner)             Assessment/Recommendations   Assessment/Plan:    Paroxysmal atrial fibrillation: She continues to have frequent, but brief episodes of A. fib. We reviewed the physiology and natural progression of atrial fibrillation and treatment options of medications or pulmonary vein isolation ablation.   She would be a good candidate for PVI as first-line therapy.  She is not quite ready to undergo ablation.  Her blood pressure will not tolerate an increase in metoprolol.  Discontinue metoprolol and start diltiazem 120 mg daily.  She was instructed to keep a log of AF episodes and to call if they increase in frequency or duration.  Recommend utilizing flecainide as first-line AAD, but would need stress test prior to initiation.    She was reassured that atrial fibrillation is not life-threatening, but carries an increased risk for stroke.  She has a [id number] score of 1 for female gender; per current guidelines anticoagulation not indicated.  Continue aspirin 81 mg daily for stroke prophylaxis.    Follow up in 6 months     History of Present Illness/Subjective    Ms. Tania Kaufman is a 54 y.o. female who comes in today for EP follow-up of atrial fibrillation.  She has a history of atrial fibrillation anxiety, diverticulitis, IBS, and recurrent kidney stones.  She was diagnosed with atrial fibrillation in February 2020, though began having palpitations in the summer 2019.  Symptoms consist of tachypalpitations, dyspnea on exertion, and lightheadedness.  Episodes were occurring every 1 to 2 months, but have been increasing in frequency and duration, occurring almost daily and lasting up to 6 hours.   Echo shows normal structure and function.  Event monitor  "documented atrial fibrillation with rapid ventricular response.   She was started on low-dose metoprolol succinate and Eliquis on 2/21/2020 with subsequent decrease in AF frequency and symptom severity.  She remains on low-dose daily aspirin for stroke prophylaxis.    Eden states that she has been feeling well.  She continues to have \"sporadic and random\" episodes of A. fib occurring up to 5 times per week.  Most episodes are short, the longest lasting for 3 hours, though that occurred months ago.  She denies chest discomfort, abdominal fullness/bloating or peripheral edema, shortness of breath, paroxysmal nocturnal dyspnea, orthopnea, lightheadedness, dizziness, pre-syncope, or syncope.      Cardiographics (EKG personally reviewed):  EKG done 2/10/2020 shows sinus rhythm at 62 bpm, QT/QTc interval measures 418/424 ms    Event monitor worn for 1 week beginning on 2/17/2020 shows paroxysmal atrial fibrillation with rapid ventricular response 105-160 bpm.  Symptoms noted for all episodes of A. fib.  AF burden approximately 6%.  No significant bradycardia or pauses.  No significant ventricular arrhythmia.    ECHO done 2/24/2020:    Left ventricle ejection fraction is normal. The calculated left ventricular ejection fraction is 58%.    The right ventricle is mildly dilated. The systolic function is mildly reduced.    No hemodynamically significant valvular heart abnormalities.    No previous study for comparison.       Physical Examination Review of Systems   Vitals:    10/19/20 1530   BP: 100/60   Pulse: 68   Resp: 16     Body mass index is 36.08 kg/m .  Wt Readings from Last 3 Encounters:   10/19/20 216 lb 12.8 oz (98.3 kg)   03/05/20 212 lb (96.2 kg)   02/24/20 215 lb (97.5 kg)     General Appearance:   Alert, well-appearing and in no acute distress.   HEENT: Atraumatic, normocephalic.  No scleral icterus, normal conjunctivae, EOMs intact, PERRL.    Chest/Lungs:   Chest symmetric, spine straight.  Respirations " unlabored.  Lungs are clear to auscultation.   Cardiovascular:   Regular rate and rhythm.  Normal first and second heart sounds with no murmurs, rubs, or gallops; radial and posterior tibial pulses are intact, no edema.   Abdomen:  Soft, nondistended, bowel sounds present.   Extremities: No cyanosis or clubbing.   Musculoskeletal: Moves all extremities.  Gait steady.   Skin: Warm, dry, intact.    Neurologic: Mood and affect are appropriate.  Alert and oriented to person, place, time, and situation.    General: WNL  Eyes: WNL  Ears/Nose/Throat: WNL  Lungs: WNL  Heart: Irregular Heartbeat  Stomach: WNL  Bladder: WNL  Muscle/Joints: WNL  Skin: WNL  Nervous System: WNL  Mental Health: WNL     Blood: WNL       Medical History  Surgical History Family History Social History   Past Medical History:   Diagnosis Date   ? Anxiety    ? Biliary colic    ? Diverticulitis    ? Gallstones    ? Irritable bowel syndrome    ? Irritable bowel syndrome with diarrhea    ? Kidney stone     at age 42   ? 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 PONV (postoperative nausea and vomiting)     Past Surgical History:   Procedure Laterality Date   ? NO PAST SURGERIES     ? [place] CYSTO/URETERO W/LITHOTRIPSY &INDWELL STENT INSRT Right 6/11/2019    Procedure: CYSTOSCOPY, RIGHT RETROGRADE,  RIGHT URETEROSCOPY WITH LASER LITHOTRIPSY AND STENT INSERTION;  Surgeon: John Gutierrez MD;  Location: [facility];  Service: Urology   ? wisdom teeth removal      Family History   Problem Relation Age of Onset   ? Cancer Mother         skin   ? Pacemaker Mother         4 open heart surgeries    ? Diverticulitis Father    ? Breast cancer Other 40        great aunt   ? Urolithiasis Brother         recurrent   ? Gout Neg Hx     Social History     Tobacco Use   ? Smoking status: Never Smoker   ? Smokeless tobacco: Never Used   Substance Use Topics   ? Alcohol use: Yes     Comment: social only   ? Drug use: No          Medications   Allergies   Current Outpatient Medications   Medication Sig Dispense Refill   ? aspirin 81 MG EC tablet Take 1 tablet (81 mg total) by mouth daily.  0   ? co-enzyme Q-10 30 mg capsule Take 30 mg by mouth daily.     ? GLUCOSAMINE SULFATE (GLUCOSAMINE ORAL) Take by mouth. prn     ? L.ACID/L.CASEI/B.BIF/B.LUKE/FOS (PROBIOTIC BLEND ORAL) Take by mouth.     ? melatonin 3 mg Tab tablet Take 3 mg by mouth bedtime as needed.     ? multivitamin therapeutic (THERAGRAN) tablet Take 1 tablet by mouth daily.     ? NON FORMULARY            ? NON FORMULARY Phytomega   One tab daily     ? diltiazem (CARDIZEM CD) 120 MG 24 hr capsule Take 1 capsule (120 mg total) by mouth daily. 30 capsule 11   ? valACYclovir (VALTREX) 1000 MG tablet Three times per day for 7 days ( shingles) 21 tablet 3     No current facility-administered medications for this visit.     No Known Allergies      Lab Results    Chemistry/lipid CBC Cardiac Enzymes/BNP/TSH/INR   Lab Results   Component Value Date    CREATININE 0.88 02/20/2020    BUN 11 02/20/2020     02/20/2020    K 4.3 02/20/2020     02/20/2020    CO2 25 02/20/2020     Creatinine (mg/dL)   Date Value   02/20/2020 0.88   07/03/2019 1.01   06/03/2019 0.77   12/05/2016 0.79      Lab Results   Component Value Date    WBC 4.7 02/20/2020    HGB 13.1 02/20/2020    HCT 40.2 02/20/2020    MCV 91 02/20/2020     02/20/2020    Lab Results   Component Value Date    TSH 4.52 02/20/2020      45 minutes were spent face to face in this visit with more than 50% spent discussing diagnoses as listed above, counseling, and coordination of care.    This note has been dictated using voice recognition software. Any grammatical, typographical, or context distortions are unintentional and inherent to the software.

## 2021-06-30 ENCOUNTER — COMMUNICATION - HEALTHEAST (OUTPATIENT)
Dept: CARDIOLOGY | Facility: CLINIC | Age: 56
End: 2021-06-30

## 2021-06-30 NOTE — PROGRESS NOTES
"Progress Notes by Florecita Cox CNP at 2/19/2021  2:10 PM     Author: Florecita Cox CNP Service: -- Author Type: Nurse Practitioner    Filed: 2/19/2021  2:45 PM Encounter Date: 2/19/2021 Status: Signed    : Florecita Cox CNP (Nurse Practitioner)           The patient has been notified of following:     \"This video visit will be conducted via a call between you and your physician/provider. We have found that certain health care needs can be provided without the need for an in-person physical exam.  This service lets us provide the care you need with a video conversation.  If a prescription is necessary we can send it directly to your pharmacy.  If lab work is needed we can place an order for that and you can then stop by our lab to have the test done at a later time.      Patient has given verbal consent to a Video visit? Yes    HEART CARE VIDEO ENCOUNTER        The patient has chosen to have the visit conducted as a video visit, to reduce risk of exposure given the current status of Coronavirus in our community. This video visit is being conducted via a call between the patient and physician/provider. Health care needs are being provided without a physical exam.     Assessment/Recommendations   Assessment/Plan:  Paroxysmal atrial fibrillation: She continues to have some breakthrough since increasing the flecainide, but not as much.  She is also increased her daily water intake which may be helping. We reviewed the physiology and natural progression of atrial fibrillation and treatment options of medications or pulmonary vein isolation ablation.  We further discussed PVI including the procedure, risks, and expected recovery. She is a good candidate for ablation, and is now ready to proceed with scheduling.  Continue flecainide to 100 mg twice daily and diltiazem 120 mg daily.  Take an extra 50 mg of flecainide at onset of A. fib.     She was reassured that atrial fibrillation is not life-threatening, but " carries an increased risk for stroke.  She has a [id number] score of 1 for female gender; per current guidelines anticoagulation not indicated.  Continue aspirin 81 mg daily for stroke prophylaxis.  We discussed the need  for short-term oral anticoagulation before and after ablation.    Follow Up Plan:  PVI clinic  I have reviewed the note as documented.  This accurately captures the substance of my conversation with the patient.    Total time of video between patient and provider was 20 minutes   Start Time: 1414   Stop Time: 1434    Originating Location (pt. Location): Home    Distant Location (provider location):  [facility]     Mode of Communication:  Video Conference via Web[employer]       History of Present Illness/Subjective    Tania Kaufman is a 55 y.o. female who is being evaluated via a billable video visit and has consented to a video visit.   She has a history of atrial fibrillation, anxiety, diverticulitis, IBS, and recurrent kidney stones.  She was diagnosed with atrial fibrillation in February 2020, though began having palpitations in the summer 2019.  Symptoms consist of tachypalpitations, dyspnea on exertion, and lightheadedness.  Episodes were occurring every 1 to 2 months, but increased in frequency and duration, occurring almost daily and lasting up to 6 hours.   Echo shows normal structure and function.  Event monitor documented atrial fibrillation with rapid ventricular response.   She was started on low-dose metoprolol succinate and Eliquis on 2/21/2020 with subsequent decrease in AF frequency and symptom severity, but with hypotension.  Metoprolol was switched to diltiazem.   She again had an increase in AF frequency.  Nuclear stress test was negative for ischemia.  She was started on low-dose flecainide, but continued to have frequent episodes of A. fib, so flecainide was increased to 100 mg twice daily. She remains on low-dose daily aspirin for stroke  proRoseanne Harmon states that last week she had frequent episodes of A. fib, though has not had any this week and now feels great.  She has noted some mild intermittent dizziness since the increase in flecainide dose.  She denies chest discomfort, abdominal fullness/bloating or peripheral edema, shortness of breath, paroxysmal nocturnal dyspnea, orthopnea, lightheadedness, pre-syncope, or syncope.       Cardiographics (EKG personally reviewed):  EKG done 2/10/2020 shows sinus rhythm at 62 bpm, QT/QTc interval measures 418/424 ms     Event monitor worn for 1 week beginning on 2/17/2020 shows paroxysmal atrial fibrillation with rapid ventricular response 105-160 bpm.  Symptoms noted for all episodes of A. fib.  AF burden approximately 6%.  No significant bradycardia or pauses.  No significant ventricular arrhythmia.     ECHO done 2/24/2020:    Left ventricle ejection fraction is normal. The calculated left ventricular ejection fraction is 58%.    The right ventricle is mildly dilated. The systolic function is mildly reduced.    No hemodynamically significant valvular heart abnormalities.    No previous study for comparison.    NM stress test done December 21, 2020:  ?  There is no prior study for comparison.  ?  The nuclear stress test is negative for inducible myocardial ischemia or infarction.  ?  The patient is at a low risk of future cardiac ischemic events.  ?  The left ventricular ejection fraction at rest is greater than 70%.  ?  The patient's exercise capacity is average.  No chest pain with activity.  ?  Left ventricular function is normal.  ?  Stress electrocardiogram was negative for inducible ischemia.    I have reviewed and updated the patient's Past Medical History, Social History, Family History and Medication List.     Physical Examination performed via live video encounter Review of Systems   General Appearance:    Well appearing, in no distress, normal body habitus, upright.   ENT/Mouth: membranes  moist, no nasal discharge or bleeding gums.  Normal head shape, no evidence of injury or laceration.     EYES:  no scleral icterus, normal conjunctivae   Neck: no evidence of thyromegaly.  Supple   Chest/Lungs:   No audible wheezing equal chest wall expansion. Non labored breathing.  No cough.   Cardiovascular:   No evidence of elevated jugular venous pressure.  No evidence of pitting edema bilaterally    Abdomen:  no evidence of abdominal distention. No observe juandice.     Extremities: no cyanosis or clubbing noted.    Skin: no xanthelasma, normal skin color. No evidence of facial lacerations.      Neurologic: Normal arm motion bilateral, no tremors.  No evidence of focal defect.       Psychiatric: alert and oriented x3, calm, mood and affect appropriate.              See CMA note attached, personally reviewed.                                Medical History  Surgical History Family History Social History   Past Medical History:   Diagnosis Date   ? Anxiety    ? Biliary colic    ? Diverticulitis    ? Gallstones    ? Irritable bowel syndrome    ? Irritable bowel syndrome with diarrhea    ? Kidney stone     at age 42   ? 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 PONV (postoperative nausea and vomiting)     Past Surgical History:   Procedure Laterality Date   ? NO PAST SURGERIES     ? [place] CYSTO/URETERO W/LITHOTRIPSY &INDWELL STENT INSRT Right 6/11/2019    Procedure: CYSTOSCOPY, RIGHT RETROGRADE,  RIGHT URETEROSCOPY WITH LASER LITHOTRIPSY AND STENT INSERTION;  Surgeon: John Gutierrez MD;  Location: [facility];  Service: Urology   ? wisdom teeth removal      Family History   Problem Relation Age of Onset   ? Cancer Mother         skin   ? Pacemaker Mother         4 open heart surgeries    ? Diverticulitis Father    ? Breast cancer Other 40        great aunt   ? Urolithiasis Brother         recurrent   ? Gout Neg Hx       Social History     Socioeconomic History   ? Marital status:       Spouse name: Not on file   ? Number of children: Not on file   ? Years of education: Not on file   ? Highest education level: Not on file   Occupational History   ? Occupation:     Social Needs   ? Financial resource strain: Not on file   ? Food insecurity     Worry: Not on file     Inability: Not on file   ? Transportation needs     Medical: Not on file     Non-medical: Not on file   Tobacco Use   ? Smoking status: Never Smoker   ? Smokeless tobacco: Never Used   Substance and Sexual Activity   ? Alcohol use: Yes     Comment: social only   ? Drug use: No   ? Sexual activity: Not on file   Lifestyle   ? Physical activity     Days per week: Not on file     Minutes per session: Not on file   ? Stress: Not on file   Relationships   ? Social connections     Talks on phone: Not on file     Gets together: Not on file     Attends [employer] service: Not on file     Active member of club or organization: Not on file     Attends meetings of clubs or organizations: Not on file     Relationship status: Not on file   ? Intimate partner violence     Fear of current or ex partner: Not on file     Emotionally abused: Not on file     Physically abused: Not on file     Forced sexual activity: Not on file   Other Topics Concern   ? Not on file   Social History Narrative   ? Not on file          Medications  Allergies   Current Outpatient Medications   Medication Sig Dispense Refill   ? aspirin 81 MG EC tablet Take 1 tablet (81 mg total) by mouth daily.  0   ? co-enzyme Q-10 30 mg capsule Take 30 mg by mouth daily.     ? diltiazem (CARDIZEM CD) 120 MG 24 hr capsule Take 1 capsule (120 mg total) by mouth daily. 90 capsule 3   ? flecainide (TAMBOCOR) 100 MG tablet Take 1 tablet (100 mg total) by mouth 2 (two) times a day. 180 tablet 3   ? GLUCOSAMINE SULFATE (GLUCOSAMINE ORAL) Take by mouth. prn     ? L.ACID/L.CASEI/B.BIF/B.LUKE/FOS (PROBIOTIC BLEND ORAL) Take by mouth.     ? melatonin 3 mg Tab tablet Take 3 mg  by mouth bedtime as needed.     ? multivitamin therapeutic (THERAGRAN) tablet Take 1 tablet by mouth daily.     ? NON FORMULARY              No current facility-administered medications for this visit.     No Known Allergies     H/o severe postanesthesia nausea and vomiting      Lab Results    Chemistry/lipid CBC Cardiac Enzymes/BNP/TSH/INR   Lab Results   Component Value Date    CHOL 178 09/11/2014    HDL 75 09/11/2014    LDLCALC 87 09/11/2014    TRIG 82 09/11/2014    CREATININE 0.88 02/20/2020    BUN 11 02/20/2020    K 4.3 02/20/2020     02/20/2020     02/20/2020    CO2 25 02/20/2020    Lab Results   Component Value Date    WBC 4.7 02/20/2020    HGB 13.1 02/20/2020    HCT 40.2 02/20/2020    MCV 91 02/20/2020     02/20/2020    Lab Results   Component Value Date    TSH 4.52 02/20/2020            Florecita Cox CNP  Cardiac Electrophysiology

## 2021-07-03 NOTE — ADDENDUM NOTE
Addendum Note by Lashonda Arango MD at 2/13/2020 10:18 AM     Author: Lashonda Arango MD Service: -- Author Type: Physician    Filed: 2/13/2020 10:18 AM Encounter Date: 2/11/2020 Status: Signed    : Lashonda Arango MD (Physician)    Addended by: LASHONDA ARANGO on: 2/13/2020 10:18 AM        Modules accepted: Orders

## 2021-07-03 NOTE — ADDENDUM NOTE
Addendum Note by Lashonda Arango MD at 2/21/2020 11:44 AM     Author: Lashonda Arango MD Service: -- Author Type: Physician    Filed: 2/21/2020 11:44 AM Encounter Date: 2/11/2020 Status: Signed    : Lashonda Arango MD (Physician)    Addended by: LAHSONDA ARANGO on: 2/21/2020 11:44 AM        Modules accepted: Orders

## 2021-07-04 NOTE — TELEPHONE ENCOUNTER
Telephone Encounter by Nancy Arreola RN at 6/30/2021  8:36 AM     Author: Nancy Arreola RN Service: -- Author Type: Registered Nurse    Filed: 6/30/2021  8:37 AM Encounter Date: 6/30/2021 Status: Signed    : Nancy Arreola RN (Registered Nurse)       Return call to patient, reviewed ekg results with her and explained abnormal findings noted on her previous ekg.  She states that she is back in SR and goes in and out of aflutter,she states that her episodes do not last too long and that it has been well controlled lately.  Answered all questions and she is eager to reschedule her PVI  in September.

## 2021-07-04 NOTE — PROGRESS NOTES
Progress Notes by Nate Reina MD at 6/22/2021  3:30 PM     Author: Nate Reina MD Service: -- Author Type: Physician    Filed: 6/22/2021  6:06 PM Encounter Date: 6/22/2021 Status: Signed    : Nate Reina MD (Physician)         [facility]  Cardiac Electrophysiology     Cardiac Electrophysiologist: Nate Reina    Atrial Fibrillation Ablation Clinic:       Ms. Tania Kaufman has documented occurrence of recurrent symptomatic paroxysmal atrial fibrillation and typical atrial flutter.  The patient has been educated regarding the underlying pathophysiology of this condition.  Treatment options including medical therapy and ablation strategies to treat his underlying arrhythmia have been discussed with the patient in detail.  The risks, benefits, and expected outcomes associated with ablation of his arrhythmia were discussed in detail.  The estimated a 80% likelihood of the patient being able to come off membrane active ventricular drug therapy following her ablation procedure.  Overall risk of a major complication was estimated <1-2%.  The patient's questions were answered and he states that she is looking forward to his ablation procedure early next month.    Please see the detailed H&P performed today by Radha Duron NP.

## 2021-07-04 NOTE — LETTER
Letter by Nancy Arreola RN at      Author: Nancy Arreola RN Service: -- Author Type: --    Filed:  Encounter Date: 6/22/2021 Status: (Other)       IMPORTANT INFORMATION REGARDING YOUR      PULMONARY VEIN ISOLATION ABLATION     PARRISH (Transesophageal Echocardiogram) :  We will call you to schedule      Used to evaluate the Left Atrium for presence of a possible blood clot prior to your ablation.    Have nothing to eat or drink for 6 hours prior to the procedure.    You may take your medications with a small sip of water with the exception of multivitamins and/or minerals the morning before your test.    You will need someone to drive you home due to the sedation medication used during the test.    Pulmonary Vein Isolation Ablation : Thursday 7-1-21      Please arrive at 0600 for registration and preporation for your procedure.    Your procedure is approximately scheduled for 0800.    Have nothing to eat or drink after midnight the night prior to your ablation.    Please DO NOT take any medications EXCEPT your Eliquis with a small sip of water the morning of your ablation.    If you use a CPAP, please bring this with you to the hospital.    The hospital staff have asked that you arrange for your family/health  to be available to pick you up around 6:00pm the day of your procedure if you are able to be discharged that same day, or if you are required to spend the night in the hospital at 9:00am the following morning.    Medication Changes :      Please start Famotidine 20 mg twice daily.  This is to help protect your esophagus from possible irritation during and after the ablation.  We will need you to contiue this for 4 weeks after the procedure.    Your last dose of  Flecainide will be on Sunday 6-27-21, your evening dose.    It is important to remain on your anticoagulation medication (Eliquis) uninterrupted before and after your ablation, PLEASE DO NOT STOP THIS MEDICATION or skip any doses. This could  impact proceeding with your procedure on the scheduled date.      Postoperative Information :      Please plan on taking 5-7 days after the procedure for recuperation.     We ask that you do not drive until you are seen in the clinic for your post op follow up.  This is to aide in the healing of your groin sites.  This is scheduled on Monday 7-5-21, 1:15 at the [facility].    Detailed information regarding discharge instructions will be given to you when you leave the hospital.            Parking information :    [facility]    ADDRESS  [address] [place] [address]    [facility] is the flagship location for [facility], and is comprised of two hospitals and dozens of adult specialty clinics on both the East and West rios of the [facility] in [place].    This location offers the widest range of hospital and clinical services provided through [facility]. Complete services range from primary care, emergency care and the delivery of thousands of babies each year, to care of patients with the most complex conditions. Areas of specialization include solid organ and blood and marrow transplantation, heart disease and cancer.    DIRECTIONS AND PARKING  Both self-park and  parking are available at the [facility] building. Self park and  rates are the same. See the map for  locations.    Tips are not accepted.    Our ambassadors will be available at the main clinic entrance on the [address] to help you find your clinic.    If you choose to park your car yourself:    The parking ramps listed on the map in this packet offer reduced parking rates with a validated ticket - refer to chart below. Remember to bring your ticket to the clinic for validation. If you lose your ticket, please ask your clinic staff to provide you with some  verification.    When no attendant is on duty, please pay at the Exit Payment Station located at the exit. Weekly and monthly parking passes are available for patients and their visitors through the Parking Office information line at [phone] ([phone]).    A- [facility]  [address] [place] [address]    B- Patient/Visitor Parking Ramp [place]  [address] [place] [address]          For more information or to access this on [facility] web site please visit:  [email or web address]              Contact Information :       Please call Dr. Reina's nursing team with any questions or concerns regarding the procedure at :  [phone].    Scheduling questions or concerns: Grisel Christie at  [phone].                        Thank you for choosing [facility].

## 2021-07-04 NOTE — TELEPHONE ENCOUNTER
Telephone Encounter by Nancy Arreola RN at 6/30/2021  8:36 AM     Author: Nancy Arreola RN Service: -- Author Type: Registered Nurse    Filed: 6/30/2021  8:36 AM Encounter Date: 6/30/2021 Status: Signed    : Nancy Arreola RN (Registered Nurse)       ----- Message from Nate Reina MD sent at 6/30/2021  7:40 AM CDT -----  Hi team,The patient wrote me a note which I responded to regarding a recent EKG which demonstrated atrial flutter.I do not see a follow-up study demonstrating return to sinus rhythm.  Can 1 of you please reach out to her and confirm her current rhythm status and we should probably get an EKG as well.Thanks,   Nate

## 2021-07-06 VITALS
HEART RATE: 68 BPM | DIASTOLIC BLOOD PRESSURE: 64 MMHG | SYSTOLIC BLOOD PRESSURE: 104 MMHG | RESPIRATION RATE: 16 BRPM | HEIGHT: 65 IN | WEIGHT: 215.4 LBS | BODY MASS INDEX: 35.89 KG/M2

## 2021-07-07 NOTE — TELEPHONE ENCOUNTER
Pt was called, corresponding information/recommendations reviewed, verbalized understanding, has no further questions at this time, contact information was given for further concerns/questions and medication list updated   6/28/2021 12:43 PM  Denise Granados RN

## 2021-07-07 NOTE — TELEPHONE ENCOUNTER
Telephone Encounter by Denise Granados RN at 6/28/2021 12:42 PM     Author: Denise Granados RN Service: -- Author Type: Registered Nurse    Filed: 6/28/2021 12:42 PM Encounter Date: 6/28/2021 Status: Signed    : Denise Granados RN (Registered Nurse)       Radha Albarran CNP Westlund, Jessica T, RN   Caller: Unspecified (Today, 10:14 AM)             Brianna,     I think I understand where the med changes came from.     Florecita explained it to me.     Have her increase her flecainide to 150mg two times a day to see if it will control the a fib more.     Yes continue the diltiazem at 120mg oral daily     Yes continue the aspirin 81mg oral daily.     Let her know we continue to highly recommend the ablation.     Follow up can be sooner if she has a lot of a fib.     Radha Albarran CNP

## 2021-07-07 NOTE — TELEPHONE ENCOUNTER
Follow up requested so patient is monitored after cancelling upcoming ablation.  Attempted to contact pt, detailed message discussing the below with contact information was left per pt request.  6/29/2021 9:26 AM  Denise Granados RN

## 2021-07-07 NOTE — TELEPHONE ENCOUNTER
----- Message from Marissa Romero sent at 6/29/2021  8:52 AM CDT -----  Regarding: Avis Oropeza  General phone call:    Caller: Eden  Primary cardiologist: Avis  Detailed reason for call: Patient want to know why she is to fup with Estrella in August.  She thought her ablation would be the next thing.    Best phone number: [phone]  Best time to contact: any  Ok to leave a detailed message? yes  Device? no    Additional Info:

## 2021-07-07 NOTE — TELEPHONE ENCOUNTER
Pc from patient to scheduling, cancelling her upcoming PVI.  Pc to patient.  She was instructed to go back to 81mg ASA, discontinue eliquis.  She was also instructed to go back to previous medications of diltiazem 120mg daily and flecainide 100mg two times a day.    Radha,     See above note, patient instructed to go back to medications prior to scheduled PVI.  Do you recommend other changes?  Patient to follow up with you in 2 months to check in.    Let me know if you need anything further.  Patient only needs to be called with changes.    Brianna

## 2021-07-21 ENCOUNTER — RECORDS - HEALTHEAST (OUTPATIENT)
Dept: ADMINISTRATIVE | Facility: CLINIC | Age: 56
End: 2021-07-21

## 2021-07-22 ENCOUNTER — RECORDS - HEALTHEAST (OUTPATIENT)
Dept: FAMILY MEDICINE | Facility: CLINIC | Age: 56
End: 2021-07-22

## 2021-07-22 DIAGNOSIS — Z00.00 ROUTINE GENERAL MEDICAL EXAMINATION AT A HEALTH CARE FACILITY: ICD-10-CM

## 2021-07-29 DIAGNOSIS — I48.0 PAROXYSMAL ATRIAL FIBRILLATION (H): ICD-10-CM

## 2021-07-29 RX ORDER — FLECAINIDE ACETATE 150 MG/1
150 TABLET ORAL 2 TIMES DAILY
Qty: 180 TABLET | Refills: 1 | Status: SHIPPED | OUTPATIENT
Start: 2021-07-29 | End: 2021-10-06

## 2021-07-29 RX ORDER — DILTIAZEM HYDROCHLORIDE 120 MG/1
120 CAPSULE, COATED, EXTENDED RELEASE ORAL DAILY
Qty: 90 CAPSULE | Refills: 3 | Status: SHIPPED | OUTPATIENT
Start: 2021-07-29 | End: 2021-10-06

## 2021-08-12 ENCOUNTER — MYC MEDICAL ADVICE (OUTPATIENT)
Dept: FAMILY MEDICINE | Facility: CLINIC | Age: 56
End: 2021-08-12

## 2021-08-12 DIAGNOSIS — N88.9 ABNORMAL CERVIX FINDING: Primary | ICD-10-CM

## 2021-08-12 NOTE — TELEPHONE ENCOUNTER
"Will place ob/gyn referral. Attempted to call the number there and it asked for \"a nine digit tax ID number\" asa provider which I'm not sure what that is referring to?    Can specialty scheduling please try to help with next steps here to get referral through to her insurance?  Thanks,   Dr. Arango    "

## 2021-08-16 NOTE — TELEPHONE ENCOUNTER
8-16-21  The 9 digit tax id# is the [place] tax id# of [id number].  I called patients [employer] @ [phone] tt Jose Garcia advised pt doesn't need referral as Tatum dumont is on the bypass list and no referral needed, call ref# [phone].    Nothing more needs to be done on this   radha

## 2021-08-16 NOTE — TELEPHONE ENCOUNTER
Please relay Kristen's message to pt; sounds like nothing further is needed at this point after she talked to the insurance.  Thanks,   Dr. Arango

## 2021-10-06 ENCOUNTER — OFFICE VISIT (OUTPATIENT)
Dept: CARDIOLOGY | Facility: CLINIC | Age: 56
End: 2021-10-06
Payer: COMMERCIAL

## 2021-10-06 VITALS
BODY MASS INDEX: 35.12 KG/M2 | SYSTOLIC BLOOD PRESSURE: 100 MMHG | HEART RATE: 88 BPM | WEIGHT: 210.8 LBS | HEIGHT: 65 IN | DIASTOLIC BLOOD PRESSURE: 60 MMHG | RESPIRATION RATE: 12 BRPM

## 2021-10-06 DIAGNOSIS — I48.92 ATRIAL FLUTTER, UNSPECIFIED TYPE (H): Primary | ICD-10-CM

## 2021-10-06 DIAGNOSIS — I48.0 PAROXYSMAL ATRIAL FIBRILLATION (H): ICD-10-CM

## 2021-10-06 PROCEDURE — 99214 OFFICE O/P EST MOD 30 MIN: CPT | Performed by: NURSE PRACTITIONER

## 2021-10-06 PROCEDURE — 93000 ELECTROCARDIOGRAM COMPLETE: CPT | Performed by: INTERNAL MEDICINE

## 2021-10-06 RX ORDER — DILTIAZEM HYDROCHLORIDE 120 MG/1
120 CAPSULE, COATED, EXTENDED RELEASE ORAL DAILY
Qty: 90 CAPSULE | Refills: 3 | Status: SHIPPED | OUTPATIENT
Start: 2021-10-06 | End: 2022-09-30

## 2021-10-06 RX ORDER — FLECAINIDE ACETATE 150 MG/1
150 TABLET ORAL 2 TIMES DAILY
Qty: 180 TABLET | Refills: 1 | Status: SHIPPED | OUTPATIENT
Start: 2021-10-06 | End: 2022-04-19

## 2021-10-06 ASSESSMENT — MIFFLIN-ST. JEOR: SCORE: 1562.06

## 2021-10-06 NOTE — LETTER
10/6/2021    Lashonda Arango MD  [address] [facility] [address]    RE: Tania Kaufman       Dear Colleague,    I had the pleasure of seeing Tania Kaufman in the [facility].    Progress Notes by Radha Albarran CNP at 6/22/2021  2:50 PM     Author: Radha Albarran CNP Service: -- Author Type: Nurse Practitioner    Filed: 6/22/2021  4:03 PM Encounter Date: 6/22/2021 Status: Signed    : Radha Albarran CNP (Nurse Practitioner)             Assessment/Recommendations   Assessment/Plan:      1.  Paroxysmal Atrial Fibrillation/presents today with persistent atrial flutter:  Initially diagnosed 2/2020 although reports likely starting the previous summer.  Symptoms consist of tachypalpitations, dyspnea on exertion, lightheadedness.  Failed antiarrhythmic therapy with flecainide, has also tried diltiazem and metoprolol.  Patient was recommended to have ablation, was seen by Dr. Reina, was scheduled for ablation-and canceled ablation.  She returns today with ongoing symptoms caused by her atrial flutter and atrial fibrillation.    -Reassured patient that she needs the ablation and that she has failed multiple different medication options.  Offered to change patient medications and have her go through cardioversion while waiting for ablation.  Patient did not want to do this.    -Patient has extreme medical anxiety because her mother has been through multiple cardiac problems throughout her entire life and she is fearful.  I reassured her echocardiogram historically had shown her ejection fraction to be 58% which is essentially normal.    -Due to scheduling constraints I have recommended patient see Dr. Moreau to see if she can get scheduled for an ablation sooner rather than Dr. Reina as scheduled.    2.  BELA: Patient reports multiple stressors in her life.     -I have discussed with patient if anxiety is a barrier to following  through with the ablation I am happy to offer her a few pills to treat anxiety the day before and the day of procedure if she feels this is necessary.    Follow-up with first available appointment with Dr. Moreau     History of Present Illness/Subjective    Ms. Tania Kaufman is a 55 y.o. female who comes in today for follow-up of atrial fibrillation and persistent atrial flutter    Eden reports experiencing atrial fibrillation and atrial flutter multiple times a day but can go up to 1 week without experiencing atrial fibrillation or flutter.  Her symptoms include dizziness, lightheadedness, difficulty to take a full breath while in a rapid heart rhythm, and throbbing sensation in her head.      She denies chest discomfort, palpitations, abdominal fullness/bloating or peripheral edema, shortness of breath, paroxysmal nocturnal dyspnea, orthopnea, dizziness, pre-syncope, or syncope.      Cardiographics (personally reviewed):  ECG done today, shows atrial flutter ventricular rate 94, QRS duration 112 ms, QT measures approximately 320 ms-machine unable to properly calculate due to flutter waves  ECHO done 2/24/2020Summary      Left ventricle ejection fraction is normal. The calculated left ventricular ejection fraction is 58%.    The right ventricle is mildly dilated. The systolic function is mildly reduced.    No hemodynamically significant valvular heart abnormalities.    No previous study for comparison.      :    NM stress test done 12/21/2020   There is no prior study for comparison.     The nuclear stress test is negative for inducible myocardial ischemia or infarction.     The patient is at a low risk of future cardiac ischemic events.     The left ventricular ejection fraction at rest is greater than 70%.     The patient's exercise capacity is average.  No chest pain with activity.     Left ventricular function is normal.     Stress electrocardiogram was negative for inducible ischemia.   :    I have reviewed  and updated the patient's Past Medical History, Social History, Family History and Medication List.     Physical Examination Review of Systems   There were no vitals filed for this visit.  There is no height or weight on file to calculate BMI.  Wt Readings from Last 3 Encounters:   10/19/20 216 lb 12.8 oz (98.3 kg)   03/05/20 212 lb (96.2 kg)   02/24/20 215 lb (97.5 kg)       General Appearance:   Alert, well-appearing and in no acute distress.   HEENT: Atraumatic, normocephalic.  No scleral icterus, normal conjunctivae, EOMs intact, PERRL.  Mucous membranes pink and moist.  No obvious thyromegaly.   Chest/Lungs:   Chest symmetric, spine straight.  Respirations unlabored.  Lungs are clear to auscultation.   Cardiovascular:   Iregular rate and rhythm.  Normal first and second heart sounds with no murmurs, rubs, or gallops; radial and posterior tibial pulses are intact, No edema.   Abdomen:  Soft, nontender, nondistended, bowel sounds present.   Extremities: No cyanosis or clubbing.   Musculoskeletal: Moves all extremities.  Gait steady.   Skin: Warm, dry, intact.    Neurologic: Mood and affect are appropriate.  Alert and oriented to person, place, time, and situation.                                                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      PONV (postoperative nausea and vomiting)     Past Surgical History:   Procedure Laterality Date     NO PAST SURGERIES       ME CYSTO/URETERO W/LITHOTRIPSY &INDWELL STENT INSRT Right 6/11/2019    Procedure: CYSTOSCOPY, RIGHT RETROGRADE,  RIGHT URETEROSCOPY WITH LASER LITHOTRIPSY AND STENT INSERTION;  Surgeon: John Gutierrez MD;  Location: [facility];   Service: Urology     wisdom teeth removal      Family History   Problem Relation Age of Onset     Cancer Mother         skin     Pacemaker Mother         4 open heart surgeries      Diverticulitis Father      Breast cancer Other 40        great aunt     Urolithiasis Brother         recurrent     Gout Neg Hx     Social History     Tobacco Use     Smoking status: Never Smoker     Smokeless tobacco: Never Used   Substance Use Topics     Alcohol use: Yes     Comment: social only     Drug use: No          Medications  Allergies   Current Outpatient Medications   Medication Sig Dispense Refill     apixaban ANTICOAGULANT (ELIQUIS) 5 mg Tab tablet Take 1 tablet (5 mg total) by mouth 2 (two) times a day. Please start 6-3-21, stop Aspirin when starting Eliquis 60 tablet 3     aspirin 81 MG EC tablet Take 1 tablet (81 mg total) by mouth daily.  0     co-enzyme Q-10 30 mg capsule Take 30 mg by mouth daily.       diltiazem (CARDIZEM CD) 120 MG 24 hr capsule Take 1 capsule (120 mg total) by mouth daily. 90 capsule 3     flecainide (TAMBOCOR) 100 MG tablet Take 1 tablet (100 mg total) by mouth 2 (two) times a day. 180 tablet 3     GLUCOSAMINE SULFATE (GLUCOSAMINE ORAL) Take by mouth. prn       L.ACID/L.CASEI/B.BIF/B.LUKE/FOS (PROBIOTIC BLEND ORAL) Take by mouth.       melatonin 3 mg Tab tablet Take 3 mg by mouth bedtime as needed.       multivitamin therapeutic (THERAGRAN) tablet Take 1 tablet by mouth daily.       NON FORMULARY              No current facility-administered medications for this visit.     No Known Allergies      Lab Results    Chemistry/lipid CBC Other   Lab Results   Component Value Date    CREATININE 0.88 02/20/2020    BUN 11 02/20/2020     02/20/2020    K 4.3 02/20/2020     02/20/2020    CO2 25 02/20/2020     Creatinine (mg/dL)   Date Value   02/20/2020 0.88   07/03/2019 1.01   06/03/2019 0.77   12/05/2016 0.79       Lab Results   Component Value Date    CHOL 178 09/11/2014    HDL 75 09/11/2014     Lab Results   Component Value Date    WBC 4.7 02/20/2020    HGB 13.1 02/20/2020    HCT 40.2 02/20/2020    MCV 91 02/20/2020     02/20/2020    Lab Results   Component Value Date    TSH 4.52 02/20/2020        This note has been dictated using voice recognition software. Any grammatical, typographical, or context distortions are unintentional and inherent to the software.    SHARDA Servin [facility]                                          Thank you for allowing me to participate in the care of your patient.      Sincerely,     TAMIA Servin CNP [facility]  cc:   Nate Reina MD  [address] [place] [address]

## 2021-10-07 LAB
ATRIAL RATE - MUSE: 94 BPM
DIASTOLIC BLOOD PRESSURE - MUSE: NORMAL MMHG
INTERPRETATION ECG - MUSE: NORMAL
P AXIS - MUSE: 98 DEGREES
PR INTERVAL - MUSE: 190 MS
QRS DURATION - MUSE: 112 MS
QT - MUSE: 510 MS
QTC - MUSE: 637 MS
R AXIS - MUSE: -14 DEGREES
SYSTOLIC BLOOD PRESSURE - MUSE: NORMAL MMHG
T AXIS - MUSE: 67 DEGREES
VENTRICULAR RATE- MUSE: 94 BPM

## 2021-10-07 NOTE — PROGRESS NOTES
Progress Notes by Radha Albarran CNP at 6/22/2021  2:50 PM     Author: Radha Albarran CNP Service: -- Author Type: Nurse Practitioner    Filed: 6/22/2021  4:03 PM Encounter Date: 6/22/2021 Status: Signed    : Radha Albarran CNP (Nurse Practitioner)             Assessment/Recommendations   Assessment/Plan:      1.  Paroxysmal Atrial Fibrillation/presents today with persistent atrial flutter:  Initially diagnosed 2/2020 although reports likely starting the previous summer.  Symptoms consist of tachypalpitations, dyspnea on exertion, lightheadedness.  Failed antiarrhythmic therapy with flecainide, has also tried diltiazem and metoprolol.  Patient was recommended to have ablation, was seen by Dr. Reina, was scheduled for ablation-and canceled ablation.  She returns today with ongoing symptoms caused by her atrial flutter and atrial fibrillation.    -Reassured patient that she needs the ablation and that she has failed multiple different medication options.  Offered to change patient medications and have her go through cardioversion while waiting for ablation.  Patient did not want to do this.    -Patient has extreme medical anxiety because her mother has been through multiple cardiac problems throughout her entire life and she is fearful.  I reassured her echocardiogram historically had shown her ejection fraction to be 58% which is essentially normal.    -Due to scheduling constraints I have recommended patient see Dr. Moreau to see if she can get scheduled for an ablation sooner rather than Dr. Reina as scheduled.    2.  BELA: Patient reports multiple stressors in her life.     -I have discussed with patient if anxiety is a barrier to following through with the ablation I am happy to offer her a few pills to treat anxiety the day before and the day of procedure if she feels this is necessary.    Follow-up with first available appointment with Dr. Moreau     History of Present Illness/Subjective     Ms. Tania Kaufman is a 55 y.o. female who comes in today for follow-up of atrial fibrillation and persistent atrial flutter    Eden reports experiencing atrial fibrillation and atrial flutter multiple times a day but can go up to 1 week without experiencing atrial fibrillation or flutter.  Her symptoms include dizziness, lightheadedness, difficulty to take a full breath while in a rapid heart rhythm, and throbbing sensation in her head.      She denies chest discomfort, palpitations, abdominal fullness/bloating or peripheral edema, shortness of breath, paroxysmal nocturnal dyspnea, orthopnea, dizziness, pre-syncope, or syncope.      Cardiographics (personally reviewed):  ECG done today, shows atrial flutter ventricular rate 94, QRS duration 112 ms, QT measures approximately 320 ms-machine unable to properly calculate due to flutter waves  ECHO done 2/24/2020Summary      Left ventricle ejection fraction is normal. The calculated left ventricular ejection fraction is 58%.    The right ventricle is mildly dilated. The systolic function is mildly reduced.    No hemodynamically significant valvular heart abnormalities.    No previous study for comparison.      :    NM stress test done 12/21/2020   There is no prior study for comparison.     The nuclear stress test is negative for inducible myocardial ischemia or infarction.     The patient is at a low risk of future cardiac ischemic events.     The left ventricular ejection fraction at rest is greater than 70%.     The patient's exercise capacity is average.  No chest pain with activity.     Left ventricular function is normal.     Stress electrocardiogram was negative for inducible ischemia.   :    I have reviewed and updated the patient's Past Medical History, Social History, Family History and Medication List.     Physical Examination Review of Systems   There were no vitals filed for this visit.  There is no height or weight on file to calculate BMI.  Wt Readings  from Last 3 Encounters:   10/19/20 216 lb 12.8 oz (98.3 kg)   03/05/20 212 lb (96.2 kg)   02/24/20 215 lb (97.5 kg)       General Appearance:   Alert, well-appearing and in no acute distress.   HEENT: Atraumatic, normocephalic.  No scleral icterus, normal conjunctivae, EOMs intact, PERRL.  Mucous membranes pink and moist.  No obvious thyromegaly.   Chest/Lungs:   Chest symmetric, spine straight.  Respirations unlabored.  Lungs are clear to auscultation.   Cardiovascular:   Iregular rate and rhythm.  Normal first and second heart sounds with no murmurs, rubs, or gallops; radial and posterior tibial pulses are intact, No edema.   Abdomen:  Soft, nontender, nondistended, bowel sounds present.   Extremities: No cyanosis or clubbing.   Musculoskeletal: Moves all extremities.  Gait steady.   Skin: Warm, dry, intact.    Neurologic: Mood and affect are appropriate.  Alert and oriented to person, place, time, and situation.                                                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[place]      PONV (postoperative nausea and vomiting)     Past Surgical History:   Procedure Laterality Date     NO PAST SURGERIES       PA CYSTO/URETERO W/LITHOTRIPSY &INDWELL STENT INSRT Right 6/11/2019    Procedure: CYSTOSCOPY, RIGHT RETROGRADE,  RIGHT URETEROSCOPY WITH LASER LITHOTRIPSY AND STENT INSERTION;  Surgeon: John Gutierrez MD;  Location: [facility] OR;  Service: Urology     wisdom teeth removal      Family History   Problem Relation Age of Onset     Cancer Mother         skin     Pacemaker Mother         4 open heart surgeries      Diverticulitis Father      Breast cancer Other 40        great aunt      Urolithiasis Brother         recurrent     Gout Neg Hx     Social History     Tobacco Use     Smoking status: Never Smoker     Smokeless tobacco: Never Used   Substance Use Topics     Alcohol use: Yes     Comment: social only     Drug use: No          Medications  Allergies   Current Outpatient Medications   Medication Sig Dispense Refill     apixaban ANTICOAGULANT (ELIQUIS) 5 mg Tab tablet Take 1 tablet (5 mg total) by mouth 2 (two) times a day. Please start 6-3-21, stop Aspirin when starting Eliquis 60 tablet 3     aspirin 81 MG EC tablet Take 1 tablet (81 mg total) by mouth daily.  0     co-enzyme Q-10 30 mg capsule Take 30 mg by mouth daily.       diltiazem (CARDIZEM CD) 120 MG 24 hr capsule Take 1 capsule (120 mg total) by mouth daily. 90 capsule 3     flecainide (TAMBOCOR) 100 MG tablet Take 1 tablet (100 mg total) by mouth 2 (two) times a day. 180 tablet 3     GLUCOSAMINE SULFATE (GLUCOSAMINE ORAL) Take by mouth. prn       L.ACID/L.CASEI/B.BIF/B.LUKE/FOS (PROBIOTIC BLEND ORAL) Take by mouth.       melatonin 3 mg Tab tablet Take 3 mg by mouth bedtime as needed.       multivitamin therapeutic (THERAGRAN) tablet Take 1 tablet by mouth daily.       NON FORMULARY              No current facility-administered medications for this visit.     No Known Allergies      Lab Results    Chemistry/lipid CBC Other   Lab Results   Component Value Date    CREATININE 0.88 02/20/2020    BUN 11 02/20/2020     02/20/2020    K 4.3 02/20/2020     02/20/2020    CO2 25 02/20/2020     Creatinine (mg/dL)   Date Value   02/20/2020 0.88   07/03/2019 1.01   06/03/2019 0.77   12/05/2016 0.79       Lab Results   Component Value Date    CHOL 178 09/11/2014    HDL 75 09/11/2014    Lab Results   Component Value Date    WBC 4.7 02/20/2020    HGB 13.1 02/20/2020    HCT 40.2 02/20/2020    MCV 91 02/20/2020     02/20/2020    Lab Results   Component Value Date    TSH 4.52 02/20/2020        This note has been dictated using voice  recognition software. Any grammatical, typographical, or context distortions are unintentional and inherent to the software.    Radha Albarran CNP  [facility]

## 2021-10-11 ENCOUNTER — HEALTH MAINTENANCE LETTER (OUTPATIENT)
Age: 56
End: 2021-10-11

## 2021-10-19 PROBLEM — I48.3 TYPICAL ATRIAL FLUTTER (H): Status: ACTIVE | Noted: 2021-10-19

## 2021-10-19 NOTE — PROGRESS NOTES
"Progress Notes by Florecita Cox CNP at 1/20/2021  3:30 PM     Author: Florecita Cox CNP Service: -- Author Type: Nurse Practitioner    Filed: 1/20/2021  4:04 PM Encounter Date: 1/20/2021 Status: Signed    : Florecita Cox CNP (Nurse Practitioner)           The patient has been notified of following:     \"This video visit will be conducted via a call between you and your physician/provider. We have found that certain health care needs can be provided without the need for an in-person physical exam.  This service lets us provide the care you need with a video conversation.  If a prescription is necessary we can send it directly to your pharmacy.  If lab work is needed we can place an order for that and you can then stop by our lab to have the test done at a later time.      Patient has given verbal consent to a Video visit? Yes    HEART CARE VIDEO ENCOUNTER        The patient has chosen to have the visit conducted as a video visit, to reduce risk of exposure given the current status of Coronavirus in our community. This video visit is being conducted via a call between the patient and physician/provider. Health care needs are being provided without a physical exam.     Assessment/Recommendations   Assessment/Plan:  Paroxysmal atrial fibrillation: She continues to have frequent episodes of A. fib despite starting low-dose flecainide. We reviewed the physiology and natural progression of atrial fibrillation and treatment options of medications or pulmonary vein isolation ablation.   She would be a good candidate for PVI, but is not quite ready.  Increase flecainide to 100 mg twice daily.  Continue diltiazem 120 mg daily.     She was reassured that atrial fibrillation is not life-threatening, but carries an increased risk for stroke.  She has a [id number] score of 1 for female gender; per current guidelines anticoagulation not indicated.  Continue aspirin 81 mg daily for stroke prophylaxis.    Follow Up Plan: 1 " month  I have reviewed the note as documented.  This accurately captures the substance of my conversation with the patient.    Total time of video between patient and provider was 6 minutes   Start Time: 1544   Stop Time: 1550    Originating Location (pt. Location): Home    Distant Location (provider location):  [facility]     Mode of Communication:  Video Conference via [employer]       History of Present Illness/Subjective    Tania Kaufman is a 55 y.o. female who is being evaluated via a billable video visit and has consented to a video visit.   She has a history of atrial fibrillation, anxiety, diverticulitis, IBS, and recurrent kidney stones.  She was diagnosed with atrial fibrillation in February 2020, though began having palpitations in the summer 2019.  Symptoms consist of tachypalpitations, dyspnea on exertion, and lightheadedness.  Episodes were occurring every 1 to 2 months, but have been increasing in frequency and duration, occurring almost daily and lasting up to 6 hours.   Echo shows normal structure and function.  Event monitor documented atrial fibrillation with rapid ventricular response.   She was started on low-dose metoprolol succinate and Eliquis on 2/21/2020 with subsequent decrease in AF frequency and symptom severity, but with hypotension.  Metoprolol was switched to diltiazem.   She again had an increase in AF frequency.  Nuclear stress test was negative for ischemia.  She was started on low-dose flecainide. She remains on low-dose daily aspirin for stroke prophylaxis.     Eden states that continues to have frequent episodes of A. fib, despite starting flecainide. She denies chest discomfort, abdominal fullness/bloating or peripheral edema, shortness of breath, paroxysmal nocturnal dyspnea, orthopnea, lightheadedness, dizziness, pre-syncope, or syncope.       Cardiographics (EKG personally reviewed):  EKG done 2/10/2020 shows sinus rhythm at 62 bpm, QT/QTc  interval measures 418/424 ms     Event monitor worn for 1 week beginning on 2/17/2020 shows paroxysmal atrial fibrillation with rapid ventricular response 105-160 bpm.  Symptoms noted for all episodes of A. fib.  AF burden approximately 6%.  No significant bradycardia or pauses.  No significant ventricular arrhythmia.     ECHO done 2/24/2020:    Left ventricle ejection fraction is normal. The calculated left ventricular ejection fraction is 58%.    The right ventricle is mildly dilated. The systolic function is mildly reduced.    No hemodynamically significant valvular heart abnormalities.    No previous study for comparison.    NM stress test done December 21, 2020:  ?  There is no prior study for comparison.  ?  The nuclear stress test is negative for inducible myocardial ischemia or infarction.  ?  The patient is at a low risk of future cardiac ischemic events.  ?  The left ventricular ejection fraction at rest is greater than 70%.  ?  The patient's exercise capacity is average.  No chest pain with activity.  ?  Left ventricular function is normal.  ?  Stress electrocardiogram was negative for inducible ischemia.    I have reviewed and updated the patient's Past Medical History, Social History, Family History and Medication List.     Physical Examination performed via live video encounter Review of Systems   General Appearance:    Well appearing, in no distress, normal body habitus, upright.   ENT/Mouth: membranes moist, no nasal discharge or bleeding gums.  Normal head shape, no evidence of injury or laceration.     EYES:  no scleral icterus, normal conjunctivae   Neck: no evidence of thyromegaly.  Supple   Chest/Lungs:   No audible wheezing equal chest wall expansion. Non labored breathing.  No cough.   Cardiovascular:   No evidence of elevated jugular venous pressure.  No evidence of pitting edema bilaterally    Abdomen:  no evidence of abdominal distention. No observe juandice.     Extremities: no cyanosis or  clubbing noted.    Skin: no xanthelasma, normal skin color. No evidence of facial lacerations.      Neurologic: Normal arm motion bilateral, no tremors.  No evidence of focal defect.       Psychiatric: alert and oriented x3, calm, mood and affect appropriate.              See CMA note attached, personally reviewed.                                Medical History  Surgical History Family History Social History   Past Medical History:   Diagnosis Date   ? Anxiety    ? Biliary colic    ? Diverticulitis    ? Gallstones    ? Irritable bowel syndrome    ? Irritable bowel syndrome with diarrhea    ? Kidney stone     at age 42   ? 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 PONV (postoperative nausea and vomiting)     Past Surgical History:   Procedure Laterality Date   ? NO PAST SURGERIES     ? [place] CYSTO/URETERO W/LITHOTRIPSY &INDWELL STENT INSRT Right 6/11/2019    Procedure: CYSTOSCOPY, RIGHT RETROGRADE,  RIGHT URETEROSCOPY WITH LASER LITHOTRIPSY AND STENT INSERTION;  Surgeon: John Gutierrez MD;  Location: [facility];  Service: Urology   ? wisdom teeth removal      Family History   Problem Relation Age of Onset   ? Cancer Mother         skin   ? Pacemaker Mother         4 open heart surgeries    ? Diverticulitis Father    ? Breast cancer Other 40        great aunt   ? Urolithiasis Brother         recurrent   ? Gout Neg Hx       Social History     Socioeconomic History   ? Marital status:      Spouse name: Not on file   ? Number of children: Not on file   ? Years of education: Not on file   ? Highest education level: Not on file   Occupational History   ? Occupation:     Social Needs   ? Financial resource strain: Not on file   ? Food insecurity     Worry: Not on file     Inability: Not on file   ? Transportation needs     Medical: Not on file     Non-medical: Not on file   Tobacco Use   ? Smoking status: Never Smoker   ? Smokeless tobacco: Never Used   Substance and  Sexual Activity   ? Alcohol use: Yes     Comment: social only   ? Drug use: No   ? Sexual activity: Not on file   Lifestyle   ? Physical activity     Days per week: Not on file     Minutes per session: Not on file   ? Stress: Not on file   Relationships   ? Social connections     Talks on phone: Not on file     Gets together: Not on file     Attends [employer] service: Not on file     Active member of club or organization: Not on file     Attends meetings of clubs or organizations: Not on file     Relationship status: Not on file   ? Intimate partner violence     Fear of current or ex partner: Not on file     Emotionally abused: Not on file     Physically abused: Not on file     Forced sexual activity: Not on file   Other Topics Concern   ? Not on file   Social History Narrative   ? Not on file          Medications  Allergies   Current Outpatient Medications   Medication Sig Dispense Refill   ? aspirin 81 MG EC tablet Take 1 tablet (81 mg total) by mouth daily.  0   ? co-enzyme Q-10 30 mg capsule Take 30 mg by mouth daily.     ? diltiazem (CARDIZEM CD) 120 MG 24 hr capsule Take 1 capsule (120 mg total) by mouth daily. 30 capsule 11   ? flecainide (TAMBOCOR) 100 MG tablet Take 1 tablet (100 mg total) by mouth 2 (two) times a day. 60 tablet 11   ? GLUCOSAMINE SULFATE (GLUCOSAMINE ORAL) Take by mouth. prn     ? L.ACID/L.CASEI/B.BIF/B.LUKE/FOS (PROBIOTIC BLEND ORAL) Take by mouth.     ? melatonin 3 mg Tab tablet Take 3 mg by mouth bedtime as needed.     ? multivitamin therapeutic (THERAGRAN) tablet Take 1 tablet by mouth daily.     ? NON FORMULARY            ? NON FORMULARY Phytomega   One tab daily       No current facility-administered medications for this visit.     No Known Allergies      Lab Results    Chemistry/lipid CBC Cardiac Enzymes/BNP/TSH/INR   Lab Results   Component Value Date    CHOL 178 09/11/2014    HDL 75 09/11/2014    LDLCALC 87 09/11/2014    TRIG 82 09/11/2014    CREATININE 0.88 02/20/2020    BUN 11  02/20/2020    K 4.3 02/20/2020     02/20/2020     02/20/2020    CO2 25 02/20/2020    Lab Results   Component Value Date    WBC 4.7 02/20/2020    HGB 13.1 02/20/2020    HCT 40.2 02/20/2020    MCV 91 02/20/2020     02/20/2020    Lab Results   Component Value Date    TSH 4.52 02/20/2020        Florecita Cox CNP  Cardiac Electrophysiology                                          no

## 2021-11-09 ENCOUNTER — OFFICE VISIT (OUTPATIENT)
Dept: CARDIOLOGY | Facility: CLINIC | Age: 56
End: 2021-11-09
Payer: COMMERCIAL

## 2021-11-09 DIAGNOSIS — I48.3 TYPICAL ATRIAL FLUTTER (H): ICD-10-CM

## 2021-11-09 DIAGNOSIS — I48.0 PAROXYSMAL ATRIAL FIBRILLATION (H): Primary | ICD-10-CM

## 2021-11-09 PROCEDURE — 99215 OFFICE O/P EST HI 40 MIN: CPT | Performed by: INTERNAL MEDICINE

## 2021-11-09 NOTE — LETTER
2021    Lashonda Arango MD  [address] [facility] [address]    RE: Tania Kaufman       Dear Colleague,    I had the pleasure of seeing Tania Kaufman in the [facility].     [facility]  Cardiac Electrophysiology  [address]  [place] [address]   Office: [phone]  Fax: [phone]     Cardiac Electrophysiology Consultation    Patient: Tania Kaufman   : 1965     Referring Provider: Radha Albarran CNP  Primary Care Provider: Lashonda Arango MD    CHIEF COMPLAINT/REASON FOR CONSULTATION  Persistent atrial fibrillation and typical appearing atrial flutter    Assessment/Recommendations   Tania Kaufman is a 55 year old female with persistent atrial fibrillation and typical appearing atrial flutter, anxiety referred by Estrella Albarran for evaluation of atrial fibrillation and flutter.    Persistent atrial fibrillation and typical appearing atrial flutter - symptomatic with palpitations, lightheadedness, fatigue  [id number] 0-1  We reviewed the pathophysiology of atrial fibrillation and flutter and management options including considerations regarding stroke risk and anticoagulation, rate control, antiarrhythmic drug therapy, and catheter ablation.  We discussed atrial fibrillation and flutter ablation procedures, anticipated success rates, the potential need for re-do ablation vs addition of anti-arrhythmic drugs, procedural risks (including groin bleeding, tamponade, phrenic or esophageal injury, stroke, pulmonary vein stenosis) and recovery expectations.  She will take some additional time to consider options, and will contact us with decision as to how she would like to proceed or with additional questions  - if ablation is elected upon, PVI, general anesthesia, start apixaban 5mg twice daily 3 weeks prior to ablation to continue 3 months post ablation, hold other medications  day of procedure.  She underwent MR PV 4/21/2021  - given her [id number] 0-1, long term continuation of therapeutic anticoagulation is not indicated   - can continue flecainide 100mg twice daily for now - caution given atrial flutter  - continue diltiazem CD 120mg daily  - discussed the ongoing importance of lifestyle modification (maintaining healthy weight, sodium restriction, sleep apnea diagnosis and management, alcohol avoidance) as part of a long term strategy for atrial fibrillation management    Follow up: as above         History of Present Illness   Tania Kaufman is a 55 year old female with persistent atrial fibrillation and typical appearing atrial flutter, anxiety referred by Estrella Albarran for evaluation of atrial fibrillation and flutter.    Mrs. Kaufman notes palpitations, lightheadedness and fatigue since early 2020 and was diagnosed with atrial fibrillation and flutter around 2/2020.  She was started on flecainide and diltiazem, though has had breakthrough symptoms occurring every few days lasting for a few hours.  She had previously been scheduled for ablation with Dr. Reina 7/2021 - this was cancelled due to concerns regarding post-menopausal vaginal bleeding and anxiety.      Her mother had atrial fibrillation and underwent PVI, has a PPM, has undergone multiple valve replacements.      She denies syncope, chest pain, lower extremity edema.       Physical Examination  Review of Systems   VITALS: There were no vitals taken for this visit.  Wt Readings from Last 3 Encounters:   11/09/21 95.7 kg (211 lb)   10/06/21 95.6 kg (210 lb 12.8 oz)   06/22/21 97.7 kg (215 lb 6.4 oz)     CONSTITUTIONAL: well nourished, comfortable, no distress  EYES:  Conjunctivae pink, sclerae clear.    E/N/T:  Oral mucosa pink, moist mucous membranes, dentition normal.   RESPIRATORY:  Respiratory effort is normal  CARDIOVASCULAR:  Irregular, normal S1 and S2  GASTROINTESTINAL:  Abdomen without masses or  tenderness  EXTREMITIES:  No clubbing or cyanosis.    MUSCULOSKELETAL:  Overall grossly normal muscle strength  SKIN:  Overall, skin warm and dry, no lesions.  NEURO/PSYCH:  Oriented x 3 with normal affect.   Constitutional:  No weight loss or loss of appetite    Eyes:  No difficulty with vision, no double vision, no dry eyes  ENT:  No sore throat, difficulty swallowing; changes in hearing or tinnitus  Cardiovascular: As detailed above  Respiratory:  No cough  Musculoskeletal  No joint pain, muscle aches  Neurologic:  No syncope, lightheadedness, fainting spells   Hematologic: No easy bruising, excessive bleeding tendency   Gastrointestinal:  No jaundice, abdominal pain or abdominal bloating  Genitourinary: No changes in urinary habits, no trouble urinating    Psychiatric: No anxiety or depression      Medical History  Surgical History   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      PONV (postoperative nausea and vomiting)      PONV (postoperative nausea and vomiting)     Past Surgical History:   Procedure Laterality Date     NO PAST SURGERIES       OTHER SURGICAL HISTORY      wisdom teeth removal     AR CYSTO/URETERO W/LITHOTRIPSY &INDWELL STENT INSRT Right 6/11/2019    Procedure: CYSTOSCOPY, RIGHT RETROGRADE,  RIGHT URETEROSCOPY WITH LASER LITHOTRIPSY AND STENT INSERTION;  Surgeon: John Gutierrez MD;  Location: [facility];  Service: Urology         Family History Social History   Family History   Problem Relation Age of Onset      Cancer Mother         skin     Pacemaker Mother         4 open heart surgeries      Diverticulitis Father      Breast Cancer Other 40.00        great aunt     Urolithiasis Brother         recurrent     Gout No family hx of         Social History     Tobacco Use     Smoking status: Never Smoker     Smokeless tobacco: Never Used   Substance Use Topics     Alcohol use: Yes     Comment: Alcoholic Drinks/day: social only     Drug use: No         Medications  Allergies     Current Outpatient Medications:      apixaban ANTICOAGULANT (ELIQUIS) 5 mg Tab tablet, [APIXABAN ANTICOAGULANT (ELIQUIS) 5 MG TAB TABLET] Take 1 tablet (5 mg total) by mouth 2 (two) times a day. Please start 6-3-21, stop Aspirin when starting Eliquis, Disp: 60 tablet, Rfl: 3     aspirin 81 MG EC tablet, [ASPIRIN 81 MG EC TABLET] Take 1 tablet (81 mg total) by mouth daily., Disp: , Rfl: 0     co-enzyme Q-10 30 mg capsule, [CO-ENZYME Q-10 30 MG CAPSULE] Take 30 mg by mouth daily., Disp: , Rfl:      diltiazem ER COATED BEADS (CARDIZEM CD/CARTIA XT) 120 MG 24 hr capsule, Take 1 capsule (120 mg) by mouth daily, Disp: 90 capsule, Rfl: 3     flecainide (TAMBOCOR) 150 MG tablet, Take 1 tablet (150 mg) by mouth 2 times daily, Disp: 180 tablet, Rfl: 1     GLUCOSAMINE SULFATE (GLUCOSAMINE ORAL), Take 1 tablet by mouth daily , Disp: , Rfl:      L.ACID/L.CASEI/B.BIF/B.LUKE/FOS (PROBIOTIC BLEND ORAL), Take 1 tablet by mouth 2 times daily as needed , Disp: , Rfl:      melatonin 3 mg Tab tablet, [MELATONIN 3 MG TAB TABLET] Take 3 mg by mouth bedtime as needed., Disp: , Rfl:      multivitamin therapeutic (THERAGRAN) tablet, [MULTIVITAMIN THERAPEUTIC (THERAGRAN) TABLET] Take 1 tablet by mouth daily., Disp: , Rfl:      NON FORMULARY, daily vitamins, Disp: , Rfl:    No Known Allergies       Lab Results    Chemistry CBC Cardiac Enzymes/BNP/TSH/INR   Recent Labs   Lab Test 06/22/21  1501      POTASSIUM 4.3   CHLORIDE 105   CO2 24      BUN 11   CR 0.80    GFRESTIMATED >60   INDIA 9.7     Recent Labs   Lab Test 06/22/21  1501 02/20/20  0823 07/03/19  2355   CR 0.80 0.88 1.01          Recent Labs   Lab Test 06/22/21  1501   WBC 9.4   HGB 14.7   HCT 44.4   MCV 90        Recent Labs   Lab Test 06/22/21  1501 02/20/20  0823 07/03/19  2355   HGB 14.7 13.1 13.1    No results for input(s): TROPONINI in the last 20872 hours.  No results for input(s): BNP, NTBNPI, NTBNP in the last 64805 hours.  Recent Labs   Lab Test 02/20/20  0823   TSH 4.52     No results for input(s): INR in the last 26167 hours.      Data Review    ECGs (tracings independently reviewed)  10/6/2021 - typical appearing AFl, ventricular rate 94bpm  6/22/2021 - typical appearing AFl, ventricular rate 107bpm  2/10/2020 - SR 62bpm      2/24/2020 TTE    Left ventricle ejection fraction is normal. The calculated left ventricular ejection fraction is 58%.    The right ventricle is mildly dilated. The systolic function is mildly reduced.    No hemodynamically significant valvular heart abnormalities.    No previous study for comparison.       Cc: Nba Servin CNP, Jennifer Marie, MD Amila Dilusha William, MD  11/9/2021  2:07 PM

## 2021-11-09 NOTE — PROGRESS NOTES
[facility]  Cardiac Electrophysiology  [address]  [place] [address]   Office: [phone]  Fax: [phone]     Cardiac Electrophysiology Consultation    Patient: Tania Kaufman   : 1965     Referring Provider: Radha Albarran CNP  Primary Care Provider: Lashonda Arango MD    CHIEF COMPLAINT/REASON FOR CONSULTATION  Persistent atrial fibrillation and typical appearing atrial flutter    Assessment/Recommendations   Tania Kaufman is a 55 year old female with persistent atrial fibrillation and typical appearing atrial flutter, anxiety referred by Estrella Albarran for evaluation of atrial fibrillation and flutter.    Persistent atrial fibrillation and typical appearing atrial flutter - symptomatic with palpitations, lightheadedness, fatigue  [id number] 0-1  We reviewed the pathophysiology of atrial fibrillation and flutter and management options including considerations regarding stroke risk and anticoagulation, rate control, antiarrhythmic drug therapy, and catheter ablation.  We discussed atrial fibrillation and flutter ablation procedures, anticipated success rates, the potential need for re-do ablation vs addition of anti-arrhythmic drugs, procedural risks (including groin bleeding, tamponade, phrenic or esophageal injury, stroke, pulmonary vein stenosis) and recovery expectations.  She will take some additional time to consider options, and will contact us with decision as to how she would like to proceed or with additional questions  - if ablation is elected upon, PVI, general anesthesia, start apixaban 5mg twice daily 3 weeks prior to ablation to continue 3 months post ablation, hold other medications day of procedure.  She underwent MR PV 2021  - given her [id number] 0-1, long term continuation of therapeutic anticoagulation is not indicated   - can continue flecainide 100mg twice daily for now - caution given atrial flutter  - continue  diltiazem CD 120mg daily  - discussed the ongoing importance of lifestyle modification (maintaining healthy weight, sodium restriction, sleep apnea diagnosis and management, alcohol avoidance) as part of a long term strategy for atrial fibrillation management    Follow up: as above         History of Present Illness   Tania Kaufman is a 55 year old female with persistent atrial fibrillation and typical appearing atrial flutter, anxiety referred by Estrella Albarran for evaluation of atrial fibrillation and flutter.    Mrs. Kaufman notes palpitations, lightheadedness and fatigue since early 2020 and was diagnosed with atrial fibrillation and flutter around 2/2020.  She was started on flecainide and diltiazem, though has had breakthrough symptoms occurring every few days lasting for a few hours.  She had previously been scheduled for ablation with Dr. Reina 7/2021 - this was cancelled due to concerns regarding post-menopausal vaginal bleeding and anxiety.      Her mother had atrial fibrillation and underwent PVI, has a PPM, has undergone multiple valve replacements.      She denies syncope, chest pain, lower extremity edema.       Physical Examination  Review of Systems   VITALS: There were no vitals taken for this visit.  Wt Readings from Last 3 Encounters:   11/09/21 95.7 kg (211 lb)   10/06/21 95.6 kg (210 lb 12.8 oz)   06/22/21 97.7 kg (215 lb 6.4 oz)     CONSTITUTIONAL: well nourished, comfortable, no distress  EYES:  Conjunctivae pink, sclerae clear.    E/N/T:  Oral mucosa pink, moist mucous membranes, dentition normal.   RESPIRATORY:  Respiratory effort is normal  CARDIOVASCULAR:  Irregular, normal S1 and S2  GASTROINTESTINAL:  Abdomen without masses or tenderness  EXTREMITIES:  No clubbing or cyanosis.    MUSCULOSKELETAL:  Overall grossly normal muscle strength  SKIN:  Overall, skin warm and dry, no lesions.  NEURO/PSYCH:  Oriented x 3 with normal affect.   Constitutional:  No weight loss or loss of appetite     Eyes:  No difficulty with vision, no double vision, no dry eyes  ENT:  No sore throat, difficulty swallowing; changes in hearing or tinnitus  Cardiovascular: As detailed above  Respiratory:  No cough  Musculoskeletal  No joint pain, muscle aches  Neurologic:  No syncope, lightheadedness, fainting spells   Hematologic: No easy bruising, excessive bleeding tendency   Gastrointestinal:  No jaundice, abdominal pain or abdominal bloating  Genitourinary: No changes in urinary habits, no trouble urinating    Psychiatric: No anxiety or depression      Medical History  Surgical History   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      Pigmented purpuric lichenoid dermatitis of Gougerot and Gregory      PONV (postoperative nausea and vomiting)      PONV (postoperative nausea and vomiting)     Past Surgical History:   Procedure Laterality Date     NO PAST SURGERIES       OTHER SURGICAL HISTORY      wisdom teeth removal     [place] CYSTO/URETERO W/LITHOTRIPSY &INDWELL STENT INSRT Right 6/11/2019    Procedure: CYSTOSCOPY, RIGHT RETROGRADE,  RIGHT URETEROSCOPY WITH LASER LITHOTRIPSY AND STENT INSERTION;  Surgeon: John Gutierrez MD;  Location: [facility];  Service: Urology         Family History Social History   Family History   Problem Relation Age of Onset     Cancer Mother         skin     Pacemaker Mother         4 open heart surgeries      Diverticulitis Father      Breast Cancer Other 40.00        great aunt     Urolithiasis Brother         recurrent     Gout No family hx of         Social History     Tobacco Use      Smoking status: Never Smoker     Smokeless tobacco: Never Used   Substance Use Topics     Alcohol use: Yes     Comment: Alcoholic Drinks/day: social only     Drug use: No         Medications  Allergies     Current Outpatient Medications:      apixaban ANTICOAGULANT (ELIQUIS) 5 mg Tab tablet, [APIXABAN ANTICOAGULANT (ELIQUIS) 5 MG TAB TABLET] Take 1 tablet (5 mg total) by mouth 2 (two) times a day. Please start 6-3-21, stop Aspirin when starting Eliquis, Disp: 60 tablet, Rfl: 3     aspirin 81 MG EC tablet, [ASPIRIN 81 MG EC TABLET] Take 1 tablet (81 mg total) by mouth daily., Disp: , Rfl: 0     co-enzyme Q-10 30 mg capsule, [CO-ENZYME Q-10 30 MG CAPSULE] Take 30 mg by mouth daily., Disp: , Rfl:      diltiazem ER COATED BEADS (CARDIZEM CD/CARTIA XT) 120 MG 24 hr capsule, Take 1 capsule (120 mg) by mouth daily, Disp: 90 capsule, Rfl: 3     flecainide (TAMBOCOR) 150 MG tablet, Take 1 tablet (150 mg) by mouth 2 times daily, Disp: 180 tablet, Rfl: 1     GLUCOSAMINE SULFATE (GLUCOSAMINE ORAL), Take 1 tablet by mouth daily , Disp: , Rfl:      L.ACID/L.CASEI/B.BIF/B.LUKE/FOS (PROBIOTIC BLEND ORAL), Take 1 tablet by mouth 2 times daily as needed , Disp: , Rfl:      melatonin 3 mg Tab tablet, [MELATONIN 3 MG TAB TABLET] Take 3 mg by mouth bedtime as needed., Disp: , Rfl:      multivitamin therapeutic (THERAGRAN) tablet, [MULTIVITAMIN THERAPEUTIC (THERAGRAN) TABLET] Take 1 tablet by mouth daily., Disp: , Rfl:      NON FORMULARY, daily vitamins, Disp: , Rfl:    No Known Allergies       Lab Results    Chemistry CBC Cardiac Enzymes/BNP/TSH/INR   Recent Labs   Lab Test 06/22/21  1501      POTASSIUM 4.3   CHLORIDE 105   CO2 24      BUN 11   CR 0.80   GFRESTIMATED >60   INDIA 9.7     Recent Labs   Lab Test 06/22/21  1501 02/20/20  0823 07/03/19  2355   CR 0.80 0.88 1.01          Recent Labs   Lab Test 06/22/21  1501   WBC 9.4   HGB 14.7   HCT 44.4   MCV 90        Recent Labs   Lab Test 06/22/21  1501 02/20/20  0858  07/03/19  2355   HGB 14.7 13.1 13.1    No results for input(s): TROPONINI in the last 98144 hours.  No results for input(s): BNP, NTBNPI, NTBNP in the last 17177 hours.  Recent Labs   Lab Test 02/20/20  0823   TSH 4.52     No results for input(s): INR in the last 57724 hours.      Data Review    ECGs (tracings independently reviewed)  10/6/2021 - typical appearing AFl, ventricular rate 94bpm  6/22/2021 - typical appearing AFl, ventricular rate 107bpm  2/10/2020 - SR 62bpm      2/24/2020 TTE    Left ventricle ejection fraction is normal. The calculated left ventricular ejection fraction is 58%.    The right ventricle is mildly dilated. The systolic function is mildly reduced.    No hemodynamically significant valvular heart abnormalities.    No previous study for comparison.       Cc: Nba Servin CNP, Jennifer Marie, MD Amila Dilusha William, MD  11/9/2021  2:07 PM

## 2021-11-09 NOTE — PATIENT INSTRUCTIONS
[facility]  Cardiac Electrophysiology  [address]  [place] [address]   Office: [phone]  Fax: [phone]       Thank you for seeing us in clinic today - it is a pleasure to be a part of your care team.  Below is a summary of our plan from today's visit.      You have atrial fibrillation and typical atrial flutter.  A catheter ablation is a reasonable consideration in your case.  Please consider this further, and let us know with any questions or decisions.      Our goals are to help you understand your condition and available treatment options, and to work with you to develop a tailored treatment plan that best suits you.  Please do not hesitate to be in touch with our office at [phone] with any questions that may arise.      Sincerely,    Seven Moreau MD  Clinical Cardiac Electrophysiology  [facility]  [address]  [place] [address]   Office: [phone]  Fax: [phone]

## 2021-11-17 ENCOUNTER — DOCUMENTATION ONLY (OUTPATIENT)
Dept: CARDIOLOGY | Facility: CLINIC | Age: 56
End: 2021-11-17
Payer: COMMERCIAL

## 2021-11-17 ENCOUNTER — TELEPHONE (OUTPATIENT)
Dept: CARDIOLOGY | Facility: CLINIC | Age: 56
End: 2021-11-17
Payer: COMMERCIAL

## 2021-11-17 DIAGNOSIS — I48.0 PAROXYSMAL ATRIAL FIBRILLATION (H): Primary | ICD-10-CM

## 2021-11-17 NOTE — PROGRESS NOTES
"H&P [x]  Date: DW 11/9 Teach []  Date: PVI  Clinic N [x]  Y [] PARRISH N [x] Y[]  Order [] CT  MRI MR on 4/21  Order []   PVI  Order Case Req []  Order Set [] Lab/EKG   []  Orders Letter [] F/U RN [] Date:  NP [] Date:     COVID FV/EPIC []  External []  Date: AC Eliquis [x]   Xarelot []  Pradaxa []  Warfarin [] Start [x] 3 wks prior  Cont []  Switch [] to:   INR Reminder EP [] & INR Msg to AC team []     AAD  Flecainide         Hold x3 days [x]  Continue [] PPI Protonix 40mg QD [x]  Pepcid 20mg BID []  Omeprazole 40mg QD []  Continue current PPI []  Increase [] :       1965  Home:[phone] (home) Cell:[phone] (mobile)  Emergency Contact: Dong Edwards [phone]    Important patient information for CSC/Cath Lab staff : None    [facility] EP Cath Lab Procedure Order   Ablation Type:  \"PVI- Atrial Fibrillation  and A Flutter  Specific location of pathway: Left  Diagnosis:  AF  And AFL    Scheduling Information:  Anticipated Case Duration: Persistent AF with AFL PVI- 5 hr, would need to limit schedule to allow no further ablations, but ok for device implants to follow on scheduled date   Scheduling Timeframe:  COVID Scheduling 30-90 days (Tier 3)  Clinic Arrangements prior to PVI: Schedule pt directly for PVI procedure with designated MD listed below, pt to see EP NP only for H&P and EP RN for education prior  Scheduling Restrictions: Will need to start anticoagulant 4 wks prior- schedule accordingly  Scheduling Contact: Please contact pt to schedule, if you are unable to schedule date within the next 24 hours please contact pt to update on scheduling process  EP RN Follow Up Apt: Schedule telephone visit for post op follow up 3-4 days po    MD Preference: Dr Prieto CAMARENA Assist:  No Specific MD:  N/A    Current Device: None None  Device Company/Device Rep Needed for Procedure: None    Pre-Procedural Testing needed: COVID 19 nasal/lab test, BMP, CBC with Plt, and Type and Screen AM of Case and Beta Hcg AM of " case  Mapping System Required:  Carto  ICE Needed:  Yes  Special equipment/Staff needed for case: None  Anesthesia:  General-Whole Case  Research Protocol:  No    [facility] EP Cath Lab Prep   Ordering Provider:Dr Moreau  Ordering Date: 11/17/2021  Orders Status: Intial order placed and Order set placed    H&P:  Compled by Dr Moreau on 11/9 if scheduled within 30 days, pt to schedule with EP BOSSMAN if procedure outside of this timeframe  PCP: Lashonda Arango, [phone]    Pre-op Labs: Ordered AM of procedure    Medical Records Pertinent for Procedure:  CT/MRI 4/21/21 and Stress Test 12/21/20 Negative EF 70%  Important Past Medical Hx/Diagnosis: CHADS VASC 0-1   Most Recent Lab Results: BMP- Within Acceptable Limits for Procedure Heme- Within Acceptable Limits for Procedure    Patient Education:  Teach with Patient: Please arrange PC visit 5-7 days prior to PVI procedure with EP RN if you are able to schedule within 30 days from last OV/H&P, if unable to schedule within that time frame please arrange teach at time of H&P with EP BOSSMAN    Risks Reviewed:   Pulmonary Vein/AF/Radiofrequency Ablation  In addition to standard risks for Radiofrequency Ablation, there is:    <2% for significant pulmonary vein stenosis    <2% risk for embolic events    <1% risk for esophageal fistula    <1% risk death      Pulmonary Vein Isolation / Cryoablation Risks:    1-2% risk for phrenic nerve paralysis    <1% risk for pulmonary vein stenosis    Risk of esophageal irritation with no incidence of atrial-esophageal fistula    Rare cryoballoon rupture    <1% risk death       Cardiac Catheter Ablation    <1% risk for the following: hypotension, hemorrhage, vascular injury including perforation of vein, artery or heart, thrombophlebitis, systemic or pulmonic emboli; cardiac perforation, (tamponade), infection, pneumothorax, arrhythmias, proarrhythmic effects of drugs, radiation exposure.    1-2% complete heart block (for AVNRT or septol  accessory pathway).    <0.5% CVA or MI    <0.1% death    If external defibrillation is needed, 75% risk for superficial burn.    1-2% tamponade and aortic puncture with left sided transeptal approach for left side BERTIN - increase risk of CVA to <2%.    Late arrhythmia recurrences depends upon the primary rhythm disturbance.      Pre-Procedure Instructions that were Reviewed with Patient:  NPO after midnight, Remove all jewelry prior to coming in for procedure, Shower prior to arrival, Notified patient of time and date of procedure by CV , Transportation arrangements needed s/p procedure, Post-procedure follow up process, Sedation plan/orders and Pre-procedure letter was sent to pt by CV     Pre-Procedure Medication Instructions:  Instructions given to pt regarding anticoagulants: Eliquis- Will start 3 wks prior to PVI  Instructions given to pt regarding antiarrhythmic medication: Flecainide; Pt instructed to hold 3 days prior to procedure  Instructions given to pt regarding PPI medication:Start Protonix 40mg Daily 3 days prior, and will continue 6 wks s/p PVI  Instructions for medication, other than anticoagulants and antiarrhythmics listed above, given to pt: to hold all morning medications   Allergies: Reviewed allergies, no concerns regarding orders for procedure  No Known Allergies    Current Outpatient Medications:      apixaban ANTICOAGULANT (ELIQUIS) 5 mg Tab tablet, [APIXABAN ANTICOAGULANT (ELIQUIS) 5 MG TAB TABLET] Take 1 tablet (5 mg total) by mouth 2 (two) times a day. Please start 6-3-21, stop Aspirin when starting Eliquis, Disp: 60 tablet, Rfl: 3     aspirin 81 MG EC tablet, [ASPIRIN 81 MG EC TABLET] Take 1 tablet (81 mg total) by mouth daily., Disp: , Rfl: 0     co-enzyme Q-10 30 mg capsule, [CO-ENZYME Q-10 30 MG CAPSULE] Take 30 mg by mouth daily., Disp: , Rfl:      diltiazem ER COATED BEADS (CARDIZEM CD/CARTIA XT) 120 MG 24 hr capsule, Take 1 capsule (120 mg) by mouth daily, Disp: 90  capsule, Rfl: 3     flecainide (TAMBOCOR) 150 MG tablet, Take 1 tablet (150 mg) by mouth 2 times daily, Disp: 180 tablet, Rfl: 1     GLUCOSAMINE SULFATE (GLUCOSAMINE ORAL), Take 1 tablet by mouth daily , Disp: , Rfl:      L.ACID/L.CASEI/B.BIF/B.LUKE/FOS (PROBIOTIC BLEND ORAL), Take 1 tablet by mouth 2 times daily as needed , Disp: , Rfl:      melatonin 3 mg Tab tablet, [MELATONIN 3 MG TAB TABLET] Take 3 mg by mouth bedtime as needed., Disp: , Rfl:      multivitamin therapeutic (THERAGRAN) tablet, [MULTIVITAMIN THERAPEUTIC (THERAGRAN) TABLET] Take 1 tablet by mouth daily., Disp: , Rfl:      NON FORMULARY, daily vitamins, Disp: , Rfl:     Documentation Date:11/17/2021 1:37 PM  Denise Veronica RN  HPI      ROS      Physical Exam

## 2021-11-17 NOTE — TELEPHONE ENCOUNTER
PC back to pt  Pt reports that she would like to go ahead with the PVI, per the last OV note from Dr Moreau  Advised pt that I would make scheduling aware  Pt is hopeful to have this done by the end of the year if possible  Pt verbalized understanding, has no further questions or concerns at this time, and has our contact information if needed.  11/17/2021 1:35 PM  Denise Veronica RN      Dr Moreau on 11/9:  if ablation is elected upon, PVI, general anesthesia, start apixaban 5mg twice daily 3 weeks prior to ablation to continue 3 months post ablation, hold other medications day of procedure.  She underwent MR PV 4/21/2021    ----- Message from Marissa Romero sent at 11/17/2021  1:22 PM CST -----  Regarding: LEEANN pt  General phone call:    Caller: Eden  Primary cardiologist: LEEANN   Detailed reason for call: She would like to schedule her ablation   Best phone number: [phone]  Best time to contact: any  Ok to leave a detailedmessage? yes  Device? no    Additional Info:

## 2021-12-01 ENCOUNTER — HOSPITAL ENCOUNTER (OUTPATIENT)
Dept: MAMMOGRAPHY | Facility: CLINIC | Age: 56
Discharge: HOME | End: 2021-12-01
Attending: FAMILY MEDICINE | Admitting: FAMILY MEDICINE
Payer: COMMERCIAL

## 2021-12-01 DIAGNOSIS — Z12.31 VISIT FOR SCREENING MAMMOGRAM: ICD-10-CM

## 2021-12-01 PROCEDURE — 77063 BREAST TOMOSYNTHESIS BI: CPT

## 2022-03-09 ENCOUNTER — TELEPHONE (OUTPATIENT)
Dept: CARDIOLOGY | Facility: CLINIC | Age: 57
End: 2022-03-09
Payer: COMMERCIAL

## 2022-03-09 NOTE — TELEPHONE ENCOUNTER
PVI initially ordered March 2021  Pt was scheduled in June of 2021  Pt cnx PVI  Was seen in November by Dr Moreau, and procedure re-ordered  Pt has been called on 4 occasions by scheduling  Pt has declined scheduling multiple times  Will await further contact from the pt to set up procedure when pt is ready  3/9/2022 12:34 PM  Denise Veronica RN

## 2022-03-27 ENCOUNTER — HEALTH MAINTENANCE LETTER (OUTPATIENT)
Age: 57
End: 2022-03-27

## 2022-04-14 ENCOUNTER — TELEPHONE (OUTPATIENT)
Dept: CARDIOLOGY | Facility: CLINIC | Age: 57
End: 2022-04-14
Payer: COMMERCIAL

## 2022-04-14 DIAGNOSIS — I48.0 PAROXYSMAL ATRIAL FIBRILLATION (H): ICD-10-CM

## 2022-04-14 NOTE — TELEPHONE ENCOUNTER
Pre PVI Medication Instructions    Pt has PVI scheduled on 6/29/22 with Dr Moreau  Per PVI protocol and VORB from performing MD above pt is to start Protonix 40mg Daily 3 days prior to PVI, and continue for 6 weeks s/p. This will be discussed with pt at pre-op H&P and RX will be sent in at that time.  Pt will start Eliquis 5mg BID on 6/8, 3 wks prior to ablation per Dr Moreau  Pre-procedural letter was sent to pt via mail    PC placed to pt reviewed with pt reasoning for medication(s) pre and post PVI, dose of medication(s), administration with frequency, most common potential side effects from the medication(s), s/s to contact the clinic, RX(s) sent to pt requested pharmacy and medication list updated   30 day free and 10$ co pay card sent to pt  4/14/2022 12:01 PM  Denise Veronica RN

## 2022-06-21 ENCOUNTER — LAB (OUTPATIENT)
Dept: CARDIOLOGY | Facility: CLINIC | Age: 57
End: 2022-06-21
Payer: COMMERCIAL

## 2022-06-21 ENCOUNTER — PREP FOR PROCEDURE (OUTPATIENT)
Dept: CARDIOLOGY | Facility: CLINIC | Age: 57
End: 2022-06-21

## 2022-06-21 ENCOUNTER — ALLIED HEALTH/NURSE VISIT (OUTPATIENT)
Dept: CARDIOLOGY | Facility: CLINIC | Age: 57
End: 2022-06-21

## 2022-06-21 ENCOUNTER — OFFICE VISIT (OUTPATIENT)
Dept: CARDIOLOGY | Facility: CLINIC | Age: 57
End: 2022-06-21
Payer: COMMERCIAL

## 2022-06-21 VITALS
SYSTOLIC BLOOD PRESSURE: 98 MMHG | HEIGHT: 65 IN | HEART RATE: 68 BPM | WEIGHT: 224 LBS | RESPIRATION RATE: 16 BRPM | DIASTOLIC BLOOD PRESSURE: 62 MMHG | BODY MASS INDEX: 37.32 KG/M2

## 2022-06-21 DIAGNOSIS — I48.0 PAROXYSMAL ATRIAL FIBRILLATION (H): ICD-10-CM

## 2022-06-21 DIAGNOSIS — Z98.890 PONV (POSTOPERATIVE NAUSEA AND VOMITING): ICD-10-CM

## 2022-06-21 DIAGNOSIS — R11.2 PONV (POSTOPERATIVE NAUSEA AND VOMITING): ICD-10-CM

## 2022-06-21 DIAGNOSIS — I48.3 TYPICAL ATRIAL FLUTTER (H): ICD-10-CM

## 2022-06-21 DIAGNOSIS — I48.0 PAROXYSMAL ATRIAL FIBRILLATION (H): Primary | ICD-10-CM

## 2022-06-21 LAB
ANION GAP SERPL CALCULATED.3IONS-SCNC: 10 MMOL/L (ref 5–18)
ATRIAL RATE - MUSE: 68 BPM
BUN SERPL-MCNC: 12 MG/DL (ref 8–22)
CALCIUM SERPL-MCNC: 9.3 MG/DL (ref 8.5–10.5)
CHLORIDE BLD-SCNC: 105 MMOL/L (ref 98–107)
CO2 SERPL-SCNC: 24 MMOL/L (ref 22–31)
CREAT SERPL-MCNC: 0.84 MG/DL (ref 0.6–1.1)
DIASTOLIC BLOOD PRESSURE - MUSE: NORMAL MMHG
ERYTHROCYTE [DISTWIDTH] IN BLOOD BY AUTOMATED COUNT: 13.5 % (ref 10–15)
GFR SERPL CREATININE-BSD FRML MDRD: 82 ML/MIN/1.73M2
GLUCOSE BLD-MCNC: 100 MG/DL (ref 70–125)
HCT VFR BLD AUTO: 44 % (ref 35–47)
HGB BLD-MCNC: 14.2 G/DL (ref 11.7–15.7)
INTERPRETATION ECG - MUSE: NORMAL
MCH RBC QN AUTO: 29.9 PG (ref 26.5–33)
MCHC RBC AUTO-ENTMCNC: 32.3 G/DL (ref 31.5–36.5)
MCV RBC AUTO: 93 FL (ref 78–100)
P AXIS - MUSE: 59 DEGREES
PLATELET # BLD AUTO: 227 10E3/UL (ref 150–450)
POTASSIUM BLD-SCNC: 3.8 MMOL/L (ref 3.5–5)
PR INTERVAL - MUSE: 226 MS
QRS DURATION - MUSE: 112 MS
QT - MUSE: 436 MS
QTC - MUSE: 463 MS
R AXIS - MUSE: -18 DEGREES
RBC # BLD AUTO: 4.75 10E6/UL (ref 3.8–5.2)
SODIUM SERPL-SCNC: 139 MMOL/L (ref 136–145)
SYSTOLIC BLOOD PRESSURE - MUSE: NORMAL MMHG
T AXIS - MUSE: 15 DEGREES
VENTRICULAR RATE- MUSE: 68 BPM
WBC # BLD AUTO: 5.8 10E3/UL (ref 4–11)

## 2022-06-21 PROCEDURE — 99207 PR NO CHARGE NURSE ONLY: CPT

## 2022-06-21 PROCEDURE — 80048 BASIC METABOLIC PNL TOTAL CA: CPT

## 2022-06-21 PROCEDURE — 85027 COMPLETE CBC AUTOMATED: CPT

## 2022-06-21 PROCEDURE — 99215 OFFICE O/P EST HI 40 MIN: CPT | Performed by: NURSE PRACTITIONER

## 2022-06-21 PROCEDURE — 36415 COLL VENOUS BLD VENIPUNCTURE: CPT

## 2022-06-21 PROCEDURE — 93000 ELECTROCARDIOGRAM COMPLETE: CPT | Performed by: INTERNAL MEDICINE

## 2022-06-21 RX ORDER — LIDOCAINE 40 MG/G
CREAM TOPICAL
Status: CANCELLED | OUTPATIENT
Start: 2022-06-21

## 2022-06-21 RX ORDER — PANTOPRAZOLE SODIUM 40 MG/1
40 TABLET, DELAYED RELEASE ORAL DAILY
Qty: 45 TABLET | Refills: 0 | Status: ON HOLD | OUTPATIENT
Start: 2022-06-26 | End: 2022-06-29

## 2022-06-21 NOTE — PATIENT INSTRUCTIONS
Tania Kaufman,    It was a pleasure to see you today at the [facility].     My recommendations after this visit include:    Continue Eliquis 5 mg every 12 hours, to continue at least 3 months after ablation.  Do not take aspirin while on Eliquis.    On 6/26/2022:  Stop taking flecainide. Continue diltiazem 120 mg daily.  Start pantoprazole (Protonix) 40 mg once daily.  To continue for 6 weeks after ablation.    Ablation with Dr. Moreau on 6/29/2022  Telephone follow up with EP RN on  7/1/2022  Follow up with me 6 weeks after ablation    Florecita Cox, [facility], Electrophysiology  [phone]  EP nurses [phone]

## 2022-06-21 NOTE — H&P (VIEW-ONLY)
HEART CARE ELECTROPHYSIOLOGY NOTE      [facility]  [phone]      Assessment/Recommendations   Assessment/Plan:  Paroxysmal Atrial Fibrillation and typical appearing flutter:  Initially diagnosed February 2020, but symptom onset summer 2019.  Symptoms consist of tachypalpitations, dyspnea on exertion, lightheadedness, and fatigue.  Failed antiarrhythmic therapy with flecainide.   She is scheduled for pulmonary vein isolation ablation with Dr. Moreau on 6/29/2022.  We discussed ablation, <1% risk for complication, expected recovery, and follow-up.  Discontinue flecainide 3 days prior to ablation.  Continue diltiazem 120 mg daily.  Start pantoprazole 40 mg daily 3 days prior to ablation, to continue for 6 weeks after ablation.  She states that Her questions were answered to her satisfaction and she is ready to proceed.    She has had severe nausea/vomiting with anesthesia.    She has a EYP1SS6-EKOx score of 1 for female gender.  HAS-BLED score of 0.   Eliquis 5 mg twice daily started on 6/8/2022 in preparation for ablation, to continue for at least 3 months post ablation.  She reports no missed doses or bleeding issues.      Telephone follow up with EP RN on 7/1/2022  Follow-up with me 6 weeks post PVI     History of Present Illness/Subjective    HPI: Tnaia Kaufman is a 56 year old female who comes in for EP follow up of atrial fibrillation and history and physical prior to pulmonary vein isolation ablation.  She has a history of atrial fibrillation, anxiety, diverticulitis, IBS, and recurrent kidney stones.      She was diagnosed with atrial fibrillation in February 2020, though began having palpitations in the summer 2019.  Symptoms consist of tachypalpitations, dyspnea on exertion, and lightheadedness.  Episodes were occurring every 1 to 2 months, but increased in frequency and duration, occurring almost daily and lasting up to 6 hours.   Echo shows normal structure and function.  Event  "monitor documented atrial fibrillation with rapid ventricular response.   She was started on low-dose metoprolol succinate and Eliquis on 2/21/2020 with subsequent decrease in AF frequency and symptom severity, but with hypotension.  Metoprolol was switched to diltiazem.   She again had an increase in AF frequency.  Nuclear stress test was negative for ischemia.  She was started on low-dose flecainide, but continued to have frequent episodes of A. fib, so flecainide was increased to 100 mg twice daily. She was on low-dose daily aspirin for stroke prophylaxis, but switched to Eliquis on 6/8/2022 in preparation for ablation.     Eden states that she feels well.  She has not had any significant episodes of A. fib in almost 2 months, but had a \"bad one\" recently.  She denies chest discomfort, abdominal fullness/bloating or peripheral edema, shortness of breath, paroxysmal nocturnal dyspnea, orthopnea, lightheadedness, pre-syncope, or syncope.       Cardiographics (EKG personally reviewed):  EKG done 6/21/2022 shows sinus rhythm at 68 bpm, first-degree AV block,  ms, QT/QTc 436/463 ms  EKG done 10/6/2021 shows typical atrial flutter with variable block 94 bpm     Event monitor worn for 1 week beginning on 2/17/2020 shows paroxysmal atrial fibrillation with rapid ventricular response 105-160 bpm.  Symptoms noted for all episodes of A. fib.  AF burden approximately 6%.  No significant bradycardia or pauses.  No significant ventricular arrhythmia.     ECHO done 2/24/2020:    Left ventricle ejection fraction is normal. The calculated left ventricular ejection fraction is 58%.    The right ventricle is mildly dilated. The systolic function is mildly reduced.    No hemodynamically significant valvular heart abnormalities.    No previous study for comparison.     NM stress test done December 21, 2020:     There is no prior study for comparison.     The nuclear stress test is negative for inducible myocardial ischemia or " infarction.     The patient is at a low risk of future cardiac ischemic events.     The left ventricular ejection fraction at rest is greater than 70%.     The patient's exercise capacity is average.  No chest pain with activity.     Left ventricular function is normal.     Stress electrocardiogram was negative for inducible ischemia.    MRA Pulmonary Veins done 4/21/2021: EF 55%    I have reviewed and updated the patient's Past Medical History, Social History, Family History and Medication List.  Outside records personally reviewed.     Physical Examination  Review of Systems   Vitals: There were no vitals taken for this visit.  BMI= There is no height or weight on file to calculate BMI.  Wt Readings from Last 3 Encounters:   11/09/21 95.7 kg (211 lb)   10/06/21 95.6 kg (210 lb 12.8 oz)   06/22/21 97.7 kg (215 lb 6.4 oz)       General Appearance:   Alert, well-appearing and in no acute distress.   HEENT: Atraumatic, normocephalic.  No scleral icterus, normal conjunctivae, EOMs intact, PERRL.  Wearing mask.   Chest/Lungs:   Chest symmetric, spine straight.  Respirations unlabored.  Lungs are clear to auscultation.   Cardiovascular:   Regular rate and rhythm.  Normal first and second heart sounds with no murmurs, rubs, or gallops; radial and posterior tibial pulses are intact, No edema.   Abdomen:  Soft, nondistended, bowel sounds present.   Extremities: No cyanosis or clubbing.   Musculoskeletal: Moves all extremities.     Skin: Warm, dry, intact.    Neurologic: Mood and affect are appropriate.  Alert and oriented to person, place, time, and situation.     ROS: 10 point ROS neg other than the symptoms noted above in the HPI.         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      PONV (postoperative nausea and vomiting)      PONV (postoperative nausea and vomiting)      Past Surgical History:   Procedure Laterality Date     NO PAST SURGERIES       OTHER SURGICAL HISTORY      wisdom teeth removal     MD CYSTO/URETERO W/LITHOTRIPSY &INDWELL STENT INSRT Right 6/11/2019    Procedure: CYSTOSCOPY, RIGHT RETROGRADE,  RIGHT URETEROSCOPY WITH LASER LITHOTRIPSY AND STENT INSERTION;  Surgeon: John Gutierrez MD;  Location: [facility];  Service: Urology     Family History   Problem Relation Age of Onset     Cancer Mother         skin     Pacemaker Mother         4 open heart surgeries      Diverticulitis Father      Breast Cancer Other 40.00        great aunt     Urolithiasis Brother         recurrent     Gout No family hx of         Social History     Socioeconomic History     Marital status:      Spouse name: Not on file     Number of children: Not on file     Years of education: Not on file     Highest education level: Not on file   Occupational History     Not on file   Tobacco Use     Smoking status: Never Smoker     Smokeless tobacco: Never Used   Substance and Sexual Activity     Alcohol use: Yes     Comment: Alcoholic Drinks/day: social only     Drug use: No     Sexual activity: Not on file   Other Topics Concern     Not on file   Social History Narrative     Not on file     Social Determinants of Health     Financial Resource Strain: Not on file   Food Insecurity: Not on file   Transportation Needs: Not on file   Physical Activity: Not on file   Stress: Not on file   Social Connections: Not on file   Intimate Partner Violence: Not on file   Housing Stability: Not on file            Medications  Allergies   Current Outpatient Medications   Medication Sig Dispense Refill     apixaban ANTICOAGULANT (ELIQUIS) 5 MG tablet Take 1 tablet (5 mg) by mouth 2 times daily 180 tablet 1     aspirin 81 MG EC tablet [ASPIRIN 81 MG EC TABLET] Take 1 tablet (81 mg total) by mouth daily.  0     co-enzyme Q-10 30 mg capsule [CO-ENZYME Q-10 30 MG CAPSULE] Take 30 mg by mouth daily.       diltiazem ER COATED BEADS (CARDIZEM CD/CARTIA XT) 120 MG 24 hr capsule Take 1 capsule (120 mg) by mouth daily 90 capsule 3     flecainide (TAMBOCOR) 150 MG tablet TAKE 1 TABLET(150 MG) BY MOUTH TWICE DAILY 180 tablet 1     GLUCOSAMINE SULFATE (GLUCOSAMINE ORAL) Take 1 tablet by mouth daily        L.ACID/L.CASEI/B.BIF/B.LUKE/FOS (PROBIOTIC BLEND ORAL) Take 1 tablet by mouth 2 times daily as needed        melatonin 3 mg Tab tablet [MELATONIN 3 MG TAB TABLET] Take 3 mg by mouth bedtime as needed.       multivitamin therapeutic (THERAGRAN) tablet [MULTIVITAMIN THERAPEUTIC (THERAGRAN) TABLET] Take 1 tablet by mouth daily.       NON FORMULARY daily vitamins       No Known Allergies     Severe nausea and vomiting with anesthesia      Lab Results    Chemistry/lipid CBC Cardiac Enzymes/BNP/TSH/INR   Recent Labs   Lab Test 09/11/14  0821   CHOL 178   HDL 75   LDL 87   TRIG 82     Recent Labs   Lab Test 09/11/14  0821   LDL 87     Recent Labs   Lab Test 06/22/21  1501      POTASSIUM 4.3   CHLORIDE 105   CO2 24      BUN 11   CR 0.80   GFRESTIMATED >60   INDIA 9.7     Recent Labs   Lab Test 06/22/21  1501 02/20/20  0823 07/03/19  2355   CR 0.80 0.88 1.01     Recent Labs   Lab Test 02/20/20  0823   A1C 5.4     BMP and CBC drawn today, results pending.   Recent Labs   Lab Test 06/22/21  1501   WBC 9.4   HGB 14.7   HCT 44.4   MCV 90        Recent Labs   Lab Test 06/22/21  1501 02/20/20  0823 07/03/19  2355   HGB 14.7 13.1 13.1    No results for input(s): TROPONINI in the last 91492 hours.  No results for input(s): BNP,  NTBNPI, NTBNP in the last 68513 hours.  Recent Labs   Lab Test 02/20/20  0823   TSH 4.52     No results for input(s): INR in the last 94800 hours.   54 minutes were spent on the date of encounter performing chart review, history and exam, documentation, and further activities as noted above.

## 2022-06-21 NOTE — PROGRESS NOTES
Pulmonary Vein Isolation Ablation Patient Education Visit:    Patient present for discussion.  Education completed Face to Face in the clinic on 6/21/2022 with Hero Marvin RN.  Please refer to documented H&P completed by EP provider in [facility] associated to this visit.    Procedural Discussion:  Reviewed pre and post op PVI procedure with pt, pre-procedure letter reviewed and given to pt- see letter in EPIC under documentation, see additional EP PVI prep note for details on procedure/prep and answered pt questions and concerns regarding procedure    Medication Discussion:  Anticoagulation- Reviewed importance of continuing anticoagulation uninterrupted pre and post ablation and pt denies missing any doses of their anticoagulant   Pt was instructed to start Eliquis 5 mg twice daily, 3 weeks prior to her PVI starting on Wednesday 6-8-22. Pt confirmed she did start Eliquis on that date 6-8-22.    PPI- instructions given to start Protonix 40mg Daily 3 days prior to PVI, to continue for 6 weeks post PVI then stop, and RX was sent to requested pharmacy   Pt instructed to start Protonix 40 mg daily on Sunday 6-26-22.    Antiarrythmic- instructions given to pt to stop Flecainide 3 days prior to PVI starting on Sunday 6-26-22. Pt instructed to take her last dose of Flecainide on the evening of Saturday 6-25-22.    Pt also requested to her previously scheduled pre-operative Covid test in [place] on Monday 6-27-22 canceled and she will do a home Covid test instead on Monday 6-27-22. Scheduled pre-operative Covid test in [place] canceled.    6/21/2022 3:26 PM  Hero Marvin RN

## 2022-06-21 NOTE — PROGRESS NOTES
HEART CARE ELECTROPHYSIOLOGY NOTE      [facility]  [phone]      Assessment/Recommendations   Assessment/Plan:  Paroxysmal Atrial Fibrillation and typical appearing flutter:  Initially diagnosed February 2020, but symptom onset summer 2019.  Symptoms consist of tachypalpitations, dyspnea on exertion, lightheadedness, and fatigue.  Failed antiarrhythmic therapy with flecainide.   She is scheduled for pulmonary vein isolation ablation with Dr. Moreau on 6/29/2022.  We discussed ablation, <1% risk for complication, expected recovery, and follow-up.  Discontinue flecainide 3 days prior to ablation.  Continue diltiazem 120 mg daily.  Start pantoprazole 40 mg daily 3 days prior to ablation, to continue for 6 weeks after ablation.  She states that Her questions were answered to her satisfaction and she is ready to proceed.    She has had severe nausea/vomiting with anesthesia.    She has a TIA2YM3-CAFd score of 1 for female gender.  HAS-BLED score of 0.   Eliquis 5 mg twice daily started on 6/8/2022 in preparation for ablation, to continue for at least 3 months post ablation.  She reports no missed doses or bleeding issues.      Telephone follow up with EP RN on 7/1/2022  Follow-up with me 6 weeks post PVI     History of Present Illness/Subjective    HPI: Tania Kaufman is a 56 year old female who comes in for EP follow up of atrial fibrillation and history and physical prior to pulmonary vein isolation ablation.  She has a history of atrial fibrillation, anxiety, diverticulitis, IBS, and recurrent kidney stones.      She was diagnosed with atrial fibrillation in February 2020, though began having palpitations in the summer 2019.  Symptoms consist of tachypalpitations, dyspnea on exertion, and lightheadedness.  Episodes were occurring every 1 to 2 months, but increased in frequency and duration, occurring almost daily and lasting up to 6 hours.   Echo shows normal structure and function.  Event  "monitor documented atrial fibrillation with rapid ventricular response.   She was started on low-dose metoprolol succinate and Eliquis on 2/21/2020 with subsequent decrease in AF frequency and symptom severity, but with hypotension.  Metoprolol was switched to diltiazem.   She again had an increase in AF frequency.  Nuclear stress test was negative for ischemia.  She was started on low-dose flecainide, but continued to have frequent episodes of A. fib, so flecainide was increased to 100 mg twice daily. She was on low-dose daily aspirin for stroke prophylaxis, but switched to Eliquis on 6/8/2022 in preparation for ablation.     Eden states that she feels well.  She has not had any significant episodes of A. fib in almost 2 months, but had a \"bad one\" recently.  She denies chest discomfort, abdominal fullness/bloating or peripheral edema, shortness of breath, paroxysmal nocturnal dyspnea, orthopnea, lightheadedness, pre-syncope, or syncope.       Cardiographics (EKG personally reviewed):  EKG done 6/21/2022 shows sinus rhythm at 68 bpm, first-degree AV block,  ms, QT/QTc 436/463 ms  EKG done 10/6/2021 shows typical atrial flutter with variable block 94 bpm     Event monitor worn for 1 week beginning on 2/17/2020 shows paroxysmal atrial fibrillation with rapid ventricular response 105-160 bpm.  Symptoms noted for all episodes of A. fib.  AF burden approximately 6%.  No significant bradycardia or pauses.  No significant ventricular arrhythmia.     ECHO done 2/24/2020:    Left ventricle ejection fraction is normal. The calculated left ventricular ejection fraction is 58%.    The right ventricle is mildly dilated. The systolic function is mildly reduced.    No hemodynamically significant valvular heart abnormalities.    No previous study for comparison.     NM stress test done December 21, 2020:     There is no prior study for comparison.     The nuclear stress test is negative for inducible myocardial ischemia or " infarction.     The patient is at a low risk of future cardiac ischemic events.     The left ventricular ejection fraction at rest is greater than 70%.     The patient's exercise capacity is average.  No chest pain with activity.     Left ventricular function is normal.     Stress electrocardiogram was negative for inducible ischemia.    MRA Pulmonary Veins done 4/21/2021: EF 55%    I have reviewed and updated the patient's Past Medical History, Social History, Family History and Medication List.  Outside records personally reviewed.     Physical Examination  Review of Systems   Vitals: There were no vitals taken for this visit.  BMI= There is no height or weight on file to calculate BMI.  Wt Readings from Last 3 Encounters:   11/09/21 95.7 kg (211 lb)   10/06/21 95.6 kg (210 lb 12.8 oz)   06/22/21 97.7 kg (215 lb 6.4 oz)       General Appearance:   Alert, well-appearing and in no acute distress.   HEENT: Atraumatic, normocephalic.  No scleral icterus, normal conjunctivae, EOMs intact, PERRL.  Wearing mask.   Chest/Lungs:   Chest symmetric, spine straight.  Respirations unlabored.  Lungs are clear to auscultation.   Cardiovascular:   Regular rate and rhythm.  Normal first and second heart sounds with no murmurs, rubs, or gallops; radial and posterior tibial pulses are intact, No edema.   Abdomen:  Soft, nondistended, bowel sounds present.   Extremities: No cyanosis or clubbing.   Musculoskeletal: Moves all extremities.     Skin: Warm, dry, intact.    Neurologic: Mood and affect are appropriate.  Alert and oriented to person, place, time, and situation.     ROS: 10 point ROS neg other than the symptoms noted above in the HPI.         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      PONV (postoperative nausea and vomiting)      PONV (postoperative nausea and vomiting)      Past Surgical History:   Procedure Laterality Date     NO PAST SURGERIES       OTHER SURGICAL HISTORY      wisdom teeth removal     DE CYSTO/URETERO W/LITHOTRIPSY &INDWELL STENT INSRT Right 6/11/2019    Procedure: CYSTOSCOPY, RIGHT RETROGRADE,  RIGHT URETEROSCOPY WITH LASER LITHOTRIPSY AND STENT INSERTION;  Surgeon: John Gutierrez MD;  Location: [facility];  Service: Urology     Family History   Problem Relation Age of Onset     Cancer Mother         skin     Pacemaker Mother         4 open heart surgeries      Diverticulitis Father      Breast Cancer Other 40.00        great aunt     Urolithiasis Brother         recurrent     Gout No family hx of         Social History     Socioeconomic History     Marital status:      Spouse name: Not on file     Number of children: Not on file     Years of education: Not on file     Highest education level: Not on file   Occupational History     Not on file   Tobacco Use     Smoking status: Never Smoker     Smokeless tobacco: Never Used   Substance and Sexual Activity     Alcohol use: Yes     Comment: Alcoholic Drinks/day: social only     Drug use: No     Sexual activity: Not on file   Other Topics Concern     Not on file   Social History Narrative     Not on file     Social Determinants of Health     Financial Resource Strain: Not on file   Food Insecurity: Not on file   Transportation Needs: Not on file   Physical Activity: Not on file   Stress: Not on file   Social Connections: Not on file   Intimate Partner Violence: Not on file   Housing Stability: Not on file            Medications  Allergies   Current Outpatient Medications   Medication Sig Dispense Refill     apixaban ANTICOAGULANT (ELIQUIS) 5 MG tablet Take 1 tablet (5 mg) by mouth 2 times daily 180 tablet 1     aspirin 81 MG EC tablet [ASPIRIN 81 MG EC TABLET] Take 1 tablet (81 mg total) by mouth daily.  0     co-enzyme Q-10 30 mg capsule [CO-ENZYME Q-10 30 MG CAPSULE] Take 30 mg by mouth daily.       diltiazem ER COATED BEADS (CARDIZEM CD/CARTIA XT) 120 MG 24 hr capsule Take 1 capsule (120 mg) by mouth daily 90 capsule 3     flecainide (TAMBOCOR) 150 MG tablet TAKE 1 TABLET(150 MG) BY MOUTH TWICE DAILY 180 tablet 1     GLUCOSAMINE SULFATE (GLUCOSAMINE ORAL) Take 1 tablet by mouth daily        L.ACID/L.CASEI/B.BIF/B.LUKE/FOS (PROBIOTIC BLEND ORAL) Take 1 tablet by mouth 2 times daily as needed        melatonin 3 mg Tab tablet [MELATONIN 3 MG TAB TABLET] Take 3 mg by mouth bedtime as needed.       multivitamin therapeutic (THERAGRAN) tablet [MULTIVITAMIN THERAPEUTIC (THERAGRAN) TABLET] Take 1 tablet by mouth daily.       NON FORMULARY daily vitamins       No Known Allergies     Severe nausea and vomiting with anesthesia      Lab Results    Chemistry/lipid CBC Cardiac Enzymes/BNP/TSH/INR   Recent Labs   Lab Test 09/11/14  0821   CHOL 178   HDL 75   LDL 87   TRIG 82     Recent Labs   Lab Test 09/11/14  0821   LDL 87     Recent Labs   Lab Test 06/22/21  1501      POTASSIUM 4.3   CHLORIDE 105   CO2 24      BUN 11   CR 0.80   GFRESTIMATED >60   INDIA 9.7     Recent Labs   Lab Test 06/22/21  1501 02/20/20  0823 07/03/19  2355   CR 0.80 0.88 1.01     Recent Labs   Lab Test 02/20/20  0823   A1C 5.4     BMP and CBC drawn today, results pending.   Recent Labs   Lab Test 06/22/21  1501   WBC 9.4   HGB 14.7   HCT 44.4   MCV 90        Recent Labs   Lab Test 06/22/21  1501 02/20/20  0823 07/03/19  2355   HGB 14.7 13.1 13.1    No results for input(s): TROPONINI in the last 22113 hours.  No results for input(s): BNP,  NTBNPI, NTBNP in the last 58504 hours.  Recent Labs   Lab Test 02/20/20  0823   TSH 4.52     No results for input(s): INR in the last 63918 hours.   54 minutes were spent on the date of encounter performing chart review, history and exam, documentation, and further activities as noted above.

## 2022-06-21 NOTE — LETTER
6/21/2022    Lashonda Arango MD  [address] [facility] [address]    RE: Tania Kaufman       Dear Colleague,     I had the pleasure of seeing Tania Kaufman in the [facility].    HEART CARE ELECTROPHYSIOLOGY NOTE      [facility]  [phone]      Assessment/Recommendations   Assessment/Plan:  Paroxysmal Atrial Fibrillation and typical appearing flutter:  Initially diagnosed February 2020, but symptom onset summer 2019.  Symptoms consist of tachypalpitations, dyspnea on exertion, lightheadedness, and fatigue.  Failed antiarrhythmic therapy with flecainide.   She is scheduled for pulmonary vein isolation ablation with Dr. Moreau on 6/29/2022.  We discussed ablation, <1% risk for complication, expected recovery, and follow-up.  Discontinue flecainide 3 days prior to ablation.  Continue diltiazem 120 mg daily.  Start pantoprazole 40 mg daily 3 days prior to ablation, to continue for 6 weeks after ablation.  She states that Her questions were answered to her satisfaction and she is ready to proceed.    She has had severe nausea/vomiting with anesthesia.    She has a AVX2PR5-BHCb score of 1 for female gender.  HAS-BLED score of 0.   Eliquis 5 mg twice daily started on 6/8/2022 in preparation for ablation, to continue for at least 3 months post ablation.  She reports no missed doses or bleeding issues.      Telephone follow up with EP RN on 7/1/2022  Follow-up with me 6 weeks post PVI     History of Present Illness/Subjective    HPI: Tania Kaufman is a 56 year old female who comes in for EP follow up of atrial fibrillation and history and physical prior to pulmonary vein isolation ablation.  She has a history of atrial fibrillation, anxiety, diverticulitis, IBS, and recurrent kidney stones.      She was diagnosed with atrial fibrillation in February 2020, though began having palpitations in the summer 2019.  Symptoms consist of tachypalpitations, dyspnea on  "exertion, and lightheadedness.  Episodes were occurring every 1 to 2 months, but increased in frequency and duration, occurring almost daily and lasting up to 6 hours.   Echo shows normal structure and function.  Event monitor documented atrial fibrillation with rapid ventricular response.   She was started on low-dose metoprolol succinate and Eliquis on 2/21/2020 with subsequent decrease in AF frequency and symptom severity, but with hypotension.  Metoprolol was switched to diltiazem.   She again had an increase in AF frequency.  Nuclear stress test was negative for ischemia.  She was started on low-dose flecainide, but continued to have frequent episodes of A. fib, so flecainide was increased to 100 mg twice daily. She was on low-dose daily aspirin for stroke prophylaxis, but switched to Eliquis on 6/8/2022 in preparation for ablation.     Eden states that she feels well.  She has not had any significant episodes of A. fib in almost 2 months, but had a \"bad one\" recently.  She denies chest discomfort, abdominal fullness/bloating or peripheral edema, shortness of breath, paroxysmal nocturnal dyspnea, orthopnea, lightheadedness, pre-syncope, or syncope.       Cardiographics (EKG personally reviewed):  EKG done 6/21/2022 shows sinus rhythm at 68 bpm, first-degree AV block,  ms, QT/QTc 436/463 ms  EKG done 10/6/2021 shows typical atrial flutter with variable block 94 bpm     Event monitor worn for 1 week beginning on 2/17/2020 shows paroxysmal atrial fibrillation with rapid ventricular response 105-160 bpm.  Symptoms noted for all episodes of A. fib.  AF burden approximately 6%.  No significant bradycardia or pauses.  No significant ventricular arrhythmia.     ECHO done 2/24/2020:    Left ventricle ejection fraction is normal. The calculated left ventricular ejection fraction is 58%.    The right ventricle is mildly dilated. The systolic function is mildly reduced.    No hemodynamically significant valvular " heart abnormalities.    No previous study for comparison.     NM stress test done December 21, 2020:     There is no prior study for comparison.     The nuclear stress test is negative for inducible myocardial ischemia or infarction.     The patient is at a low risk of future cardiac ischemic events.     The left ventricular ejection fraction at rest is greater than 70%.     The patient's exercise capacity is average.  No chest pain with activity.     Left ventricular function is normal.     Stress electrocardiogram was negative for inducible ischemia.    MRA Pulmonary Veins done 4/21/2021: EF 55%    I have reviewed and updated the patient's Past Medical History, Social History, Family History and Medication List.  Outside records personally reviewed.     Physical Examination  Review of Systems   Vitals: There were no vitals taken for this visit.  BMI= There is no height or weight on file to calculate BMI.  Wt Readings from Last 3 Encounters:   11/09/21 95.7 kg (211 lb)   10/06/21 95.6 kg (210 lb 12.8 oz)   06/22/21 97.7 kg (215 lb 6.4 oz)       General Appearance:   Alert, well-appearing and in no acute distress.   HEENT: Atraumatic, normocephalic.  No scleral icterus, normal conjunctivae, EOMs intact, PERRL.  Wearing mask.   Chest/Lungs:   Chest symmetric, spine straight.  Respirations unlabored.  Lungs are clear to auscultation.   Cardiovascular:   Regular rate and rhythm.  Normal first and second heart sounds with no murmurs, rubs, or gallops; radial and posterior tibial pulses are intact, No edema.   Abdomen:  Soft, nondistended, bowel sounds present.   Extremities: No cyanosis or clubbing.   Musculoskeletal: Moves all extremities.     Skin: Warm, dry, intact.    Neurologic: Mood and affect are appropriate.  Alert and oriented to person, place, time, and situation.     ROS: 10 point ROS neg other than the symptoms noted above in the HPI.         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      PONV (postoperative nausea and vomiting)      PONV (postoperative nausea and vomiting)      Past Surgical History:   Procedure Laterality Date     NO PAST SURGERIES       OTHER SURGICAL HISTORY      wisdom teeth removal     [place] CYSTO/URETERO W/LITHOTRIPSY &INDWELL STENT INSRT Right 6/11/2019    Procedure: CYSTOSCOPY, RIGHT RETROGRADE,  RIGHT URETEROSCOPY WITH LASER LITHOTRIPSY AND STENT INSERTION;  Surgeon: John Gutierrez MD;  Location: [facility];  Service: Urology     Family History   Problem Relation Age of Onset     Cancer Mother         skin     Pacemaker Mother         4 open heart surgeries      Diverticulitis Father      Breast Cancer Other 40.00        great aunt     Urolithiasis Brother         recurrent     Gout No family hx of         Social History     Socioeconomic History     Marital status:      Spouse name: Not on file     Number of children: Not on file     Years of education: Not on file     Highest education level: Not on file   Occupational History     Not on file   Tobacco Use     Smoking status: Never Smoker     Smokeless tobacco: Never Used   Substance and Sexual Activity     Alcohol use: Yes     Comment: Alcoholic Drinks/day: social only     Drug use: No     Sexual activity: Not on file   Other Topics Concern     Not on file   Social History Narrative     Not on file     Social Determinants of Health     Financial Resource Strain: Not on file   Food  Insecurity: Not on file   Transportation Needs: Not on file   Physical Activity: Not on file   Stress: Not on file   Social Connections: Not on file   Intimate Partner Violence: Not on file   Housing Stability: Not on file           Medications  Allergies   Current Outpatient Medications   Medication Sig Dispense Refill     apixaban ANTICOAGULANT (ELIQUIS) 5 MG tablet Take 1 tablet (5 mg) by mouth 2 times daily 180 tablet 1     aspirin 81 MG EC tablet [ASPIRIN 81 MG EC TABLET] Take 1 tablet (81 mg total) by mouth daily.  0     co-enzyme Q-10 30 mg capsule [CO-ENZYME Q-10 30 MG CAPSULE] Take 30 mg by mouth daily.       diltiazem ER COATED BEADS (CARDIZEM CD/CARTIA XT) 120 MG 24 hr capsule Take 1 capsule (120 mg) by mouth daily 90 capsule 3     flecainide (TAMBOCOR) 150 MG tablet TAKE 1 TABLET(150 MG) BY MOUTH TWICE DAILY 180 tablet 1     GLUCOSAMINE SULFATE (GLUCOSAMINE ORAL) Take 1 tablet by mouth daily        L.ACID/L.CASEI/B.BIF/B.LUKE/FOS (PROBIOTIC BLEND ORAL) Take 1 tablet by mouth 2 times daily as needed        melatonin 3 mg Tab tablet [MELATONIN 3 MG TAB TABLET] Take 3 mg by mouth bedtime as needed.       multivitamin therapeutic (THERAGRAN) tablet [MULTIVITAMIN THERAPEUTIC (THERAGRAN) TABLET] Take 1 tablet by mouth daily.       NON FORMULARY daily vitamins       No Known Allergies     Severe nausea and vomiting with anesthesia      Lab Results    Chemistry/lipid CBC Cardiac Enzymes/BNP/TSH/INR   Recent Labs   Lab Test 09/11/14  0821   CHOL 178   HDL 75   LDL 87   TRIG 82     Recent Labs   Lab Test 09/11/14  0821   LDL 87     Recent Labs   Lab Test 06/22/21  1501      POTASSIUM 4.3   CHLORIDE 105   CO2 24      BUN 11   CR 0.80   GFRESTIMATED >60   INDIA 9.7     Recent Labs   Lab Test 06/22/21  1501 02/20/20  0823 07/03/19  2355   CR 0.80 0.88 1.01     Recent Labs   Lab Test 02/20/20  0823   A1C 5.4     BMP and CBC drawn today, results pending.   Recent Labs   Lab Test 06/22/21  1501   WBC 9.4   HGB  14.7   HCT 44.4   MCV 90        Recent Labs   Lab Test 06/22/21  1501 02/20/20  0823 07/03/19  2355   HGB 14.7 13.1 13.1    No results for input(s): TROPONINI in the last 95522 hours.  No results for input(s): BNP, NTBNPI, NTBNP in the last 42080 hours.  Recent Labs   Lab Test 02/20/20  0823   TSH 4.52     No results for input(s): INR in the last 57410 hours.   54 minutes were spent on the date of encounter performing chart review, history and exam, documentation, and further activities as noted above.            Thank you for allowing me to participate in the care of your patient.      Sincerely,     [facility]  cc:   No referring provider defined for this encounter.

## 2022-06-26 ENCOUNTER — TELEPHONE (OUTPATIENT)
Dept: NURSING | Facility: CLINIC | Age: 57
End: 2022-06-26

## 2022-06-26 NOTE — TELEPHONE ENCOUNTER
Outreach to patient to discuss COVID testing for upcoming procedure.     Spoke with: patient     Patient will complete testing outside of [facility]. No additional needs at this time.  Home test planned    Tania Stevenson LPN

## 2022-06-28 ENCOUNTER — ANESTHESIA EVENT (OUTPATIENT)
Dept: CARDIOLOGY | Facility: CLINIC | Age: 57
End: 2022-06-28
Payer: COMMERCIAL

## 2022-06-28 ASSESSMENT — LIFESTYLE VARIABLES: TOBACCO_USE: 0

## 2022-06-28 ASSESSMENT — ENCOUNTER SYMPTOMS: DYSRHYTHMIAS: 1

## 2022-06-28 NOTE — ANESTHESIA PREPROCEDURE EVALUATION
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      PONV (postoperative nausea and vomiting)      PONV (postoperative nausea and vomiting)       Past Surgical History:   Procedure Laterality Date     NO PAST SURGERIES       OTHER SURGICAL HISTORY      wisdom teeth removal     GA CYSTO/URETERO W/LITHOTRIPSY &INDWELL STENT INSRT Right 2019    Procedure: CYSTOSCOPY, RIGHT RETROGRADE,  RIGHT URETEROSCOPY WITH LASER LITHOTRIPSY AND STENT INSERTION;  Surgeon: John Gutierrez MD;  Location: [facility];  Service: Urology      No Known Allergies   Social History     Tobacco Use     Smoking status: Never Smoker     Smokeless tobacco: Never Used   Substance Use Topics     Alcohol use: Yes     Comment: Alcoholic Drinks/day: social only      Wt Readings from Last 1 Encounters:   22 101.6 kg (224 lb)        Anesthesia Evaluation   Pt has had prior anesthetic.     History of anesthetic complications  - PONV.      ROS/MED HX  ENT/Pulmonary:    (-) tobacco use and sleep apnea   Neurologic:       Cardiovascular:     (+) -----dysrhythmias, a-fib and a-flutter,     METS/Exercise Tolerance:     Hematologic:       Musculoskeletal:       GI/Hepatic: Comment: IBS, Diverticulitis      Renal/Genitourinary:     (+) Nephrolithiasis ,     Endo:       Psychiatric/Substance Use:     (+)  psychiatric history anxiety     Infectious Disease:       Malignancy:       Other:            Physical Exam    Airway        Mallampati: II   TM distance: > 3 FB   Neck ROM: full   Mouth opening: > 3 cm    Respiratory Devices and Support         Dental  no notable dental history         Cardiovascular          Rhythm and rate: irregular     Pulmonary   pulmonary exam normal                OUTSIDE LABS:  CBC:   Lab Results   Component Value Date    WBC 5.8 06/21/2022    WBC 9.4 06/22/2021    HGB 14.2 06/21/2022    HGB 14.7 06/22/2021    HCT 44.0 06/21/2022    HCT 44.4 06/22/2021     06/21/2022     06/22/2021     BMP:   Lab Results   Component Value Date     06/21/2022     06/22/2021    POTASSIUM 3.8 06/21/2022    POTASSIUM 4.3 06/22/2021    CHLORIDE 105 06/21/2022    CHLORIDE 105 06/22/2021    CO2 24 06/21/2022    CO2 24 06/22/2021    BUN 12 06/21/2022    BUN 11 06/22/2021    CR 0.84 06/21/2022    CR 0.80 06/22/2021     06/21/2022     06/22/2021     COAGS: No results found for: PTT, INR, FIBR  POC:   Lab Results   Component Value Date    HCG Negative 06/11/2019     HEPATIC:   Lab Results   Component Value Date    ALBUMIN 3.7 06/03/2019    PROTTOTAL 7.2 06/03/2019    ALT 23 06/03/2019    AST 17 06/03/2019    ALKPHOS 160 (H) 06/03/2019    BILITOTAL 0.3 06/03/2019     OTHER:   Lab Results   Component Value Date    A1C 5.4 02/20/2020    INDIA 9.3 06/21/2022    LIPASE 125 (H) 06/03/2019    TSH 4.52 02/20/2020       Anesthesia Plan    ASA Status:  2   NPO Status:  NPO Appropriate    Anesthesia Type: General.     - Airway: ETT   Induction: Intravenous, Propofol.   Maintenance: TIVA.   Techniques and Equipment:     - Airway: Video-Laryngoscope         Consents    Anesthesia Plan(s) and associated risks, benefits, and realistic alternatives discussed. Questions answered and patient/representative(s) expressed understanding.    - Discussed:     - Discussed with:  Patient          Postoperative Care    Pain management: IV analgesics, Oral pain medications.   PONV prophylaxis: Ondansetron (or other 5HT-3), Dexamethasone or Solumedrol, Scopolamine patch, Aprepitant     Comments:                Luis Jones MD

## 2022-06-29 ENCOUNTER — HOSPITAL ENCOUNTER (OUTPATIENT)
Facility: CLINIC | Age: 57
Setting detail: OBSERVATION
Discharge: HOME | End: 2022-06-29
Attending: INTERNAL MEDICINE | Admitting: INTERNAL MEDICINE
Payer: COMMERCIAL

## 2022-06-29 ENCOUNTER — APPOINTMENT (OUTPATIENT)
Dept: LAB | Facility: CLINIC | Age: 57
End: 2022-06-29
Attending: INTERNAL MEDICINE
Payer: COMMERCIAL

## 2022-06-29 ENCOUNTER — ANESTHESIA (OUTPATIENT)
Dept: CARDIOLOGY | Facility: CLINIC | Age: 57
End: 2022-06-29
Payer: COMMERCIAL

## 2022-06-29 VITALS
TEMPERATURE: 97.8 F | HEIGHT: 65 IN | HEART RATE: 75 BPM | DIASTOLIC BLOOD PRESSURE: 61 MMHG | RESPIRATION RATE: 16 BRPM | SYSTOLIC BLOOD PRESSURE: 125 MMHG | WEIGHT: 220.5 LBS | BODY MASS INDEX: 36.74 KG/M2 | OXYGEN SATURATION: 99 %

## 2022-06-29 DIAGNOSIS — I48.0 PAROXYSMAL ATRIAL FIBRILLATION (H): ICD-10-CM

## 2022-06-29 LAB
ABO/RH(D): NORMAL
ACT BLD: 535 SECONDS (ref 74–150)
ACT BLD: 539 SECONDS (ref 74–150)
ACT BLD: 573 SECONDS (ref 74–150)
ANION GAP SERPL CALCULATED.3IONS-SCNC: 3 MMOL/L (ref 3–14)
ANTIBODY SCREEN: NEGATIVE
B-HCG SERPL-ACNC: 3 IU/L (ref 0–5)
BUN SERPL-MCNC: 10 MG/DL (ref 7–30)
CALCIUM SERPL-MCNC: 8.9 MG/DL (ref 8.5–10.1)
CHLORIDE BLD-SCNC: 108 MMOL/L (ref 94–109)
CO2 SERPL-SCNC: 26 MMOL/L (ref 20–32)
CREAT SERPL-MCNC: 0.94 MG/DL (ref 0.52–1.04)
ERYTHROCYTE [DISTWIDTH] IN BLOOD BY AUTOMATED COUNT: 13.4 % (ref 10–15)
GFR SERPL CREATININE-BSD FRML MDRD: 72 ML/MIN/1.73M2
GLUCOSE BLD-MCNC: 110 MG/DL (ref 70–99)
HCT VFR BLD AUTO: 43.3 % (ref 35–47)
HGB BLD-MCNC: 14 G/DL (ref 11.7–15.7)
MCH RBC QN AUTO: 30 PG (ref 26.5–33)
MCHC RBC AUTO-ENTMCNC: 32.3 G/DL (ref 31.5–36.5)
MCV RBC AUTO: 93 FL (ref 78–100)
PLATELET # BLD AUTO: 222 10E3/UL (ref 150–450)
POTASSIUM BLD-SCNC: 4 MMOL/L (ref 3.4–5.3)
RBC # BLD AUTO: 4.67 10E6/UL (ref 3.8–5.2)
SODIUM SERPL-SCNC: 137 MMOL/L (ref 133–144)
SPECIMEN EXPIRATION DATE: NORMAL
WBC # BLD AUTO: 5.3 10E3/UL (ref 4–11)

## 2022-06-29 PROCEDURE — C1732 CATH, EP, DIAG/ABL, 3D/VECT: HCPCS | Performed by: INTERNAL MEDICINE

## 2022-06-29 PROCEDURE — 999N000071 HC STATISTIC HEART CATH LAB OR EP LAB

## 2022-06-29 PROCEDURE — 999N000054 HC STATISTIC EKG NON-CHARGEABLE

## 2022-06-29 PROCEDURE — C1769 GUIDE WIRE: HCPCS | Performed by: INTERNAL MEDICINE

## 2022-06-29 PROCEDURE — 999N000184 HC STATISTIC TELEMETRY

## 2022-06-29 PROCEDURE — 86901 BLOOD TYPING SEROLOGIC RH(D): CPT | Performed by: INTERNAL MEDICINE

## 2022-06-29 PROCEDURE — C1759 CATH, INTRA ECHOCARDIOGRAPHY: HCPCS | Performed by: INTERNAL MEDICINE

## 2022-06-29 PROCEDURE — 93656 COMPRE EP EVAL ABLTJ ATR FIB: CPT | Performed by: INTERNAL MEDICINE

## 2022-06-29 PROCEDURE — 84702 CHORIONIC GONADOTROPIN TEST: CPT | Performed by: INTERNAL MEDICINE

## 2022-06-29 PROCEDURE — 250N000012 HC RX MED GY IP 250 OP 636 PS 637: Performed by: ANESTHESIOLOGY

## 2022-06-29 PROCEDURE — 36415 COLL VENOUS BLD VENIPUNCTURE: CPT | Performed by: INTERNAL MEDICINE

## 2022-06-29 PROCEDURE — C1730 CATH, EP, 19 OR FEW ELECT: HCPCS | Performed by: INTERNAL MEDICINE

## 2022-06-29 PROCEDURE — G0378 HOSPITAL OBSERVATION PER HR: HCPCS

## 2022-06-29 PROCEDURE — 93655 ICAR CATH ABLTJ DSCRT ARRHYT: CPT | Performed by: INTERNAL MEDICINE

## 2022-06-29 PROCEDURE — 93010 ELECTROCARDIOGRAM REPORT: CPT | Performed by: INTERNAL MEDICINE

## 2022-06-29 PROCEDURE — 80048 BASIC METABOLIC PNL TOTAL CA: CPT | Performed by: INTERNAL MEDICINE

## 2022-06-29 PROCEDURE — 370N000017 HC ANESTHESIA TECHNICAL FEE, PER MIN: Performed by: INTERNAL MEDICINE

## 2022-06-29 PROCEDURE — 250N000009 HC RX 250: Performed by: NURSE ANESTHETIST, CERTIFIED REGISTERED

## 2022-06-29 PROCEDURE — 250N000009 HC RX 250: Performed by: ANESTHESIOLOGY

## 2022-06-29 PROCEDURE — 272N000001 HC OR GENERAL SUPPLY STERILE: Performed by: INTERNAL MEDICINE

## 2022-06-29 PROCEDURE — C1894 INTRO/SHEATH, NON-LASER: HCPCS | Performed by: INTERNAL MEDICINE

## 2022-06-29 PROCEDURE — C1887 CATHETER, GUIDING: HCPCS | Performed by: INTERNAL MEDICINE

## 2022-06-29 PROCEDURE — 250N000011 HC RX IP 250 OP 636: Performed by: INTERNAL MEDICINE

## 2022-06-29 PROCEDURE — C1733 CATH, EP, OTHR THAN COOL-TIP: HCPCS | Performed by: INTERNAL MEDICINE

## 2022-06-29 PROCEDURE — 85027 COMPLETE CBC AUTOMATED: CPT | Performed by: INTERNAL MEDICINE

## 2022-06-29 PROCEDURE — 93005 ELECTROCARDIOGRAM TRACING: CPT

## 2022-06-29 PROCEDURE — 86850 RBC ANTIBODY SCREEN: CPT | Performed by: INTERNAL MEDICINE

## 2022-06-29 PROCEDURE — 250N000011 HC RX IP 250 OP 636: Performed by: ANESTHESIOLOGY

## 2022-06-29 PROCEDURE — 85347 COAGULATION TIME ACTIVATED: CPT

## 2022-06-29 PROCEDURE — 258N000003 HC RX IP 258 OP 636: Performed by: ANESTHESIOLOGY

## 2022-06-29 RX ORDER — FLECAINIDE ACETATE 100 MG/1
100 TABLET ORAL 2 TIMES DAILY
COMMUNITY
End: 2022-08-10

## 2022-06-29 RX ORDER — SCOLOPAMINE TRANSDERMAL SYSTEM 1 MG/1
1 PATCH, EXTENDED RELEASE TRANSDERMAL ONCE
Status: DISCONTINUED | OUTPATIENT
Start: 2022-06-29 | End: 2022-06-29 | Stop reason: HOSPADM

## 2022-06-29 RX ORDER — ONDANSETRON 4 MG/1
4 TABLET, ORALLY DISINTEGRATING ORAL EVERY 6 HOURS PRN
Status: DISCONTINUED | OUTPATIENT
Start: 2022-06-29 | End: 2022-06-29 | Stop reason: HOSPADM

## 2022-06-29 RX ORDER — HEPARIN SODIUM 10000 [USP'U]/100ML
INJECTION, SOLUTION INTRAVENOUS CONTINUOUS PRN
Status: DISCONTINUED | OUTPATIENT
Start: 2022-06-29 | End: 2022-06-29 | Stop reason: HOSPADM

## 2022-06-29 RX ORDER — LIDOCAINE 40 MG/G
CREAM TOPICAL
Status: DISCONTINUED | OUTPATIENT
Start: 2022-06-29 | End: 2022-06-29 | Stop reason: HOSPADM

## 2022-06-29 RX ORDER — ONDANSETRON 2 MG/ML
INJECTION INTRAMUSCULAR; INTRAVENOUS PRN
Status: DISCONTINUED | OUTPATIENT
Start: 2022-06-29 | End: 2022-06-29

## 2022-06-29 RX ORDER — ONDANSETRON 2 MG/ML
4 INJECTION INTRAMUSCULAR; INTRAVENOUS EVERY 6 HOURS PRN
Status: DISCONTINUED | OUTPATIENT
Start: 2022-06-29 | End: 2022-06-29 | Stop reason: HOSPADM

## 2022-06-29 RX ORDER — ACETAMINOPHEN 325 MG/1
650 TABLET ORAL EVERY 4 HOURS PRN
Status: DISCONTINUED | OUTPATIENT
Start: 2022-06-29 | End: 2022-06-29 | Stop reason: HOSPADM

## 2022-06-29 RX ORDER — PROPOFOL 10 MG/ML
INJECTION, EMULSION INTRAVENOUS PRN
Status: DISCONTINUED | OUTPATIENT
Start: 2022-06-29 | End: 2022-06-29

## 2022-06-29 RX ORDER — ADENOSINE 3 MG/ML
INJECTION, SOLUTION INTRAVENOUS
Status: DISCONTINUED | OUTPATIENT
Start: 2022-06-29 | End: 2022-06-29 | Stop reason: HOSPADM

## 2022-06-29 RX ORDER — FENTANYL CITRATE 50 UG/ML
INJECTION, SOLUTION INTRAMUSCULAR; INTRAVENOUS PRN
Status: DISCONTINUED | OUTPATIENT
Start: 2022-06-29 | End: 2022-06-29

## 2022-06-29 RX ORDER — GLYCOPYRROLATE 0.2 MG/ML
INJECTION, SOLUTION INTRAMUSCULAR; INTRAVENOUS PRN
Status: DISCONTINUED | OUTPATIENT
Start: 2022-06-29 | End: 2022-06-29

## 2022-06-29 RX ORDER — NEOSTIGMINE METHYLSULFATE 1 MG/ML
VIAL (ML) INJECTION PRN
Status: DISCONTINUED | OUTPATIENT
Start: 2022-06-29 | End: 2022-06-29

## 2022-06-29 RX ORDER — APREPITANT 40 MG/1
40 CAPSULE ORAL ONCE
Status: COMPLETED | OUTPATIENT
Start: 2022-06-29 | End: 2022-06-29

## 2022-06-29 RX ORDER — DEXAMETHASONE SODIUM PHOSPHATE 4 MG/ML
INJECTION, SOLUTION INTRA-ARTICULAR; INTRALESIONAL; INTRAMUSCULAR; INTRAVENOUS; SOFT TISSUE PRN
Status: DISCONTINUED | OUTPATIENT
Start: 2022-06-29 | End: 2022-06-29

## 2022-06-29 RX ORDER — LIDOCAINE HYDROCHLORIDE 20 MG/ML
INJECTION, SOLUTION INFILTRATION; PERINEURAL PRN
Status: DISCONTINUED | OUTPATIENT
Start: 2022-06-29 | End: 2022-06-29

## 2022-06-29 RX ORDER — PROTAMINE SULFATE 10 MG/ML
INJECTION, SOLUTION INTRAVENOUS PRN
Status: DISCONTINUED | OUTPATIENT
Start: 2022-06-29 | End: 2022-06-29

## 2022-06-29 RX ORDER — PROPOFOL 10 MG/ML
INJECTION, EMULSION INTRAVENOUS CONTINUOUS PRN
Status: DISCONTINUED | OUTPATIENT
Start: 2022-06-29 | End: 2022-06-29

## 2022-06-29 RX ORDER — HEPARIN SODIUM 1000 [USP'U]/ML
INJECTION, SOLUTION INTRAVENOUS; SUBCUTANEOUS
Status: DISCONTINUED | OUTPATIENT
Start: 2022-06-29 | End: 2022-06-29 | Stop reason: HOSPADM

## 2022-06-29 RX ORDER — DEXMEDETOMIDINE HYDROCHLORIDE 4 UG/ML
INJECTION, SOLUTION INTRAVENOUS PRN
Status: DISCONTINUED | OUTPATIENT
Start: 2022-06-29 | End: 2022-06-29

## 2022-06-29 RX ORDER — SODIUM CHLORIDE, SODIUM LACTATE, POTASSIUM CHLORIDE, CALCIUM CHLORIDE 600; 310; 30; 20 MG/100ML; MG/100ML; MG/100ML; MG/100ML
INJECTION, SOLUTION INTRAVENOUS CONTINUOUS PRN
Status: DISCONTINUED | OUTPATIENT
Start: 2022-06-29 | End: 2022-06-29

## 2022-06-29 RX ORDER — EPHEDRINE SULFATE 50 MG/ML
INJECTION, SOLUTION INTRAMUSCULAR; INTRAVENOUS; SUBCUTANEOUS PRN
Status: DISCONTINUED | OUTPATIENT
Start: 2022-06-29 | End: 2022-06-29

## 2022-06-29 RX ADMIN — ONDANSETRON 4 MG: 2 INJECTION INTRAMUSCULAR; INTRAVENOUS at 09:27

## 2022-06-29 RX ADMIN — MIDAZOLAM 2 MG: 1 INJECTION INTRAMUSCULAR; INTRAVENOUS at 07:35

## 2022-06-29 RX ADMIN — PROPOFOL 20 MG: 10 INJECTION, EMULSION INTRAVENOUS at 08:18

## 2022-06-29 RX ADMIN — SCOPALAMINE 1 PATCH: 1 PATCH, EXTENDED RELEASE TRANSDERMAL at 07:19

## 2022-06-29 RX ADMIN — FENTANYL CITRATE 50 MCG: 50 INJECTION, SOLUTION INTRAMUSCULAR; INTRAVENOUS at 08:25

## 2022-06-29 RX ADMIN — PROTAMINE SULFATE 50 MG: 10 INJECTION, SOLUTION INTRAVENOUS at 09:38

## 2022-06-29 RX ADMIN — ROCURONIUM BROMIDE 50 MG: 50 INJECTION, SOLUTION INTRAVENOUS at 07:49

## 2022-06-29 RX ADMIN — Medication 5 MG: at 09:46

## 2022-06-29 RX ADMIN — GLYCOPYRROLATE 0.8 MG: 0.2 INJECTION, SOLUTION INTRAMUSCULAR; INTRAVENOUS at 09:46

## 2022-06-29 RX ADMIN — FENTANYL CITRATE 50 MCG: 50 INJECTION, SOLUTION INTRAMUSCULAR; INTRAVENOUS at 08:32

## 2022-06-29 RX ADMIN — DEXAMETHASONE SODIUM PHOSPHATE 4 MG: 4 INJECTION, SOLUTION INTRA-ARTICULAR; INTRALESIONAL; INTRAMUSCULAR; INTRAVENOUS; SOFT TISSUE at 07:55

## 2022-06-29 RX ADMIN — ONDANSETRON 4 MG: 2 INJECTION INTRAMUSCULAR; INTRAVENOUS at 13:32

## 2022-06-29 RX ADMIN — LIDOCAINE HYDROCHLORIDE 100 MG: 20 INJECTION, SOLUTION INFILTRATION; PERINEURAL at 07:48

## 2022-06-29 RX ADMIN — DEXMEDETOMIDINE 12 MCG: 100 INJECTION, SOLUTION, CONCENTRATE INTRAVENOUS at 09:15

## 2022-06-29 RX ADMIN — ROCURONIUM BROMIDE 20 MG: 50 INJECTION, SOLUTION INTRAVENOUS at 08:48

## 2022-06-29 RX ADMIN — PROPOFOL 200 MG: 10 INJECTION, EMULSION INTRAVENOUS at 07:48

## 2022-06-29 RX ADMIN — PROPOFOL 40 MG: 10 INJECTION, EMULSION INTRAVENOUS at 09:13

## 2022-06-29 RX ADMIN — FENTANYL CITRATE 100 MCG: 50 INJECTION, SOLUTION INTRAMUSCULAR; INTRAVENOUS at 07:48

## 2022-06-29 RX ADMIN — PROPOFOL 150 MCG/KG/MIN: 10 INJECTION, EMULSION INTRAVENOUS at 07:50

## 2022-06-29 RX ADMIN — Medication 5 MG: at 07:49

## 2022-06-29 RX ADMIN — SODIUM CHLORIDE, POTASSIUM CHLORIDE, SODIUM LACTATE AND CALCIUM CHLORIDE: 600; 310; 30; 20 INJECTION, SOLUTION INTRAVENOUS at 07:35

## 2022-06-29 RX ADMIN — APREPITANT 40 MG: 40 CAPSULE ORAL at 07:20

## 2022-06-29 RX ADMIN — PROPOFOL 50 MG: 10 INJECTION, EMULSION INTRAVENOUS at 07:49

## 2022-06-29 RX ADMIN — PROPOFOL 20 MG: 10 INJECTION, EMULSION INTRAVENOUS at 08:21

## 2022-06-29 NOTE — INTERVAL H&P NOTE
"I have reviewed the surgical (or preoperative) H&P that is linked to this encounter, and examined the patient. There are no significant changes    Clinical Conditions Present on Arrival:  Clinically Significant Risk Factors Present on Admission                 # Coagulation Defect: home medication list includes an anticoagulant medication   # Obesity: Estimated body mass index is 36.69 kg/m  as calculated from the following:    Height as of this encounter: 1.651 m (5' 5\").    Weight as of this encounter: 100 kg (220 lb 8 oz).       "

## 2022-06-29 NOTE — ANESTHESIA CARE TRANSFER NOTE
Patient: Tania Kaufman    Procedure: Procedure(s):  EP Ablation Atrial Fibrilation       Diagnosis: Paroxysmal atrial fibrillation and typical atrial flutter  Diagnosis Additional Information: No value filed.    Anesthesia Type:   General     Note:    Oropharynx: oropharynx clear of all foreign objects and spontaneously breathing  Level of Consciousness: drowsy  Oxygen Supplementation: face mask  Level of Supplemental Oxygen (L/min / FiO2): 10  Independent Airway: airway patency satisfactory and stable  Dentition: dentition unchanged  Vital Signs Stable: post-procedure vital signs reviewed and stable  Report to RN Given: handoff report given  Patient transferred to: PACU    Handoff Report: Identifed the Patient, Identified the Reponsible Provider, Reviewed the pertinent medical history, Discussed the surgical course, Reviewed Intra-OP anesthesia mangement and issues during anesthesia, Set expectations for post-procedure period and Allowed opportunity for questions and acknowledgement of understanding      Vitals:  Vitals Value Taken Time   /72 06/29/22 1006   Temp     Pulse 71 06/29/22 1007   Resp 23 06/29/22 1007   SpO2 99 % 06/29/22 1007   Vitals shown include unvalidated device data.    Electronically Signed By: TAMIA Rose CRNA  June 29, 2022  10:09 AM

## 2022-06-29 NOTE — DISCHARGE INSTRUCTIONS
[facility]  Cardiac Electrophysiology  [address]  [place] [address]   Office: [phone]  Fax: [phone]     Cardiac Electrophysiology - Post Ablation Discharge Instructions      PROCEDURE   Atrial fibrillation and atrial flutter ablation         MEDICATION INSTRUCTIONS   Continue taking your prior to procedure medications, including diltiazem and flecainide for now (we will assess for cessation of these at your 6 week follow-up visit).  Continue Nexium for 6 weeks following ablation.  Continue taking your blood thinner apixaban (Eliquis).          DISCHARGE INSTRUCTIONS   General instructions  Have an adult stay with you until tomorrow.  You may resume your normal diet.    You may shower tomorrow.  Do NOT take a bath, or use a hot tub or pool for at least 1 week. Do not scrub the site. Do not use lotion or powder near the puncture site.    Groin care instructions  For the first 24 hrs - check the puncture site every 1-2 hours while awake.  You may keep a bandaid over the puncture sites for 1 or 2 days post-procedure and thereafter may keep these sites uncovered.  Change the bandaid daily.  If there is minor oozing, apply another bandaid and remove it after 12 hours.  For 2 days, when you cough, sneeze, laugh or move your bowels, hold your hand over the puncture site and press firmly.  Mild bruising at the access sites is normal.  If you notice increased swelling, external bleeding, or have other concerns regarding your access sites please consider emergency department evaluation and call your electrophysiology team's office    Activity recommendations  Do not drive for 3 days.  Avoid stooping or squatting more than 90 degrees at the hips for 7 days  Avoid repetitive motions such as loading , vacuuming, raking or shoveling  Avoid heavy lifting (greater than 25lbs) for 1 week    Post ablation instructions  You may have some irregular heartbeats following  your ablation.  These may feel very strong and feel like atrial fibrillation re-initiation is imminent - these episodes should occur less frequently over time.  Recurrent atrial fibrillation can occur within the first 3 months post ablation while your heart recovers from the procedure.  Pleuritic chest discomfort (chest pain worse with taking deep breaths, worse with laying flat on your back) can occur after ablation, usually coming about within the first 24-48hrs post ablation.  If this occurs and is severe enough to be troublesome to you, please call us and consider starting a course of ibuprofen 400mg three times daily for 5 to 7 days    Things to watch for  As with any type of procedure, please be more attentive to unusual symptoms post ablation (eg. fever, neurologic changes, pain with swallowing, loss of consciousness, etc) - we recommend ER evaluation for any such symptoms in the first few weeks post procedure.    Consider ER evaluation for the following:  Severe chest pain not relieved by Tylenol or Ibuprofen  You have chills or a fever greater than 101 F (38 C)  Neurologic changes (eg. leg, arm or face weakness or numbness, difficulties with speech or word finding, problems walking or with your balance, vision changes)  Severe difficulty swallowing and/or you are coughing up blood  Shortness of breath  Increased groin pain or a large or growing hard lump around the site  Groin is red, swollen, hot or tender  Blood or fluid is draining from the groin site  Any numbness, coolness or changes in color in your extremities  Groin pain not relieved by Tylenol or Advil  Recurrent atrial fibrillation associated with sustained rapid heart rates or associated with additional concerning symptoms.    Our office will have a follow-up visit scheduled for you in approximately 6 weeks.  Please do not hesitate to call us before that time should issues arise.        [facility]  [facility]    [phone]    If you are calling after hours, please listen to the entire voicemail,   a live  will answer at the end of the message.    [phone] to reach the EP nurses working with Dr Moreau

## 2022-06-29 NOTE — Clinical Note
Removed and manual pressure held until hemostasis obtained  With figure eight stitch and stop cock closure

## 2022-06-29 NOTE — PROGRESS NOTES
Care Suites Discharge Nursing Note    Patient Information  Name: Tania Kaufman  Age: 56 year old    Discharge Education:  Discharge instructions reviewed: Yes  Patient/patient representative verbalizes understanding: Yes  Patient discharging on new medications: No  Medication education completed: Yes    Discharge Plans:   Discharge location: home  Discharge ride contacted: Yes  Approximate discharge time: 14:45    Discharge Criteria:  Discharge criteria met and vital signs stable: Yes    Patient Belongs:  Patient belongings returned to patient: Yes

## 2022-06-29 NOTE — PROGRESS NOTES
Care Suites Post Procedure Note    Patient Information  Name: Tania Kaufman  Age: 56 year old    Post Procedure  Time patient returned to Care Suites: 1125  Concerns/abnormal assessment: no  If abnormal assessment, provider notified: N/A  Plan/Other: Recover in Care suites- off bedrest at 1300.  Monitor.    Lashonda Sandoval RN

## 2022-06-29 NOTE — Clinical Note
[employer] Med system 12 lead EKG, hemodynamics 5 lead, pulse oximetery, NIBP, Physi[employer]ontrol hands off defibrillator/external pacer, with 3 monitoring leads to patient. Baseline assessment done.

## 2022-06-29 NOTE — ANESTHESIA POSTPROCEDURE EVALUATION
Patient: Tania Kaufman    Procedure: Procedure(s):  EP Ablation Atrial Fibrilation       Anesthesia Type:  General    Note:  Disposition: Admission   Postop Pain Control: Uneventful            Sign Out: Well controlled pain   PONV: No   Neuro/Psych: Uneventful            Sign Out: Acceptable/Baseline neuro status   Airway/Respiratory: Uneventful            Sign Out: Acceptable/Baseline resp. status   CV/Hemodynamics: Uneventful            Sign Out: Acceptable CV status   Other NRE: NONE   DID A NON-ROUTINE EVENT OCCUR? No           Last vitals:  Vitals Value Taken Time   /83 06/29/22 1115   Temp     Pulse 69 06/29/22 1117   Resp 26 06/29/22 1117   SpO2 94 % 06/29/22 1117   Vitals shown include unvalidated device data.    Electronically Signed By: Luis Jones MD  June 29, 2022  4:18 PM

## 2022-06-29 NOTE — PROGRESS NOTES
Care Suites Admission Nursing Note    Patient Information  Name: Tania Kaufman  Age: 56 year old  Reason for admission: ablation   Care Suites arrival time: 0645    Visitor Information  Name: divina  Informed of visitor restrictions: Yes  1 visitor allowed per patient   Visitor must screen negative for COVID symptoms   Visitor must wear a mask  Waiting rooms closed to visitors    Patient Admission/Assessment   Pre-procedure assessment complete: Yes  If abnormal assessment/labs, provider notified: N/A  NPO: Yes  Medications held per instructions/orders: Yes  Consent: deferred  If applicable, pregnancy test status: deferred  Patient oriented to room: Yes  Education/questions answered: Yes  Plan/other: ablation     Discharge Planning  Discharge name/phone number: nely  [phone]  Overnight post sedation caregiver: [email or web address]  Discharge location: home    Mauricio Mckeon RN

## 2022-07-01 ENCOUNTER — VIRTUAL VISIT (OUTPATIENT)
Dept: CARDIOLOGY | Facility: CLINIC | Age: 57
End: 2022-07-01
Payer: COMMERCIAL

## 2022-07-01 DIAGNOSIS — I48.0 PAROXYSMAL ATRIAL FIBRILLATION (H): Primary | ICD-10-CM

## 2022-07-01 PROCEDURE — 99207 PR NO CHARGE NURSE ONLY: CPT

## 2022-07-01 NOTE — PATIENT INSTRUCTIONS
Your anticoagulation medication Eliquis:  It is important to remain on your anticoagulation medication uninterrupted after your ablation to reduce your risk of a stroke or heart attack, do not stop this medication  Please contact me if you have any questions regarding your anticoagulation medication    Healing from your pulmonary vein ablation:  Stay well hydrated, and increase your fluid intake during this recovery period  High protein foods aide in your bodies healing process  No aggressive or aerobic activity for 7-10 days, and do not lift more than 10 pounds for 7 days   Increase your activity gradually over the next 5-10 days, working back to your normal daily activity  If you are experiencing pain at your groin sites from the procedure, we advise applying ice for 20 minute durations 3-4 times per day     Please call me if any of the following occur:  Episodes of Atrial Fibrillation lasting greater than 4 hours, or if you notice the episodes are increasing in frequency or duration  If you develop shortness of breath, dizziness, or unresolving chest pains   Changes at your groin sites including swelling, hardening, drainage, increase in bruising, or an increase in pain  If you develop a temperature greater than 100.5 degrees (especially weeks 2-5 post   Procedure)    Call 911 if you are having symptoms of a stroke; difficulty with your speech, problems walking, difficulty with balance, vision disturbances, facial drooping or numbness, and muscle weakness on one side of your body     Your follow up appointments are as follows:  You will be seen by the electrophysiologist nurse practitioner at 6 weeks after your ablation  At your 6 week appointment, it will be determined if a 3 month follow-up is needed    Sincerely,  Gifty Guerra RN [phone]    After hours please contact the on call service at # [phone]

## 2022-07-01 NOTE — PROGRESS NOTES
Post PVI Procedural Follow Up Call    Pt is s/p PVI from 6/29 with Dr Moreau  PC was placed to pt, spoke to pt    General Assessment:     Weight: Pt reports weight is at baseline compared to pre procedural weight    Pain: Pt denies generalized or localized pain abnormal to healing s/p     /GI: Pt denies difficulty swallowing, denies urinary retention/difficulty, reports no s/s of infection, report normal appetite and reports staying hydrated.    Respiratory: Pt denies SOB, denies difficulty breathing, denies throat pain, continues to use IS as instructed, denies changes/abnormal sputum and denies any further symptoms abnormal to normal healing process s/p PVI.    Activity: Pt is tolerating advancement in activity while following physical restrictions, staying well hydrated and gradually working into baseline activity.     Rhythm Assessment:   Pt reports occasional palpitations normal to PVI recovery, reports occasional breakthrough of AF normal to PVI recovery, denies palpitations, denies irregularities in HR or rhythm and denies symptoms or sustained AF episodes.    Procedure Site Assessment:   Pts no visible/physical changes in groin sites, some bruising around sites without significant change from hospital discharge and normal to PVI recovery and small pea/dime size hardening under suture sites normal to PVI recovery    Anticoagulation/Medication:  Pt remain on Eliquis without interruption  Per guidelines by Dr Moreau no ASA needed upon discharge   Will remain on diltiazem and flecainide until further evaluation at 6 weeks post op.    Education completed with pt at this visit:  Reviewed normal post-op PVI healing process, when to contact EP-RN/EP-MD, contact information was given to the pt for further concerns or questions and pt verbalized understanding    Follow up  Pts AVS was printed and mailed to pt by scheduling team, pt will be seen by EP NP in 4-6 wks, monitor will be ordered at this follow-up if  indicated and 3mo follow-up and monitor will be determined at 6wk follow-up by EP NP    7/1/2022 11:05 AM  Gifty Guerra RN

## 2022-07-07 LAB
ATRIAL RATE - MUSE: 70 BPM
DIASTOLIC BLOOD PRESSURE - MUSE: NORMAL MMHG
INTERPRETATION ECG - MUSE: NORMAL
P AXIS - MUSE: 55 DEGREES
PR INTERVAL - MUSE: 216 MS
QRS DURATION - MUSE: 102 MS
QT - MUSE: 434 MS
QTC - MUSE: 468 MS
R AXIS - MUSE: -13 DEGREES
SYSTOLIC BLOOD PRESSURE - MUSE: NORMAL MMHG
T AXIS - MUSE: -82 DEGREES
VENTRICULAR RATE- MUSE: 70 BPM

## 2022-07-12 ENCOUNTER — HOSPITAL ENCOUNTER (EMERGENCY)
Facility: CLINIC | Age: 57
Discharge: HOME | End: 2022-07-12
Attending: EMERGENCY MEDICINE | Admitting: EMERGENCY MEDICINE
Payer: COMMERCIAL

## 2022-07-12 ENCOUNTER — TELEPHONE (OUTPATIENT)
Dept: CARDIOLOGY | Facility: CLINIC | Age: 57
End: 2022-07-12

## 2022-07-12 ENCOUNTER — APPOINTMENT (OUTPATIENT)
Dept: CT IMAGING | Facility: CLINIC | Age: 57
End: 2022-07-12
Attending: EMERGENCY MEDICINE
Payer: COMMERCIAL

## 2022-07-12 VITALS
SYSTOLIC BLOOD PRESSURE: 103 MMHG | OXYGEN SATURATION: 97 % | RESPIRATION RATE: 14 BRPM | TEMPERATURE: 97.4 F | BODY MASS INDEX: 36.65 KG/M2 | HEIGHT: 65 IN | WEIGHT: 220 LBS | DIASTOLIC BLOOD PRESSURE: 60 MMHG | HEART RATE: 97 BPM

## 2022-07-12 DIAGNOSIS — K57.32 DIVERTICULITIS OF COLON: ICD-10-CM

## 2022-07-12 DIAGNOSIS — I48.0 PAROXYSMAL ATRIAL FIBRILLATION (H): ICD-10-CM

## 2022-07-12 DIAGNOSIS — K57.20 COLONIC DIVERTICULAR ABSCESS: ICD-10-CM

## 2022-07-12 LAB
ANION GAP SERPL CALCULATED.3IONS-SCNC: 9 MMOL/L (ref 5–18)
ATRIAL RATE - MUSE: 170 BPM
BASOPHILS # BLD AUTO: 0 10E3/UL (ref 0–0.2)
BASOPHILS NFR BLD AUTO: 0 %
BUN SERPL-MCNC: 9 MG/DL (ref 8–22)
CALCIUM SERPL-MCNC: 9.3 MG/DL (ref 8.5–10.5)
CHLORIDE BLD-SCNC: 109 MMOL/L (ref 98–107)
CO2 SERPL-SCNC: 23 MMOL/L (ref 22–31)
CREAT SERPL-MCNC: 0.75 MG/DL (ref 0.6–1.1)
DIASTOLIC BLOOD PRESSURE - MUSE: NORMAL MMHG
EOSINOPHIL # BLD AUTO: 0.2 10E3/UL (ref 0–0.7)
EOSINOPHIL NFR BLD AUTO: 2 %
ERYTHROCYTE [DISTWIDTH] IN BLOOD BY AUTOMATED COUNT: 13.1 % (ref 10–15)
GFR SERPL CREATININE-BSD FRML MDRD: >90 ML/MIN/1.73M2
GLUCOSE BLD-MCNC: 112 MG/DL (ref 70–125)
HCT VFR BLD AUTO: 38.4 % (ref 35–47)
HGB BLD-MCNC: 12.5 G/DL (ref 11.7–15.7)
HOLD SPECIMEN: NORMAL
IMM GRANULOCYTES # BLD: 0.1 10E3/UL
IMM GRANULOCYTES NFR BLD: 1 %
INTERPRETATION ECG - MUSE: NORMAL
LYMPHOCYTES # BLD AUTO: 1.8 10E3/UL (ref 0.8–5.3)
LYMPHOCYTES NFR BLD AUTO: 19 %
MAGNESIUM SERPL-MCNC: 2.1 MG/DL (ref 1.8–2.6)
MCH RBC QN AUTO: 29.1 PG (ref 26.5–33)
MCHC RBC AUTO-ENTMCNC: 32.6 G/DL (ref 31.5–36.5)
MCV RBC AUTO: 89 FL (ref 78–100)
MONOCYTES # BLD AUTO: 0.9 10E3/UL (ref 0–1.3)
MONOCYTES NFR BLD AUTO: 9 %
NEUTROPHILS # BLD AUTO: 6.6 10E3/UL (ref 1.6–8.3)
NEUTROPHILS NFR BLD AUTO: 69 %
NRBC # BLD AUTO: 0 10E3/UL
NRBC BLD AUTO-RTO: 0 /100
P AXIS - MUSE: NORMAL DEGREES
PLATELET # BLD AUTO: 278 10E3/UL (ref 150–450)
POTASSIUM BLD-SCNC: 4 MMOL/L (ref 3.5–5)
PR INTERVAL - MUSE: NORMAL MS
QRS DURATION - MUSE: 106 MS
QT - MUSE: 334 MS
QTC - MUSE: 478 MS
R AXIS - MUSE: 22 DEGREES
RBC # BLD AUTO: 4.3 10E6/UL (ref 3.8–5.2)
SODIUM SERPL-SCNC: 141 MMOL/L (ref 136–145)
SYSTOLIC BLOOD PRESSURE - MUSE: NORMAL MMHG
T AXIS - MUSE: 50 DEGREES
T4 FREE SERPL-MCNC: 1.5 NG/DL (ref 0.9–1.7)
TROPONIN I SERPL-MCNC: <0.01 NG/ML (ref 0–0.29)
TSH SERPL DL<=0.005 MIU/L-ACNC: 5.94 UIU/ML (ref 0.3–5)
VENTRICULAR RATE- MUSE: 123 BPM
WBC # BLD AUTO: 9.6 10E3/UL (ref 4–11)

## 2022-07-12 PROCEDURE — 99285 EMERGENCY DEPT VISIT HI MDM: CPT | Mod: 25

## 2022-07-12 PROCEDURE — 250N000011 HC RX IP 250 OP 636: Performed by: EMERGENCY MEDICINE

## 2022-07-12 PROCEDURE — 80048 BASIC METABOLIC PNL TOTAL CA: CPT | Performed by: EMERGENCY MEDICINE

## 2022-07-12 PROCEDURE — 93005 ELECTROCARDIOGRAM TRACING: CPT | Performed by: EMERGENCY MEDICINE

## 2022-07-12 PROCEDURE — 84443 ASSAY THYROID STIM HORMONE: CPT | Performed by: EMERGENCY MEDICINE

## 2022-07-12 PROCEDURE — 258N000003 HC RX IP 258 OP 636: Performed by: EMERGENCY MEDICINE

## 2022-07-12 PROCEDURE — 74177 CT ABD & PELVIS W/CONTRAST: CPT

## 2022-07-12 PROCEDURE — 96374 THER/PROPH/DIAG INJ IV PUSH: CPT | Mod: 59

## 2022-07-12 PROCEDURE — 36415 COLL VENOUS BLD VENIPUNCTURE: CPT | Performed by: EMERGENCY MEDICINE

## 2022-07-12 PROCEDURE — 84439 ASSAY OF FREE THYROXINE: CPT | Performed by: EMERGENCY MEDICINE

## 2022-07-12 PROCEDURE — 250N000009 HC RX 250: Performed by: EMERGENCY MEDICINE

## 2022-07-12 PROCEDURE — 85004 AUTOMATED DIFF WBC COUNT: CPT | Performed by: EMERGENCY MEDICINE

## 2022-07-12 PROCEDURE — 96361 HYDRATE IV INFUSION ADD-ON: CPT

## 2022-07-12 PROCEDURE — 83735 ASSAY OF MAGNESIUM: CPT | Performed by: EMERGENCY MEDICINE

## 2022-07-12 PROCEDURE — 96376 TX/PRO/DX INJ SAME DRUG ADON: CPT

## 2022-07-12 PROCEDURE — 84484 ASSAY OF TROPONIN QUANT: CPT | Performed by: EMERGENCY MEDICINE

## 2022-07-12 RX ORDER — IOPAMIDOL 755 MG/ML
100 INJECTION, SOLUTION INTRAVASCULAR ONCE
Status: COMPLETED | OUTPATIENT
Start: 2022-07-12 | End: 2022-07-12

## 2022-07-12 RX ORDER — DILTIAZEM HYDROCHLORIDE 5 MG/ML
15 INJECTION INTRAVENOUS ONCE
Status: COMPLETED | OUTPATIENT
Start: 2022-07-12 | End: 2022-07-12

## 2022-07-12 RX ORDER — SODIUM CHLORIDE 9 MG/ML
INJECTION, SOLUTION INTRAVENOUS CONTINUOUS
Status: DISCONTINUED | OUTPATIENT
Start: 2022-07-12 | End: 2022-07-12 | Stop reason: HOSPADM

## 2022-07-12 RX ORDER — CIPROFLOXACIN 500 MG/1
500 TABLET, FILM COATED ORAL 2 TIMES DAILY
Qty: 28 TABLET | Refills: 0 | Status: SHIPPED | OUTPATIENT
Start: 2022-07-12 | End: 2022-07-26

## 2022-07-12 RX ORDER — METRONIDAZOLE 500 MG/1
500 TABLET ORAL 2 TIMES DAILY
Qty: 28 TABLET | Refills: 0 | Status: SHIPPED | OUTPATIENT
Start: 2022-07-12 | End: 2022-07-26

## 2022-07-12 RX ORDER — DILTIAZEM HYDROCHLORIDE 5 MG/ML
10 INJECTION INTRAVENOUS ONCE
Status: COMPLETED | OUTPATIENT
Start: 2022-07-12 | End: 2022-07-12

## 2022-07-12 RX ADMIN — DILTIAZEM HYDROCHLORIDE 10 MG: 5 INJECTION, SOLUTION INTRAVENOUS at 04:38

## 2022-07-12 RX ADMIN — IOPAMIDOL 100 ML: 755 INJECTION, SOLUTION INTRAVENOUS at 04:46

## 2022-07-12 RX ADMIN — SODIUM CHLORIDE 1000 ML: 9 INJECTION, SOLUTION INTRAVENOUS at 03:55

## 2022-07-12 RX ADMIN — DILTIAZEM HYDROCHLORIDE 15 MG: 5 INJECTION, SOLUTION INTRAVENOUS at 03:56

## 2022-07-12 ASSESSMENT — ENCOUNTER SYMPTOMS
SHORTNESS OF BREATH: 0
ABDOMINAL PAIN: 1

## 2022-07-12 NOTE — TELEPHONE ENCOUNTER
Health Call Center    Phone Message    May a detailed message be left on voicemail: yes     Reason for Call: Symptoms or Concerns     If patient has red-flag symptoms, warm transfer to triage line    Current symptom or concern: Atrial fibrillation    Symptoms have been present for: 22 hour(s)    Has patient previously been seen for this? Yes    By: Dr Prieto Presley,    Date: 06.21.22, 11.09.21    Are there any new or worsening symptoms? Yes: Atrial fibrillation    Eden called because she had an ablation procedure on 06.29.22 and has now been in a-fib for the last 22 hours. She went to the emergency room at the [facility] earlier today but the meds they gave her aren't helping. Please give Eden a call back to discuss. Thank you!    Action Taken: Other: Cardiology    Travel Screening: Not Applicable

## 2022-07-12 NOTE — ED PROVIDER NOTES
EMERGENCY DEPARTMENT ENCOUNTER      NAME: Tania Kaufman  AGE: 56 year old female  YOB: 1965  MRN: [id number]  EVALUATION DATE & TIME: 7/12/2022  3:31 AM    PCP: Lashonda Arango    ED PROVIDER: Gregorio Salazar M.D.      Chief Complaint   Patient presents with     Irregular Heart Beat     Abdominal Pain         FINAL IMPRESSION:  1. Paroxysmal atrial fibrillation (H)    2. Diverticulitis of colon    3. Colonic diverticular abscess          ED COURSE & MEDICAL DECISION MAKING:    Pertinent Labs & Imaging studies reviewed. (See chart for details)  56 year old female presents to the Emergency Department for evaluation of palpitations and abdominal pain.  Patient comes in for 2 symptoms.  Patient had acute onset of palpitations last night.  Has a history of A. fib is on Eliquis and diltiazem.  Also on flecainide.  Was ablated recently.  Does appear to be in A. fib again.  Electrolytes normal.  Given diltiazem and heart rate did slow down.  Also get a CT scan given the abdominal pain.  There is diverticulitis with a intramural abscess.  This is 2.5 cm.  I think this is too small to drain.  We will try antibiotic treatment.  We will have her follow-up with her primary this week.  I am hesitant to have any wound drainage as she is on Eliquis.  If she has worsening symptoms such as worsening abdominal pain fever vomiting or other concerns she will return immediately to the ER.  She also follow-up with a cardiologist discuss further treatment of her A. fib.    3:39 AM I met with the patient to gather history and to perform my initial exam. I discussed the plan for care while in the Emergency Department. PPE: Full, N95, face mask, eye protection, and gloves.      At the conclusion of the encounter I discussed the results of all of the tests and the disposition. The questions were answered. The patient or family acknowledged understanding and was agreeable with the care plan.           MEDICATIONS  GIVEN IN THE EMERGENCY:  Medications   0.9% sodium chloride BOLUS (0 mLs Intravenous Stopped 7/12/22 4652)     Followed by   sodium chloride 0.9% infusion ( Intravenous Not Given 7/12/22 2825)   diltiazem (CARDIZEM) injection 15 mg (15 mg Intravenous Given 7/12/22 5456)   diltiazem (CARDIZEM) injection 10 mg (10 mg Intravenous Given 7/12/22 6364)   iopamidol (ISOVUE-370) solution 100 mL (100 mLs Intravenous Given 7/12/22 6456)       NEW PRESCRIPTIONS STARTED AT TODAY'S ER VISIT  New Prescriptions    CIPROFLOXACIN (CIPRO) 500 MG TABLET    Take 1 tablet (500 mg) by mouth 2 times daily for 14 days    METRONIDAZOLE (FLAGYL) 500 MG TABLET    Take 1 tablet (500 mg) by mouth 2 times daily for 14 days          =================================================================    HPI    Patient information was obtained from: Patient     Use of : N/A         Tania Kaufman is a 56 year old female with a pertinent history of kidney stones, diverticulitis of large intestine, IBS, and atrial flutter who presents to this ED via walk-in for evaluation of A-fib and abdominal pain.     Patient reports she had an ablation on 7/6 for her A-fib. She is now experiencing heart racing sensation. She is medication compliant. Her episode stated around 6:00PM last night while she was on the phone. Endorses left lower quadrant abdominal pain as well.     Denies chest pain, shortness of breath, loss of consciousness, diarrhea. Denies consumption of caffeine and alcohol.        REVIEW OF SYSTEMS   Review of Systems   Respiratory: Negative for shortness of breath.    Cardiovascular: Negative for chest pain.        Positive for heart racing sensation.    Gastrointestinal: Positive for abdominal pain (Left lower quadrant ).   All other systems reviewed and are negative.      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      PONV (postoperative nausea and vomiting)      PONV (postoperative nausea and vomiting)        PAST SURGICAL HISTORY:  Past Surgical History:   Procedure Laterality Date     EP ABLATION FOCAL AFIB N/A 6/29/2022    Procedure: EP Ablation Atrial Fibrilation;  Surgeon: Seven Moreau MD;  Location:  [facility]     NO PAST SURGERIES       OTHER SURGICAL HISTORY      wisdom teeth removal     FL CYSTO/URETERO W/LITHOTRIPSY &INDWELL STENT INSRT Right 6/11/2019    Procedure: CYSTOSCOPY, RIGHT RETROGRADE,  RIGHT URETEROSCOPY WITH LASER LITHOTRIPSY AND STENT INSERTION;  Surgeon: John Gutierrez MD;  Location: [facility];  Service: Urology           CURRENT MEDICATIONS:    Current Facility-Administered Medications   Medication     sodium chloride 0.9% infusion     Current Outpatient Medications   Medication     ciprofloxacin (CIPRO) 500 MG tablet     metroNIDAZOLE (FLAGYL) 500 MG tablet     apixaban ANTICOAGULANT (ELIQUIS) 5 MG tablet     co-enzyme Q-10 30 mg capsule     diltiazem ER COATED BEADS (CARDIZEM CD/CARTIA XT) 120 MG 24 hr capsule     flecainide (TAMBOCOR) 100 MG tablet     GLUCOSAMINE SULFATE (GLUCOSAMINE ORAL)     L.ACID/L.CASEI/B.BIF/B.LUKE/FOS (PROBIOTIC BLEND ORAL)     melatonin 3 mg Tab tablet     multivitamin therapeutic (THERAGRAN) tablet     NON FORMULARY         ALLERGIES:  No Known Allergies    FAMILY HISTORY:  Family History   Problem Relation Age of Onset     Cancer Mother         skin     Pacemaker Mother         4 open heart surgeries      Diverticulitis Father      Breast Cancer  "Other 40.00        great aunt     Urolithiasis Brother         recurrent     Gout No family hx of        SOCIAL HISTORY:   Social History     Socioeconomic History     Marital status:    Tobacco Use     Smoking status: Never Smoker     Smokeless tobacco: Never Used   Substance and Sexual Activity     Alcohol use: Yes     Comment: Alcoholic Drinks/day: social only     Drug use: No       VITALS:  /60   Pulse 97   Temp 97.4  F (36.3  C) (Temporal)   Resp 14   Ht 1.651 m (5' 5\")   Wt 99.8 kg (220 lb)   SpO2 97%   BMI 36.61 kg/m      PHYSICAL EXAM    Physical Exam  Constitutional:       General: She is not in acute distress.     Appearance: She is not diaphoretic.   HENT:      Head: Atraumatic.      Mouth/Throat:      Pharynx: No oropharyngeal exudate.   Eyes:      General: No scleral icterus.     Pupils: Pupils are equal, round, and reactive to light.   Cardiovascular:      Rate and Rhythm: Tachycardia present. Rhythm irregular.      Heart sounds: Normal heart sounds.   Pulmonary:      Effort: No respiratory distress.      Breath sounds: Normal breath sounds.   Abdominal:      Palpations: Abdomen is soft.      Tenderness: There is abdominal tenderness in the left lower quadrant. There is no guarding or rebound.   Musculoskeletal:         General: No tenderness.   Skin:     General: Skin is warm.      Findings: No rash.   Neurological:      General: No focal deficit present.      Mental Status: She is alert.           LAB:  All pertinent labs reviewed and interpreted.  Labs Ordered and Resulted from Time of ED Arrival to Time of ED Departure   BASIC METABOLIC PANEL - Abnormal       Result Value    Sodium 141      Potassium 4.0      Chloride 109 (*)     Carbon Dioxide (CO2) 23      Anion Gap 9      Urea Nitrogen 9      Creatinine 0.75      Calcium 9.3      Glucose 112      GFR Estimate >90     TSH WITH FREE T4 REFLEX - Abnormal    TSH 5.94 (*)    MAGNESIUM - Normal    Magnesium 2.1     TROPONIN I - " Normal    Troponin I <0.01     CBC WITH PLATELETS AND DIFFERENTIAL    WBC Count 9.6      RBC Count 4.30      Hemoglobin 12.5      Hematocrit 38.4      MCV 89      MCH 29.1      MCHC 32.6      RDW 13.1      Platelet Count 278      % Neutrophils 69      % Lymphocytes 19      % Monocytes 9      % Eosinophils 2      % Basophils 0      % Immature Granulocytes 1      NRBCs per 100 WBC 0      Absolute Neutrophils 6.6      Absolute Lymphocytes 1.8      Absolute Monocytes 0.9      Absolute Eosinophils 0.2      Absolute Basophils 0.0      Absolute Immature Granulocytes 0.1      Absolute NRBCs 0.0     T4 FREE       RADIOLOGY:  Reviewed all pertinent imaging. Please see official radiology report.  CT Abdomen Pelvis w Contrast   Final Result   IMPRESSION:    1.  Sigmoid diverticulitis. 2.5 cm ill-defined abscess at the inferior aspect of the inflamed colon. This appears to communicate with a small intramural abscess.   2.  Right renal stone. No ureteral stone or hydronephrosis.                           EKG:    Performed at: 315  Impression: A. fib with RVR.  No acute ischemic changes.  When compared to previous dated June 29, 2022 now in A. fib  A. fib with ventricular rate of 123.  .  QTc 478    I have independently reviewed and interpreted the EKG(s) documented above.        I, Lashonda Vaughn, am serving as a scribe to document services personally performed by Dr. Gregorio Salazar, based on my observation and the provider's statements to me. I, Gregorio Salazar MD attest that Lashonda Vaughn is acting in a scribe capacity, has observed my performance of the services and has documented them in accordance with my direction.    Gregorio Salazar M.D.  Emergency Medicine  [facility] EMERGENCY ROOM  [address] [id number] [phone]  Dept: [phone]       Gregorio Salazar MD  07/12/22 0508

## 2022-07-12 NOTE — TELEPHONE ENCOUNTER
"Arpan Moreau,     Patient is s/p PVI 6/29 with you.  She remains on eliquis 5mg BID, flecanide 150mg BID and diltiazem 120mg daily.  She stopped her prontonix post procedure as \"she doesn't have issues with reflux\".     As of yesterday, she's been in AF, almost 24 hours.    She went to the  ER today with abdominal pain  and nausea which she knew was likely diverticulitis as she has had it in the past.  She was started on ABX treatment and advised she might need surgery.    She couldn't correlate her stomach issues with onset of AF.    She was given IV diltiazem in the ER, EKG showed AF with RVR.    Currently she's home and HRS running in the 110's and she's still in AF.    Recommendations?    Thank you  Brianna"

## 2022-07-12 NOTE — ED TRIAGE NOTES
PT presents with 10 hours of atrial fibrillation after a recent ablation. Pt also has been having left lower quadrant pain and is concerned for diverticulitis       Triage Assessment     Row Name 07/12/22 0316       Triage Assessment (Adult)    Airway WDL WDL       Respiratory WDL    Respiratory WDL WDL       Skin Circulation/Temperature WDL    Skin Circulation/Temperature WDL WDL       Cardiac WDL    Cardiac WDL X;rhythm    Cardiac Rhythm tachycardic;radial pulse irregular       Peripheral/Neurovascular WDL    Peripheral Neurovascular WDL WDL       Cognitive/Neuro/Behavioral WDL    Cognitive/Neuro/Behavioral WDL WDL

## 2022-07-14 ENCOUNTER — TELEPHONE (OUTPATIENT)
Dept: CARDIOLOGY | Facility: CLINIC | Age: 57
End: 2022-07-14

## 2022-07-14 ENCOUNTER — DOCUMENTATION ONLY (OUTPATIENT)
Dept: CARDIOLOGY | Facility: CLINIC | Age: 57
End: 2022-07-14

## 2022-07-14 ENCOUNTER — PREP FOR PROCEDURE (OUTPATIENT)
Dept: CARDIOLOGY | Facility: CLINIC | Age: 57
End: 2022-07-14

## 2022-07-14 DIAGNOSIS — I48.0 PAROXYSMAL ATRIAL FIBRILLATION (H): Primary | ICD-10-CM

## 2022-07-14 RX ORDER — LIDOCAINE 40 MG/G
CREAM TOPICAL
Status: CANCELLED | OUTPATIENT
Start: 2022-07-14

## 2022-07-14 NOTE — TELEPHONE ENCOUNTER
Attempted to contact pt today, for update of rhythm status  Still pending review by Dr Moreau  If pt remains in AF, will page Dr Moreau for recommendations  Left VM for pt to CB  7/14/2022 9:31 AM  Denise Veronica RN

## 2022-07-14 NOTE — PROGRESS NOTES
H&P  PMD []  Received [] OV: [x]carlitos  Date:6-21 Sent LM  []  Teach  [] Orders  I [x] P  [x]  Letter []   COVID  FV/EPIC []  Date:    Home []  External  []  Date: AC: [x]Eliquis  [] Xarelto  [] Warfarin  [] Pradaxa Meds: [x] Needs review of AAD with EP NP  [] Per EP NO chg AAD/Med  [] Per EP Hold:      Pt reports taking anticoagulation appropriately, denies any missed doses in anticoagulation and pt is aware to call if any missed doses prior to CV    1965  Home:[phone] (home) Cell:[phone] (mobile)  Emergency Contact: Dong Edwards [phone]  PCP: Lashonda Arango, [phone]    +++Important patient information for CSC/Cath Lab staff : None+++    [facility] EP Cath Lab Procedure Order   Cardioversion:  Cardioversion  Ordering Provider: Dr Moreau  Ordering Date: 7/14/2022    Diagnosis:  AF  Anticipated Case Duration:  Standard  Scheduling Needs/Timeframe:  Urgent-Next available spot  Scheduling Contact: Please contact pt to schedule date/time for COVID testing and CV, if you are unable to schedule date within the next 24 hours please contact pt to update on scheduling process    Current Device: None None  Device Company/Device Rep Needed for Procedure: None    Pre-Procedural Testing needed: Home COVID testing  Anesthesia:  General    [facility] EP Cath Lab Prep   H&P:  Compled by Carlitos Cox  on 6-21-22 if scheduled within 30 days, pt to schedule with PMD if procedure outside of this timeframe    Pre-op Labs: N/A for procedure    Medical Records Pertinent for Procedure:  PVI w/ADW 6-29-22;  ekg 7-12-22;  cardiac MRI 4-21-21 EF 55%    Patient Education:  Teach with Patient: Will be completed via phone prior to procedure, and letter was also sent to pt via mail/[employer] with written pre-procedural instructions.    Risks Reviewed:     Cardioversion    >90% acute success rate, <10% failure to convert or   reverts shortly after cardioversion.    <1% embolic event of (CVA, pulmonary embolism, or    1. other site).    75% risk for superficial burn.  Risks associated with general anesthesia will be addressed by the Anesthesiology Department    Pre-Procedure Instructions:  NPO after midnight, Remove all jewelry prior to coming in for procedure, Shower prior to arrival, Notified patient of time and date of procedure by CV , Transportation arrangements needed s/p procedure, Post-procedure follow up process, Sedation plan/orders and Pre-procedure letter was sent to pt    Pre-Procedure Medication Instructions:  Instructions given to pt regarding anticoagulants: Eliquis- instructed to continue anticoagulation uninterrupted through their procedure  Instructions given to pt regarding antiarrhythmic medication: Flecainide; Will review medications with EP NP  Instructions for medication, other than anticoagulants/antiarrhythmics listed above, given to pt: Will review medication hold with EPNP    No Known Allergies    Current Outpatient Medications:      apixaban ANTICOAGULANT (ELIQUIS) 5 MG tablet, Take 1 tablet (5 mg) by mouth 2 times daily, Disp: 180 tablet, Rfl: 1     ciprofloxacin (CIPRO) 500 MG tablet, Take 1 tablet (500 mg) by mouth 2 times daily for 14 days, Disp: 28 tablet, Rfl: 0     co-enzyme Q-10 30 mg capsule, [CO-ENZYME Q-10 30 MG CAPSULE] Take 30 mg by mouth daily., Disp: , Rfl:      diltiazem ER COATED BEADS (CARDIZEM CD/CARTIA XT) 120 MG 24 hr capsule, Take 1 capsule (120 mg) by mouth daily, Disp: 90 capsule, Rfl: 3     flecainide (TAMBOCOR) 100 MG tablet, Take 100 mg by mouth 2 times daily Patient discontinued 3 days ago but took a single dose yesterday at 1000 due to intolerable palpitations., Disp: , Rfl:      GLUCOSAMINE SULFATE (GLUCOSAMINE ORAL), Take 1 tablet by mouth daily , Disp: , Rfl:      L.ACID/L.CASEI/B.BIF/B.LUKE/FOS (PROBIOTIC BLEND ORAL), Take 1 tablet by mouth 2 times daily as needed , Disp: , Rfl:      melatonin 3 mg Tab tablet, [MELATONIN 3 MG TAB TABLET] Take 3 mg by mouth  bedtime as needed., Disp: , Rfl:      metroNIDAZOLE (FLAGYL) 500 MG tablet, Take 1 tablet (500 mg) by mouth 2 times daily for 14 days, Disp: 28 tablet, Rfl: 0     multivitamin therapeutic (THERAGRAN) tablet, [MULTIVITAMIN THERAPEUTIC (THERAGRAN) TABLET] Take 1 tablet by mouth daily., Disp: , Rfl:      NON FORMULARY, daily Vitamins - Melaleuca, Disp: , Rfl:     Documentation Date:7/14/2022 1:29 PM  Nancy Arreola RN

## 2022-07-14 NOTE — TELEPHONE ENCOUNTER
Noted.  Phone call to patient, reviewed plan moving forward, she states understanding.  Will place orders for cardioversion and ask our  to contact her asap.

## 2022-07-14 NOTE — TELEPHONE ENCOUNTER
Return call from patient, she states that she continue to be in afib, this is day 3.  She states her rates continue to be high, and quite varialbe 60's to 150's, mostly rates above 110.      Pt states that she is feeling tired, she is having a hard time sleeping with her heart rates being fast during the night, she denies chest pain, does have some lightheadedness with high rates.      She continues Eliquis 5 mg bid, Flecainide 100 mg bid, Diltiazem 120 mg daily.  She is eating and is staying well hydrated.    Pt states that her episodes of afib usually only last several hours, this is the longest she has been in afib.  She also notes that her abdominal pain is getting much better with the antibiotics that she was given, she is taking Cipro 500 mg bid and Flagyl 500 mg bid.  She is hopefull that she will not need surgery.    Will discuss with Dr. Moreau and call patient with recommendations.

## 2022-07-14 NOTE — TELEPHONE ENCOUNTER
Pre-Procedure Education Phone Call    Procedure: CV with with Dr Moreau on 7/15/22    Education: Reviewed Pre-Intra-Post education and instructions reviewed via phone- refer to procedure prep for instructions that were reviewed    COIVD: pt has chosen to bring in photo/copy of at home rapid antigen test, 1-2 days prior to procedure    Pre-Op: completed and in Epic    Medications: Pt verbalized understanding of medication instructions pre procedure    Important patient information for staff: None    7/14/2022 3:57 PM  Denise Veronica RN

## 2022-07-14 NOTE — TELEPHONE ENCOUNTER
Seven Moreau MD  You 19 minutes ago (1:00 PM)     AW    Thanks Nancy - 7/12/2022 ECG shows AF.  Given she has remained in AF for a couple days let's plan for DCCV.  We can continue present medications, including continued antibiotics for diverticulitis.  In case of ongoing recurrences in the early post-ablation phase, we may replace flecainide with alternate AAD (sotalol vs amiodarone).     Thank you!   Nagi    Message text

## 2022-07-15 ENCOUNTER — TELEPHONE (OUTPATIENT)
Dept: CARDIOLOGY | Facility: CLINIC | Age: 57
End: 2022-07-15

## 2022-07-15 NOTE — TELEPHONE ENCOUNTER
Arpan Moreau,     Patient s/p PVI 6/29.  Plan to cardiovert today as she had a long episode of AF.  She converted on her own last night and is feeling well today.  Her diverticulitis is vastly improved today.    Told patient to continue current medications, flecainide 100mg BID, eliquis 5mg BID and diltiazem 120mg daily.  Make sure to stay hydrated and keep track of further episodes.    She will keep her 6 week follow up and call us before then with further issues or concerns.    Any further recommendations?

## 2022-07-19 NOTE — TELEPHONE ENCOUNTER
Seven Moreau MD Westlund, Jessica, RN  Caller: Unspecified (4 days ago,  8:17 AM)  Okay - sounds good Brianna.  Thank you.  We can see how she is doing at her 6wk FUV.     Nagi

## 2022-07-25 ENCOUNTER — NURSE TRIAGE (OUTPATIENT)
Dept: NURSING | Facility: CLINIC | Age: 57
End: 2022-07-25

## 2022-07-25 NOTE — TELEPHONE ENCOUNTER
"Pt is calling in about intermittent discomfort on the right side of her abdomen that started yesterday. Pt rates discomfort mild, at  \"1-2\".  Pt had blood in her urine yesterday, but none today. Pt denies any severe abdominal pain, chest pain, fever, vomiting, or diarrhea. Pt denies any bloody, black,  or tarry stools. Pt is worried because she was just recently discharged with Diverticulitis, and A-fib.  Care advice given:    REST:   * Lie down and rest until you feel better.    FLUIDS: Sip clear fluids only (e.g., water, flat soft drinks or 1/2 strength fruit juice) until the pain has been gone for over 2 hours. Then slowly return to a regular diet.    DIET:  * Slowly advance diet from clear liquids to a bland diet.  * Avoid alcohol or caffeinated beverages.  * Avoid greasy or fatty foods.    Per protocol pt should be able to monitor this at home. Pt was advised to:    CALL BACK IF:  * Abdominal pain is constant and present for more than 2 hours  * Abdominal pains come and go and are present for more than 24 hours  * You are pregnant  * You become worse    Pt verbalized understanding.    Huber Coronel RN on 7/25/2022 at 3:30 PM      Reason for Disposition    Mild abdominal pain    Additional Information    Negative: Passed out (i.e., fainted, collapsed and was not responding)    Negative: Shock suspected (e.g., cold/pale/clammy skin, too weak to stand, low BP, rapid pulse)    Negative: Sounds like a life-threatening emergency to the triager    Negative: Chest pain    Negative: Pain is mainly in upper abdomen (if needed ask: 'is it mainly above the belly button?')    Negative: Abdominal pain and pregnant > 20 weeks    Negative: Abdominal pain and pregnant < 20 weeks    Negative: SEVERE abdominal pain (e.g., excruciating)    Negative: Vomiting red blood or black (coffee ground) material    Negative: Bloody, black, or tarry bowel movements (Exception: chronic-unchanged black-grey bowel movements and is taking " iron pills or Pepto-bismol)    Negative: Constant abdominal pain lasting > 2 hours    Negative: Vomiting bile (green color)    Negative: Patient sounds very sick or weak to the triager    Negative: Vomiting and abdomen looks much more swollen than usual    Negative: White of the eyes have turned yellow (i.e., jaundice)    Negative: Blood in urine (red, pink, or tea-colored)    Negative: Fever > 103 F (39.4 C)    Negative: Fever > 101 F (38.3 C) and over 60 years of age    Negative: Fever > 100.0 F (37.8 C) and has diabetes mellitus or a weak immune system (e.g., HIV positive, cancer chemotherapy, organ transplant, splenectomy, chronic steroids)    Negative: Fever > 100.0 F (37.8 C) and bedridden (e.g., nursing home patient, stroke, chronic illness, recovering from surgery)    Negative: Pregnant or could be pregnant (i.e., missed last menstrual period)    Negative: MODERATE OR MILD pain that comes and goes (cramps) lasts > 24 hours    Negative: Unusual vaginal discharge    Negative: Age > 60 years    Negative: Patient wants to be seen    Negative: Abdominal pain is a chronic symptom (recurrent or ongoing AND lasting > 4 weeks)    Negative: Pain with sexual intercourse (dyspareunia)    Protocols used: ABDOMINAL PAIN - FEMALE-A-OH

## 2022-08-10 ENCOUNTER — OFFICE VISIT (OUTPATIENT)
Dept: CARDIOLOGY | Facility: CLINIC | Age: 57
End: 2022-08-10
Payer: COMMERCIAL

## 2022-08-10 VITALS
RESPIRATION RATE: 16 BRPM | BODY MASS INDEX: 36.32 KG/M2 | WEIGHT: 218 LBS | SYSTOLIC BLOOD PRESSURE: 103 MMHG | HEIGHT: 65 IN | HEART RATE: 78 BPM | DIASTOLIC BLOOD PRESSURE: 75 MMHG

## 2022-08-10 DIAGNOSIS — Z86.79 S/P ABLATION OF ATRIAL FIBRILLATION: ICD-10-CM

## 2022-08-10 DIAGNOSIS — I48.0 PAROXYSMAL ATRIAL FIBRILLATION (H): Primary | ICD-10-CM

## 2022-08-10 DIAGNOSIS — I48.3 TYPICAL ATRIAL FLUTTER (H): ICD-10-CM

## 2022-08-10 DIAGNOSIS — Z98.890 S/P ABLATION OF ATRIAL FIBRILLATION: ICD-10-CM

## 2022-08-10 PROCEDURE — 99214 OFFICE O/P EST MOD 30 MIN: CPT | Performed by: NURSE PRACTITIONER

## 2022-08-10 RX ORDER — FLECAINIDE ACETATE 150 MG/1
75 TABLET ORAL 2 TIMES DAILY
COMMUNITY
Start: 2022-07-22 | End: 2022-09-30

## 2022-08-10 NOTE — LETTER
8/10/2022    Lashonda Arango MD  [address] [facility] [address]    RE: Tania Kaufman       Dear Colleague,     I had the pleasure of seeing Tania Kaufman in the [facility].    HEART CARE ELECTROPHYSIOLOGY NOTE      [facility]  [phone]      Assessment/Recommendations   Assessment/Plan:  Paroxysmal Atrial Fibrillation and typical appearing flutter: 4-day episode of AF shortly after PVI, in the setting of acute diverticulitis.  Spontaneously converted back to sinus rhythm prior to planned cardioversion.  She has not had any further arrhythmia.  Otherwise, she has had an uneventful recovery from ablation.  She was reassured that AF during the early phase of recovering from ablation is not uncommon.  Decrease flecainide to 75 mg twice daily.  Continue diltiazem 120 mg daily.  If she has no further AF, will discontinue flecainide and diltiazem.    She has a [id number] score of 1 for female gender.  HAS-BLED score of 0.   Eliquis 5 mg twice daily started on 6/8/2022 in preparation for ablation, to continue for at least 3 months post ablation.  She reports no missed doses or bleeding issues.      We reviewed her abdominal CT.  Kidney stone mentioned in the report was located.  She will follow-up with her urologist.    Follow-up 3 months post PVI     History of Present Illness/Subjective    HPI: Tania Kaufman is a 56 year old female who comes in for EP follow up of atrial fibrillation and history and physical prior to pulmonary vein isolation ablation.  She has a history of atrial fibrillation, anxiety, diverticulitis, IBS, and recurrent kidney stones.      She was diagnosed with atrial fibrillation in February 2020, though began having palpitations in the summer 2019.  Symptoms consist of tachypalpitations, dyspnea on exertion, and lightheadedness.  Episodes were occurring every 1 to 2 months, but increased in frequency and duration, occurring almost daily  and lasting up to 6 hours.   Echo shows normal structure and function.  Event monitor documented atrial fibrillation with rapid ventricular response.   She was started on low-dose metoprolol succinate and Eliquis on 2/21/2020 with subsequent decrease in AF frequency and symptom severity, but with hypotension.  Metoprolol was switched to diltiazem.   She again had an increase in AF frequency.  Nuclear stress test was negative for ischemia.  She was started on low-dose flecainide, but continued to have frequent episodes of A. fib, so flecainide was ultimately increased to 150 mg twice daily.  She underwent pulmonary vein isolation ablation with Dr. Moreau on 6/29/2022 with RF WACA pulmonary vein isolation and right atrial CTI flutter line.  She had recurrent AF on 7/11/2022.  She was planned for cardioversion, but converted back to sinus rhythm spontaneously on 7/14/2022.  During this time, she was also treated in the ED for diverticulitis.  She was on low-dose daily aspirin for stroke prophylaxis, but switched to Eliquis on 6/8/2022 in preparation for ablation.     Eden states that she is now feeling very well.  She has not had any further AF.  She has been walking and working out without any shortness of breath.  She reports an uneventful recovery from ablation, denies groin site issues, heartburn, difficulty swallowing, or neurologic changes.  She denies chest discomfort, palpitations, abdominal fullness/bloating or peripheral edema, shortness of breath, paroxysmal nocturnal dyspnea, orthopnea, lightheadedness, pre-syncope, or syncope.       Cardiographics (EKG personally reviewed):  EKG done 6/21/2022 shows sinus rhythm at 68 bpm, first-degree AV block,  ms, QT/QTc 436/463 ms  EKG done 10/6/2021 shows typical atrial flutter with variable block 94 bpm     Event monitor worn for 1 week beginning on 2/17/2020 shows paroxysmal atrial fibrillation with rapid ventricular response 105-160 bpm.  Symptoms noted for  "all episodes of A. fib.  AF burden approximately 6%.  No significant bradycardia or pauses.  No significant ventricular arrhythmia.     ECHO done 2/24/2020:    Left ventricle ejection fraction is normal. The calculated left ventricular ejection fraction is 58%.    The right ventricle is mildly dilated. The systolic function is mildly reduced.    No hemodynamically significant valvular heart abnormalities.    No previous study for comparison.     NM stress test done December 21, 2020:     There is no prior study for comparison.     The nuclear stress test is negative for inducible myocardial ischemia or infarction.     The patient is at a low risk of future cardiac ischemic events.     The left ventricular ejection fraction at rest is greater than 70%.     The patient's exercise capacity is average.  No chest pain with activity.     Left ventricular function is normal.     Stress electrocardiogram was negative for inducible ischemia.    MRA Pulmonary Veins done 4/21/2021: EF 55%    I have reviewed and updated the patient's Past Medical History, Social History, Family History and Medication List.  Outside records personally reviewed.     Physical Examination  Review of Systems   Vitals: /75 (BP Location: Right arm, Patient Position: Sitting, Cuff Size: Adult Large)   Pulse 78   Resp 16   Ht 1.651 m (5' 5\")   Wt 98.9 kg (218 lb)   BMI 36.28 kg/m    BMI= Body mass index is 36.28 kg/m .  Wt Readings from Last 3 Encounters:   08/10/22 98.9 kg (218 lb)   07/12/22 99.8 kg (220 lb)   06/29/22 100 kg (220 lb 8 oz)       General Appearance:   Alert, well-appearing and in no acute distress.   HEENT: Atraumatic, normocephalic.  No scleral icterus, normal conjunctivae, EOMs intact, PERRL.  Wearing mask.   Chest/Lungs:   Chest symmetric, spine straight.  Respirations unlabored.  Lungs are clear to auscultation.   Cardiovascular:   Regular rate and rhythm.  Normal first and second heart sounds with no murmurs, rubs, or " gallops; radial and posterior tibial pulses are intact, No edema.   Abdomen:  Soft, nondistended, bowel sounds present.   Extremities: No cyanosis or clubbing.   Musculoskeletal: Moves all extremities.     Skin: Warm, dry, intact.    Neurologic: Mood and affect are appropriate.  Alert and oriented to person, place, time, and situation.     ROS: 10 point ROS neg other than the symptoms noted above in the HPI.         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      PONV (postoperative nausea and vomiting)      PONV (postoperative nausea and vomiting)      Past Surgical History:   Procedure Laterality Date     EP ABLATION FOCAL AFIB N/A 6/29/2022    Procedure: EP Ablation Atrial Fibrilation;  Surgeon: Seven Moreau MD;  Location:  HEART CARDIAC CATH LAB     NO PAST SURGERIES       OTHER SURGICAL HISTORY      wisdom teeth removal     [place] CYSTO/URETERO W/LITHOTRIPSY &INDWELL STENT INSRT Right 6/11/2019    Procedure: CYSTOSCOPY, RIGHT RETROGRADE,  RIGHT URETEROSCOPY WITH LASER LITHOTRIPSY AND STENT INSERTION;  Surgeon: John Gutierrez MD;  Location[facility];  Service: Urology     Family History   Problem Relation Age of Onset     Cancer Mother         skin     Pacemaker Mother         4 open heart surgeries      Diverticulitis Father      Breast Cancer Other 40.00        great aunt     Urolithiasis Brother         recurrent     Gout No family hx of         Social  History     Socioeconomic History     Marital status:      Spouse name: Not on file     Number of children: Not on file     Years of education: Not on file     Highest education level: Not on file   Occupational History     Not on file   Tobacco Use     Smoking status: Never Smoker     Smokeless tobacco: Never Used   Substance and Sexual Activity     Alcohol use: Yes     Comment: Alcoholic Drinks/day: social only     Drug use: No     Sexual activity: Not on file   Other Topics Concern     Not on file   Social History Narrative     Not on file     Social Determinants of Health     Financial Resource Strain: Not on file   Food Insecurity: Not on file   Transportation Needs: Not on file   Physical Activity: Not on file   Stress: Not on file   Social Connections: Not on file   Intimate Partner Violence: Not on file   Housing Stability: Not on file           Medications  Allergies   Current Outpatient Medications   Medication Sig Dispense Refill     apixaban ANTICOAGULANT (ELIQUIS) 5 MG tablet Take 1 tablet (5 mg) by mouth 2 times daily 180 tablet 1     co-enzyme Q-10 30 mg capsule [CO-ENZYME Q-10 30 MG CAPSULE] Take 30 mg by mouth daily.       diltiazem ER COATED BEADS (CARDIZEM CD/CARTIA XT) 120 MG 24 hr capsule Take 1 capsule (120 mg) by mouth daily 90 capsule 3     flecainide (TAMBOCOR) 150 MG tablet Take 75 mg by mouth 2 times daily       GLUCOSAMINE SULFATE (GLUCOSAMINE ORAL) Take 1 tablet by mouth daily        L.ACID/L.CASEI/B.BIF/B.LUKE/FOS (PROBIOTIC BLEND ORAL) Take 1 tablet by mouth 2 times daily as needed        melatonin 3 mg Tab tablet [MELATONIN 3 MG TAB TABLET] Take 3 mg by mouth bedtime as needed.       multivitamin therapeutic (THERAGRAN) tablet [MULTIVITAMIN THERAPEUTIC (THERAGRAN) TABLET] Take 1 tablet by mouth daily.       NON FORMULARY daily Vitamins - Melaleuca       No Known Allergies     Severe nausea and vomiting with anesthesia      Lab Results    Chemistry/lipid CBC Cardiac  Enzymes/BNP/TSH/INR   Recent Labs   Lab Test 09/11/14  0821   CHOL 178   HDL 75   LDL 87   TRIG 82     Recent Labs   Lab Test 09/11/14  0821   LDL 87     Recent Labs   Lab Test 07/12/22  0326 06/29/22  0559    137   POTASSIUM 4.0 4.0   CHLORIDE 109* 108   CO2 23 26   ANIONGAP 9 3    110*   BUN 9 10   CR 0.75 0.94   INDIA 9.3 8.9      CBC RESULTS: Recent Labs   Lab Test 07/12/22  0326   WBC 9.6   RBC 4.30   HGB 12.5   HCT 38.4   MCV 89   MCH 29.1   MCHC 32.6   RDW 13.1         No results for input(s): TROPONINI in the last 87530 hours.  No results for input(s): BNP, NTBNPI, NTBNP in the last 21873 hours.  Lab Results   Component Value Date    TSH 5.94 07/12/2022        32 minutes were spent on the date of encounter performing chart review, history and exam, documentation, and further activities as noted above.          Thank you for allowing me to participate in the care of your patient.      Sincerely,     TAMIA Presley CNP     [facility]  cc:   TAMIA Presley CNP  [address], [address]  [place] [address]

## 2022-08-10 NOTE — PROGRESS NOTES
HEART CARE ELECTROPHYSIOLOGY NOTE      [facility]  [phone]      Assessment/Recommendations   Assessment/Plan:  Paroxysmal Atrial Fibrillation and typical appearing flutter: 4-day episode of AF shortly after PVI, in the setting of acute diverticulitis.  Spontaneously converted back to sinus rhythm prior to planned cardioversion.  She has not had any further arrhythmia.  Otherwise, she has had an uneventful recovery from ablation.  She was reassured that AF during the early phase of recovering from ablation is not uncommon.  Decrease flecainide to 75 mg twice daily.  Continue diltiazem 120 mg daily.  If she has no further AF, will discontinue flecainide and diltiazem.    She has a [id number] score of 1 for female gender.  HAS-BLED score of 0.   Eliquis 5 mg twice daily started on 6/8/2022 in preparation for ablation, to continue for at least 3 months post ablation.  She reports no missed doses or bleeding issues.      We reviewed her abdominal CT.  Kidney stone mentioned in the report was located.  She will follow-up with her urologist.    Follow-up 3 months post PVI     History of Present Illness/Subjective    HPI: Tania Kaufman is a 56 year old female who comes in for EP follow up of atrial fibrillation and history and physical prior to pulmonary vein isolation ablation.  She has a history of atrial fibrillation, anxiety, diverticulitis, IBS, and recurrent kidney stones.      She was diagnosed with atrial fibrillation in February 2020, though began having palpitations in the summer 2019.  Symptoms consist of tachypalpitations, dyspnea on exertion, and lightheadedness.  Episodes were occurring every 1 to 2 months, but increased in frequency and duration, occurring almost daily and lasting up to 6 hours.   Echo shows normal structure and function.  Event monitor documented atrial fibrillation with rapid ventricular response.   She was started on low-dose metoprolol succinate and Eliquis  on 2/21/2020 with subsequent decrease in AF frequency and symptom severity, but with hypotension.  Metoprolol was switched to diltiazem.   She again had an increase in AF frequency.  Nuclear stress test was negative for ischemia.  She was started on low-dose flecainide, but continued to have frequent episodes of A. fib, so flecainide was ultimately increased to 150 mg twice daily.  She underwent pulmonary vein isolation ablation with Dr. Moreau on 6/29/2022 with RF WACA pulmonary vein isolation and right atrial CTI flutter line.  She had recurrent AF on 7/11/2022.  She was planned for cardioversion, but converted back to sinus rhythm spontaneously on 7/14/2022.  During this time, she was also treated in the ED for diverticulitis.  She was on low-dose daily aspirin for stroke prophylaxis, but switched to Eliquis on 6/8/2022 in preparation for ablation.     Eden states that she is now feeling very well.  She has not had any further AF.  She has been walking and working out without any shortness of breath.  She reports an uneventful recovery from ablation, denies groin site issues, heartburn, difficulty swallowing, or neurologic changes.  She denies chest discomfort, palpitations, abdominal fullness/bloating or peripheral edema, shortness of breath, paroxysmal nocturnal dyspnea, orthopnea, lightheadedness, pre-syncope, or syncope.       Cardiographics (EKG personally reviewed):  EKG done 6/21/2022 shows sinus rhythm at 68 bpm, first-degree AV block,  ms, QT/QTc 436/463 ms  EKG done 10/6/2021 shows typical atrial flutter with variable block 94 bpm     Event monitor worn for 1 week beginning on 2/17/2020 shows paroxysmal atrial fibrillation with rapid ventricular response 105-160 bpm.  Symptoms noted for all episodes of A. fib.  AF burden approximately 6%.  No significant bradycardia or pauses.  No significant ventricular arrhythmia.     ECHO done 2/24/2020:    Left ventricle ejection fraction is normal. The  "calculated left ventricular ejection fraction is 58%.    The right ventricle is mildly dilated. The systolic function is mildly reduced.    No hemodynamically significant valvular heart abnormalities.    No previous study for comparison.     NM stress test done December 21, 2020:     There is no prior study for comparison.     The nuclear stress test is negative for inducible myocardial ischemia or infarction.     The patient is at a low risk of future cardiac ischemic events.     The left ventricular ejection fraction at rest is greater than 70%.     The patient's exercise capacity is average.  No chest pain with activity.     Left ventricular function is normal.     Stress electrocardiogram was negative for inducible ischemia.    MRA Pulmonary Veins done 4/21/2021: EF 55%    I have reviewed and updated the patient's Past Medical History, Social History, Family History and Medication List.  Outside records personally reviewed.     Physical Examination  Review of Systems   Vitals: /75 (BP Location: Right arm, Patient Position: Sitting, Cuff Size: Adult Large)   Pulse 78   Resp 16   Ht 1.651 m (5' 5\")   Wt 98.9 kg (218 lb)   BMI 36.28 kg/m    BMI= Body mass index is 36.28 kg/m .  Wt Readings from Last 3 Encounters:   08/10/22 98.9 kg (218 lb)   07/12/22 99.8 kg (220 lb)   06/29/22 100 kg (220 lb 8 oz)       General Appearance:   Alert, well-appearing and in no acute distress.   HEENT: Atraumatic, normocephalic.  No scleral icterus, normal conjunctivae, EOMs intact, PERRL.  Wearing mask.   Chest/Lungs:   Chest symmetric, spine straight.  Respirations unlabored.  Lungs are clear to auscultation.   Cardiovascular:   Regular rate and rhythm.  Normal first and second heart sounds with no murmurs, rubs, or gallops; radial and posterior tibial pulses are intact, No edema.   Abdomen:  Soft, nondistended, bowel sounds present.   Extremities: No cyanosis or clubbing.   Musculoskeletal: Moves all extremities.   "   Skin: Warm, dry, intact.    Neurologic: Mood and affect are appropriate.  Alert and oriented to person, place, time, and situation.     ROS: 10 point ROS neg other than the symptoms noted above in the HPI.         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[place]      PONV (postoperative nausea and vomiting)      PONV (postoperative nausea and vomiting)      Past Surgical History:   Procedure Laterality Date     EP ABLATION FOCAL AFIB N/A 6/29/2022    Procedure: EP Ablation Atrial Fibrilation;  Surgeon: Seven Moreau MD;  Location:  HEART CARDIAC CATH LAB     NO PAST SURGERIES       OTHER SURGICAL HISTORY      wisdom teeth removal     [place] CYSTO/URETERO W/LITHOTRIPSY &INDWELL STENT INSRT Right 6/11/2019    Procedure: CYSTOSCOPY, RIGHT RETROGRADE,  RIGHT URETEROSCOPY WITH LASER LITHOTRIPSY AND STENT INSERTION;  Surgeon: John Gutierrez MD;  Location: [facility];  Service: Urology     Family History   Problem Relation Age of Onset     Cancer Mother         skin     Pacemaker Mother         4 open heart surgeries      Diverticulitis Father      Breast Cancer Other 40.00        great aunt     Urolithiasis Brother         recurrent     Gout No family hx of         Social History     Socioeconomic History     Marital status:      Spouse name: Not on file     Number of children: Not on file     Years of education: Not on file     Highest education level: Not on file    Occupational History     Not on file   Tobacco Use     Smoking status: Never Smoker     Smokeless tobacco: Never Used   Substance and Sexual Activity     Alcohol use: Yes     Comment: Alcoholic Drinks/day: social only     Drug use: No     Sexual activity: Not on file   Other Topics Concern     Not on file   Social History Narrative     Not on file     Social Determinants of Health     Financial Resource Strain: Not on file   Food Insecurity: Not on file   Transportation Needs: Not on file   Physical Activity: Not on file   Stress: Not on file   Social Connections: Not on file   Intimate Partner Violence: Not on file   Housing Stability: Not on file           Medications  Allergies   Current Outpatient Medications   Medication Sig Dispense Refill     apixaban ANTICOAGULANT (ELIQUIS) 5 MG tablet Take 1 tablet (5 mg) by mouth 2 times daily 180 tablet 1     co-enzyme Q-10 30 mg capsule [CO-ENZYME Q-10 30 MG CAPSULE] Take 30 mg by mouth daily.       diltiazem ER COATED BEADS (CARDIZEM CD/CARTIA XT) 120 MG 24 hr capsule Take 1 capsule (120 mg) by mouth daily 90 capsule 3     flecainide (TAMBOCOR) 150 MG tablet Take 75 mg by mouth 2 times daily       GLUCOSAMINE SULFATE (GLUCOSAMINE ORAL) Take 1 tablet by mouth daily        L.ACID/L.CASEI/B.BIF/B.LUKE/FOS (PROBIOTIC BLEND ORAL) Take 1 tablet by mouth 2 times daily as needed        melatonin 3 mg Tab tablet [MELATONIN 3 MG TAB TABLET] Take 3 mg by mouth bedtime as needed.       multivitamin therapeutic (THERAGRAN) tablet [MULTIVITAMIN THERAPEUTIC (THERAGRAN) TABLET] Take 1 tablet by mouth daily.       NON FORMULARY daily Vitamins - Melaleuca       No Known Allergies     Severe nausea and vomiting with anesthesia      Lab Results    Chemistry/lipid CBC Cardiac Enzymes/BNP/TSH/INR   Recent Labs   Lab Test 09/11/14  0821   CHOL 178   HDL 75   LDL 87   TRIG 82     Recent Labs   Lab Test 09/11/14  0821   LDL 87     Recent Labs   Lab Test 07/12/22  0326 06/29/22  0559     137   POTASSIUM 4.0 4.0   CHLORIDE 109* 108   CO2 23 26   ANIONGAP 9 3    110*   BUN 9 10   CR 0.75 0.94   INDIA 9.3 8.9      CBC RESULTS: Recent Labs   Lab Test 07/12/22  0326   WBC 9.6   RBC 4.30   HGB 12.5   HCT 38.4   MCV 89   MCH 29.1   MCHC 32.6   RDW 13.1         No results for input(s): TROPONINI in the last 51667 hours.  No results for input(s): BNP, NTBNPI, NTBNP in the last 39654 hours.  Lab Results   Component Value Date    TSH 5.94 07/12/2022        32 minutes were spent on the date of encounter performing chart review, history and exam, documentation, and further activities as noted above.

## 2022-08-10 NOTE — PATIENT INSTRUCTIONS
Tania Kaufman,    It was a pleasure to see you today at the [facility].     My recommendations after this visit include:    Decrease flecainide to 75 mg twice daily    Continue diltiazem and Eliquis    Follow up in 6-8 weeks    Florecita Cox, CNP  [facility], Electrophysiology  [phone]  EP nurses [phone]

## 2022-08-26 ENCOUNTER — TELEPHONE (OUTPATIENT)
Dept: UROLOGY | Facility: CLINIC | Age: 57
End: 2022-08-26

## 2022-08-26 DIAGNOSIS — N20.0 CALCULUS OF KIDNEY: Primary | ICD-10-CM

## 2022-08-26 NOTE — TELEPHONE ENCOUNTER
[facility] Call Center    Phone Message    May a detailed message be left on voicemail: yes     Reason for Call: Other: Patient called and spoke with writer, she states she would like to speak to care team regarding her recent scans. Please call patient to discuss further      Action Taken: Message routed to:  [facility] ([facility] Urology     Travel Screening: Not Applicable

## 2022-08-30 ENCOUNTER — HOSPITAL ENCOUNTER (OUTPATIENT)
Dept: CT IMAGING | Facility: CLINIC | Age: 57
Discharge: HOME | End: 2022-08-30
Attending: PHYSICIAN ASSISTANT | Admitting: PHYSICIAN ASSISTANT
Payer: COMMERCIAL

## 2022-08-30 DIAGNOSIS — N20.0 CALCULUS OF KIDNEY: ICD-10-CM

## 2022-08-30 PROCEDURE — 74176 CT ABD & PELVIS W/O CONTRAST: CPT

## 2022-08-31 ENCOUNTER — VIRTUAL VISIT (OUTPATIENT)
Dept: UROLOGY | Facility: CLINIC | Age: 57
End: 2022-08-31
Payer: COMMERCIAL

## 2022-08-31 DIAGNOSIS — R82.994 HYPERCALCIURIA: ICD-10-CM

## 2022-08-31 DIAGNOSIS — R82.998 HIGH URINE SODIUM: ICD-10-CM

## 2022-08-31 DIAGNOSIS — N20.0 CALCULUS OF KIDNEY: Primary | ICD-10-CM

## 2022-08-31 DIAGNOSIS — R34 LOW URINE OUTPUT: ICD-10-CM

## 2022-08-31 PROCEDURE — 99214 OFFICE O/P EST MOD 30 MIN: CPT | Mod: 95 | Performed by: PHYSICIAN ASSISTANT

## 2022-08-31 RX ORDER — ONDANSETRON 4 MG/1
4 TABLET, ORALLY DISINTEGRATING ORAL EVERY 8 HOURS PRN
Qty: 15 TABLET | Refills: 1 | Status: SHIPPED | OUTPATIENT
Start: 2022-08-31 | End: 2022-11-23

## 2022-08-31 RX ORDER — OXYCODONE HYDROCHLORIDE 5 MG/1
5 TABLET ORAL EVERY 6 HOURS PRN
Qty: 12 TABLET | Refills: 0 | Status: SHIPPED | OUTPATIENT
Start: 2022-08-31 | End: 2022-09-03

## 2022-08-31 ASSESSMENT — PAIN SCALES - GENERAL: PAINLEVEL: NO PAIN (0)

## 2022-08-31 NOTE — PATIENT INSTRUCTIONS
Patient Stated Goal: Prevent further stones  Steps for collecting a 24 hour urine specimen    Please follow the directions carefully. All urine voided for a 24-hour period needs to be collected into the jug.   Pick the most convenient day with your schedule, perhaps on a weekend or a day off.    Restrict dietary intake of sodium to 1800mg per day or less for 4 consecutive days.    Start the 24 hour urine collection on day 4 of the sodium restrictive diet.    Start your Diet Log the day before collection and continue on the day of urine collection.  You MUST bring Diet Log with you on follow up visit to discuss results.  One 24hr urine collection while on dietary sodium restriction challenge    STEP 1  Empty your bladder completely into the toilet. This will be your start time. Write your full legal name, start date and time on the jug label.  Collection start and stop times need to match exactly!  For example:  6 am to 6 am.    STEP 2  The next time you urinate, empty your bladder directly into the jug or collection hat and pour urine into the jug.  Screw the lid back onto the jug.  Do not spill!    STEP 3  Place the jug in the refrigerator or a cooler with ice during the collection period.  Failure to keep it cool could cause inaccurate test results. DO NOT Freeze.     STEP 4  Continue collecting all urine into the jug for the rest of the day, for the full 24 hours.  DO NOT stop early or go over 24 hours!    STEP 5  Exactly 24 hours from start of collection, write your full legal name, stop date and time on the jug label.   Collection start and stop times need to match exactly!  For example:  6 am to 6 am.  Failure to label correctly will result in recollection of urine specimen.    STEP 6  Return each jug within 24 hours after final urination.    STEP 7  Drop off jug locations:   Drop off at [facility] outpatient lab  STEP 8  Please call [facility] after return of your final jug to schedule your  follow-up visit. [phone]

## 2022-08-31 NOTE — PROGRESS NOTES
Patient is roomed via telephone for a virtual visit.  Patient confirmed she is in the [facility] at the time of this appointment.  Patient understands that this virtual visit is billable and agree to proceed with appointment.

## 2022-08-31 NOTE — PROGRESS NOTES
Assessment/Plan:    Assessment & Plan   Tnaia was seen today for flank pain.    Diagnoses and all orders for this visit:    Calculus of kidney  -     Patient Stated Goal: Prevent further stones  -     oxyCODONE (ROXICODONE) 5 MG tablet; Take 1 tablet (5 mg) by mouth every 6 hours as needed for pain  -     ondansetron (ZOFRAN ODT) 4 MG ODT tab; Take 1 tablet (4 mg) by mouth every 8 hours as needed    Hypercalciuria    High urine sodium    Low urine output    Stone Management Plan  Stone Management 10/10/2019 8/31/2022   Urinary Tract Infection No suspicion of infection No suspicion of infection   Renal Colic Asymptomatic at this time Asymptomatic at this time   Renal Failure No suspicion of renal failure No suspicion of renal failure   Current CT date - 8/30/2022   Right sided stones? - Yes   R Number of ureteral stones - No ureteral stones   R Number of kidney stones  - 1   R GSD of kidney stones - 4 - 10   R Hydronephrosis - None   R Stone Event No current event No current event   R Current Plan Observe Observe   Observe rationale Limited stone burden with good prognosis for spontaneous passage Limited stone burden with good prognosis for spontaneous passage   Left sided stones? - No   L Stone Event No current event No current event   L Current Plan Observe -   Observe rationale Limited stone burden with good prognosis for spontaneous passage -         PLAN    57 yo F with history of long standing, recurrent calcium based kidney stones, previous surgical stone interventions. Documented risk factors of low volume, hypercalciuria, and high urine sodium. Nonbostructing right renal stone.    Will proceed with updated 24 hour urine collection, she still has the kit we gave her in 2019. Also discussed management options for stone including observation vs surgical clearance with URS.    Telephone call duration: 22 minutes  30 minutes spent on the date of the encounter doing chart review, history and exam, documentation  and further activities per the note    Sigrid Alvarez PA-C  [facility]    HPI  Ms. Tania Kaufman is a 56 year old  female who is being evaluated via a billable telephone visit by [facility] [place] for unanticipated visit with acute exacerbation of chronic stone disease.      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 She has previously participated in stone risk evaluation with identified factors but is no longer adherent to recommendations. She has identified modifiable stone risks including:  low urine volume, unclassified hypercalciuria and high urine sodium. She has identified non-modifiable stone risks including:  bilateral stones.    She was last seen in 2019 for prevention workup and was to return with sodium restriction 24 hour urine collection. She required ER visit in July for heart palpitations following recent ablation for A-fib. She also noted left lower abdominal pain. Imaging demonstrated acute diverticulitis with small abscess. Incidentally, a nonobstructing right renal stone was noted. She was treated conservatively with oral antibiotics and had recent follow up CT guided by her PCP.    She has had twinges of right flank pain. She denies symptoms of fever, chills, flank pain, nausea, vomiting, urinary frequency and dysuria.    CT scan from 8/30/22 is personally reviewed and demonstrates a nonobstructing 5 mm right mid pole renal stone, grown from 2019 imaging. No hydronephrosis. No ureteral or bladder stones. Previous acute diverticulitis of sigmoid colon resolved.    ROS   Review of systems is negative except for HPI.    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[place]      PONV (postoperative nausea and vomiting)      PONV (postoperative nausea and vomiting)        Past Surgical History:   Procedure Laterality Date     EP ABLATION FOCAL AFIB N/A 6/29/2022    Procedure: EP Ablation Atrial Fibrilation;  Surgeon: Seven Moreau MD;  Location:  [facility]     NO PAST SURGERIES       OTHER SURGICAL HISTORY      wisdom teeth removal     WA CYSTO/URETERO W/LITHOTRIPSY &INDWELL STENT INSRT Right 6/11/2019    Procedure: CYSTOSCOPY, RIGHT RETROGRADE,  RIGHT URETEROSCOPY WITH LASER LITHOTRIPSY AND STENT INSERTION;  Surgeon: John Gutierrez MD;  Location: [facility];  Service: Urology       Current Outpatient Medications   Medication Sig Dispense Refill     ondansetron (ZOFRAN ODT) 4 MG ODT tab Take 1 tablet (4 mg) by mouth every 8 hours as needed 15 tablet 1     oxyCODONE (ROXICODONE) 5 MG tablet Take 1 tablet (5 mg) by mouth every 6 hours as needed for pain 12 tablet 0     apixaban ANTICOAGULANT (ELIQUIS) 5 MG tablet Take 1 tablet (5 mg) by mouth 2 times daily 180 tablet 1     co-enzyme Q-10 30 mg capsule [CO-ENZYME Q-10 30 MG CAPSULE] Take 30 mg by mouth daily.       diltiazem ER COATED BEADS (CARDIZEM CD/CARTIA XT) 120 MG 24 hr capsule Take 1 capsule (120 mg) by mouth daily 90 capsule 3     flecainide (TAMBOCOR) 150 MG tablet Take 75 mg by mouth 2 times daily       GLUCOSAMINE SULFATE (GLUCOSAMINE ORAL) Take 1 tablet by mouth daily        L.ACID/L.CASEI/B.BIF/B.LUKE/FOS (PROBIOTIC BLEND ORAL) Take 1 tablet by mouth 2 times daily as needed        melatonin 3 mg Tab tablet [MELATONIN 3 MG TAB TABLET] Take 3 mg by mouth bedtime as needed.       multivitamin therapeutic  (THERAGRAN) tablet [MULTIVITAMIN THERAPEUTIC (THERAGRAN) TABLET] Take 1 tablet by mouth daily.       NON FORMULARY daily Vitamins - Melaleuca       tamsulosin (FLOMAX) 0.4 MG capsule Take 1 capsule (0.4 mg) by mouth daily 30 capsule 1     No Known Allergies    Social History     Socioeconomic History     Marital status:      Spouse name: Not on file     Number of children: Not on file     Years of education: Not on file     Highest education level: Not on file   Occupational History     Not on file   Tobacco Use     Smoking status: Never Smoker     Smokeless tobacco: Never Used   Substance and Sexual Activity     Alcohol use: Yes     Comment: Alcoholic Drinks/day: social only     Drug use: No     Sexual activity: Not on file   Other Topics Concern     Not on file   Social History Narrative     Not on file     Social Determinants of Health     Financial Resource Strain: Not on file   Food Insecurity: Not on file   Transportation Needs: Not on file   Physical Activity: Not on file   Stress: Not on file   Social Connections: Not on file   Intimate Partner Violence: Not on file   Housing Stability: Not on file       Family History   Problem Relation Age of Onset     Cancer Mother         skin     Pacemaker Mother         4 open heart surgeries      Diverticulitis Father      Breast Cancer Other 40.00        great aunt     Urolithiasis Brother         recurrent     Gout No family hx of        Objective:     Appears AAO x 3  No vitals obtained due to virtual visit    Labs   Most Recent 3 CBC's:  Recent Labs   Lab Test 07/12/22  0326 06/29/22  0559 06/21/22  1407   WBC 9.6 5.3 5.8   HGB 12.5 14.0 14.2   MCV 89 93 93    222 227     Most Recent 3 BMP's:  Recent Labs   Lab Test 07/12/22  0326 06/29/22  0559 06/21/22  1407    137 139   POTASSIUM 4.0 4.0 3.8   CHLORIDE 109* 108 105   CO2 23 26 24   BUN 9 10 12   CR 0.75 0.94 0.84   ANIONGAP 9 3 10   INDIA 9.3 8.9 9.3    110* 100     Most Recent  Urinalysis:  Recent Labs   Lab Test 10/10/19  1515 07/11/19  1459 07/04/19  0420   COLOR Yellow   < > Yellow   APPEARANCE Clear   < > Clear   URINEGLC Negative   < > Negative   URINEBILI Negative   < > Negative   URINEKETONE Negative   < > Negative   SG 1.020   < > 1.030   UBLD Trace*   < > Large*   URINEPH 6.0   < > 6.0   PROTEIN Negative   < > 30 mg/dL*   UROBILINOGEN 0.2 E.U./dL   < > <2.0 E.U./dL   NITRITE Negative   < > Negative   LEUKEST Negative   < > Negative   RBCU  --   --  >100*   WBCU  --   --  0-5    < > = values in this interval not displayed.

## 2022-09-15 ENCOUNTER — TELEPHONE (OUTPATIENT)
Dept: UROLOGY | Facility: CLINIC | Age: 57
End: 2022-09-15

## 2022-09-25 ENCOUNTER — HEALTH MAINTENANCE LETTER (OUTPATIENT)
Age: 57
End: 2022-09-25

## 2022-09-26 ENCOUNTER — MYC MEDICAL ADVICE (OUTPATIENT)
Dept: FAMILY MEDICINE | Facility: CLINIC | Age: 57
End: 2022-09-26

## 2022-09-26 NOTE — TELEPHONE ENCOUNTER
See MyChart from Patient needing PCP review.      Please advise     From 7/02/2022 ED with Diverticulitis Dx    Ciprofloxacin HCl 500 mg Oral 2 TIMES DAILY    metroNIDAZOLE 500 mg Oral 2 TIMES DAILY      MELINA Mejía  [facility]

## 2022-09-26 NOTE — TELEPHONE ENCOUNTER
I haven't evaluated pt since 2019; would advise a visit for that concern danae if recurring so soon. May need imaging repeated- urgent care or ER would be reasonable depending how significant sx are vs seeing an available provider or walk in clinic to evaluate (espt with kidney stone issues also going on...)    Would also encourage to schedule for annual physical down the road

## 2022-09-28 ENCOUNTER — OFFICE VISIT (OUTPATIENT)
Dept: FAMILY MEDICINE | Facility: CLINIC | Age: 57
End: 2022-09-28
Payer: COMMERCIAL

## 2022-09-28 VITALS
DIASTOLIC BLOOD PRESSURE: 72 MMHG | HEART RATE: 80 BPM | WEIGHT: 218.1 LBS | BODY MASS INDEX: 36.29 KG/M2 | SYSTOLIC BLOOD PRESSURE: 118 MMHG

## 2022-09-28 DIAGNOSIS — K58.0 IRRITABLE BOWEL SYNDROME WITH DIARRHEA: ICD-10-CM

## 2022-09-28 DIAGNOSIS — K57.32 DIVERTICULITIS OF COLON: Primary | ICD-10-CM

## 2022-09-28 DIAGNOSIS — I48.0 PAROXYSMAL ATRIAL FIBRILLATION (H): ICD-10-CM

## 2022-09-28 DIAGNOSIS — R82.994 HYPERCALCIURIA: ICD-10-CM

## 2022-09-28 DIAGNOSIS — K57.30 DIVERTICULAR DISEASE OF COLON: ICD-10-CM

## 2022-09-28 DIAGNOSIS — N20.0 KIDNEY STONE: ICD-10-CM

## 2022-09-28 PROBLEM — I48.3 TYPICAL ATRIAL FLUTTER (H): Status: RESOLVED | Noted: 2021-10-19 | Resolved: 2022-09-28

## 2022-09-28 PROBLEM — F41.1 GENERALIZED ANXIETY DISORDER: Status: ACTIVE | Noted: 2022-09-28

## 2022-09-28 PROBLEM — Z30.431 SURVEILLANCE OF PREVIOUSLY PRESCRIBED INTRAUTERINE CONTRACEPTIVE DEVICE: Status: ACTIVE | Noted: 2022-09-28

## 2022-09-28 PROCEDURE — 99214 OFFICE O/P EST MOD 30 MIN: CPT | Performed by: FAMILY MEDICINE

## 2022-09-28 NOTE — PROGRESS NOTES
"  Assessment & Plan     Patient presents with:  GI Problem: Diverticulitis - patient believes sx started Saturday 9/24/22 - Hx of this before but usually associated with her Afib / Sx are almost gone today, goes away with liquid diet - patient also had IBSD & gall stones - looking for a dietician        Tania was seen today for gi problem.    Diagnoses and all orders for this visit:    Diverticulitis of colon  Comments:  started new episode 9/24- cleared on liquid diet and fiber supplement.  As currently patient does not have any symptoms we will wait on any antibiotic treatment    Diverticular disease of colon  Comments:  taking OTC fiber supplement called fiber wise.    Irritable bowel syndrome with diarrhea  Comments:  Continue fiberwise melaleuka supplement to have increase fiber in the diet and prevent future episode of diverticulitis as well as IBS with diarrhea  Orders:  -     Nutrition Referral; Future    Paroxysmal atrial fibrillation (H)  Comments:  had ablation done , plan to be in eliquis for total 3 months.  Currently very stable    Hypercalciuria  Patient does have appointment with a urologist and doing 24-hour urine.  I did place a referral for dietitian to modify the diet to prevent future episode of diverticulitis as well as kidney stone  -     Nutrition Referral; Future    Kidney stone  -     Nutrition Referral; Future            BMI:   Estimated body mass index is 36.29 kg/m  as calculated from the following:    Height as of 8/10/22: 1.651 m (5' 5\").    Weight as of this encounter: 98.9 kg (218 lb 1.6 oz).   Weight management plan: Discussed healthy diet and exercise guidelines        No follow-ups on file.      Subjective   Tania Kaufman 56 year old who presents for the following health issues     HPI   Answers for HPI/ROS submitted by the patient on 9/27/2022  What is the reason for your visit today? : Diverticulitis  How many servings of fruits and vegetables do you eat daily?: 2-3  On " average, how many sweetened beverages do you drink each day (Examples: soda, juice, sweet tea, etc.  Do NOT count diet or artificially sweetened beverages)?: 0  How many minutes a day do you exercise enough to make your heart beat faster?: 30 to 60  How many days a week do you exercise enough to make your heart beat faster?: 5  How many days per week do you miss taking your medication?: 0  Patient with history of diverticulitis, renal stone, IBS with diarrhea, with flare of diverticulitis symptoms on 9/24/2022 which self heal itself by liquid diet and increase fiber intake.  She currently taking commercial fiber supplement called fiber wise which seems to be helping most of her symptoms quite well.  I did review her previous history for renal stone as well as diverticulitis.  Last treatment for diverticulitis for July she did had a follow-up CT scan as mentioned below.    She feel her atrial fibrillation is very stable since ablation/cardio version she does have appointment coming up with her cardiologist and most likely they will stop all her medication.    IMPRESSION: AUG 2022  1.  4 mm nonobstructing stone in the mid right kidney. No ureteric stone or hydronephrosis.     2.  Resolution of the acute diverticulitis in the sigmoid colon. Multiple colonic diverticula.        Patient Active Problem List   Diagnosis     Paroxysmal atrial fibrillation (H)     PONV (postoperative nausea and vomiting)     S/P ablation of atrial fibrillation     Vascular disorder of skin     Surveillance of previously prescribed intrauterine contraceptive device     Irritable bowel syndrome with diarrhea     Hypercalciuria     Generalized anxiety disorder        Current Outpatient Medications   Medication     apixaban ANTICOAGULANT (ELIQUIS) 5 MG tablet     co-enzyme Q-10 30 mg capsule     diltiazem ER COATED BEADS (CARDIZEM CD/CARTIA XT) 120 MG 24 hr capsule     flecainide (TAMBOCOR) 150 MG tablet     GLUCOSAMINE SULFATE (GLUCOSAMINE  ORAL)     L.ACID/L.CASEI/B.BIF/B.LUKE/FOS (PROBIOTIC BLEND ORAL)     melatonin 3 mg Tab tablet     multivitamin therapeutic (THERAGRAN) tablet     NON FORMULARY     ondansetron (ZOFRAN ODT) 4 MG ODT tab     tamsulosin (FLOMAX) 0.4 MG capsule     No current facility-administered medications for this visit.          Social Determinants of Health     Tobacco Use: Low Risk      Smoking Tobacco Use: Never Smoker     Smokeless Tobacco Use: Never Used   Alcohol Use: Not on file   Financial Resource Strain: Not on file   Food Insecurity: Not on file   Transportation Needs: Not on file   Physical Activity: Not on file   Stress: Not on file   Social Connections: Not on file   Intimate Partner Violence: Not on file   Depression: Not at risk     PHQ-2 Score: 0   Housing Stability: Not on file        Review of Systems   Constitutional, HEENT, cardiovascular, pulmonary, GI, , musculoskeletal, neuro, skin, endocrine and psych systems are negative, except as otherwise noted.      Objective    /72 (BP Location: Left arm, Patient Position: Sitting, Cuff Size: Adult Large)   Pulse 80   Wt 98.9 kg (218 lb 1.6 oz)   LMP 10/31/2018 (Approximate)   Breastfeeding No   BMI 36.29 kg/m     LMP 06/01/2011   There is no height or weight on file to calculate BMI.  Physical Exam   GENERAL: healthy, alert and no distress  NECK: no adenopathy, no asymmetry, masses, or scars and thyroid normal to palpation  RESP: lungs clear to auscultation - no rales, rhonchi or wheezes  CV: regular rate and rhythm, normal S1 S2, no S3 or S4, no murmur, click or rub, no peripheral edema and peripheral pulses strong  ABDOMEN: soft, nontender, no hepatosplenomegaly, no masses and bowel sounds normal    Rosana Thomas MD   [facility].  [phone]

## 2022-09-30 ENCOUNTER — OFFICE VISIT (OUTPATIENT)
Dept: CARDIOLOGY | Facility: CLINIC | Age: 57
End: 2022-09-30
Payer: COMMERCIAL

## 2022-09-30 VITALS
DIASTOLIC BLOOD PRESSURE: 66 MMHG | HEART RATE: 84 BPM | BODY MASS INDEX: 36.11 KG/M2 | SYSTOLIC BLOOD PRESSURE: 100 MMHG | RESPIRATION RATE: 14 BRPM | WEIGHT: 217 LBS

## 2022-09-30 DIAGNOSIS — I48.0 PAROXYSMAL ATRIAL FIBRILLATION (H): Primary | ICD-10-CM

## 2022-09-30 DIAGNOSIS — Z86.79 S/P ABLATION OF ATRIAL FIBRILLATION: ICD-10-CM

## 2022-09-30 DIAGNOSIS — I48.3 TYPICAL ATRIAL FLUTTER (H): ICD-10-CM

## 2022-09-30 DIAGNOSIS — Z98.890 S/P ABLATION OF ATRIAL FIBRILLATION: ICD-10-CM

## 2022-09-30 PROCEDURE — 99213 OFFICE O/P EST LOW 20 MIN: CPT | Performed by: NURSE PRACTITIONER

## 2022-09-30 NOTE — PATIENT INSTRUCTIONS
Tania Kaufman,    It was a pleasure to see you today at the [facility].     My recommendations after this visit include:    Stop taking Eliqus, flecainide, and diltiazem    Start aspirin 81 mg daily    Take flecainide and diltiazem at onset of A fib.  May repeat flecainide in 4 hours if needed.  If A fib lasts longer than 12 hours, restart Eliquis and call.    Follow up June 2023    Florecita Cox CNP  [facility], Electrophysiology  [phone]  EP nurses [phone]

## 2022-09-30 NOTE — PROGRESS NOTES
HEART CARE ELECTROPHYSIOLOGY NOTE      [facility]  [phone]      Assessment/Recommendations   Assessment/Plan:  Paroxysmal Atrial Fibrillation and typical appearing flutter: No symptomatology or evidence of AF recurrence.  Discontinue flecainide and diltiazem.  May take flecainide and diltiazem at onset of AF and repeat flecainide in 4 hours if needed.  She is to call if episodes are frequent or prolonged.    She has a [id number] score of 1 for female gender.  HAS-BLED score of 0.   Discontinue Eliquis.  Start aspirin 81 mg daily for stroke prophylaxis.    Follow-up 1 year post PVI     History of Present Illness/Subjective    HPI: Tania Kaufman is a 56 year old female who comes in for EP follow up of atrial fibrillation and history and physical prior to pulmonary vein isolation ablation.  She has a history of atrial fibrillation, anxiety, diverticulitis, IBS, and recurrent kidney stones.      She was diagnosed with atrial fibrillation in February 2020, though began having palpitations in the summer 2019.  Symptoms consist of tachypalpitations, dyspnea on exertion, and lightheadedness.  Episodes were occurring every 1 to 2 months, but increased in frequency and duration, occurring almost daily and lasting up to 6 hours.   Echo shows normal structure and function.  Event monitor documented atrial fibrillation with rapid ventricular response.   She was started on low-dose metoprolol succinate and Eliquis on 2/21/2020 with subsequent decrease in AF frequency and symptom severity, but with hypotension.  Metoprolol was switched to diltiazem.   She again had an increase in AF frequency.  Nuclear stress test was negative for ischemia.  She was started on low-dose flecainide, but continued to have frequent episodes of A. fib, so flecainide was ultimately increased to 150 mg twice daily.  She underwent pulmonary vein isolation ablation with Dr. Moreau on 6/29/2022 with RF WACA pulmonary vein  isolation and right atrial CTI flutter line.  She had recurrent AF on 7/11/2022.  She was planned for cardioversion, but converted back to sinus rhythm spontaneously on 7/14/2022.  During this time, she was also treated in the ED for diverticulitis.  She was on low-dose daily aspirin for stroke prophylaxis, but switched to Eliquis on 6/8/2022 in preparation for ablation.     Eden states that she is feeling well, but has had recurrence of diverticulitis, this time not as severe.  She is also doing a 24-hour urine collection for kidney stones and will be undergoing colonoscopy in the near future.  She has not had any further AF.  She denies chest discomfort, palpitations, abdominal fullness/bloating or peripheral edema, shortness of breath, paroxysmal nocturnal dyspnea, orthopnea, lightheadedness, pre-syncope, or syncope.       Cardiographics (EKG personally reviewed):  EKG done 6/21/2022 shows sinus rhythm at 68 bpm, first-degree AV block,  ms, QT/QTc 436/463 ms  EKG done 10/6/2021 shows typical atrial flutter with variable block 94 bpm     Event monitor worn for 1 week beginning on 2/17/2020 shows paroxysmal atrial fibrillation with rapid ventricular response 105-160 bpm.  Symptoms noted for all episodes of A. fib.  AF burden approximately 6%.  No significant bradycardia or pauses.  No significant ventricular arrhythmia.     ECHO done 2/24/2020:    Left ventricle ejection fraction is normal. The calculated left ventricular ejection fraction is 58%.    The right ventricle is mildly dilated. The systolic function is mildly reduced.    No hemodynamically significant valvular heart abnormalities.    No previous study for comparison.     NM stress test done December 21, 2020:     There is no prior study for comparison.     The nuclear stress test is negative for inducible myocardial ischemia or infarction.     The patient is at a low risk of future cardiac ischemic events.     The left ventricular ejection fraction  at rest is greater than 70%.     The patient's exercise capacity is average.  No chest pain with activity.     Left ventricular function is normal.     Stress electrocardiogram was negative for inducible ischemia.    MRA Pulmonary Veins done 4/21/2021: EF 55%    I have reviewed and updated the patient's Past Medical History, Social History, Family History and Medication List.  Outside records personally reviewed.     Physical Examination  Review of Systems   Vitals: /66 (BP Location: Left arm, Patient Position: Sitting, Cuff Size: Adult Large)   Pulse 84   Resp 14   Wt 98.4 kg (217 lb)   LMP 10/31/2018 (Approximate)   BMI 36.11 kg/m    BMI= Body mass index is 36.11 kg/m .  Wt Readings from Last 3 Encounters:   09/30/22 98.4 kg (217 lb)   09/28/22 98.9 kg (218 lb 1.6 oz)   08/10/22 98.9 kg (218 lb)       General Appearance:   Alert, well-appearing and in no acute distress.   HEENT: Atraumatic, normocephalic.  No scleral icterus, normal conjunctivae, EOMs intact, PERRL.  Wearing mask.   Chest/Lungs:   Chest symmetric, spine straight.  Respirations unlabored.  Lungs are clear to auscultation.   Cardiovascular:   Regular rate and rhythm.  Normal first and second heart sounds with no murmurs, rubs, or gallops; radial and posterior tibial pulses are intact, No edema.   Abdomen:  Soft, nondistended, bowel sounds present.   Extremities: No cyanosis or clubbing.   Musculoskeletal: Moves all extremities.     Skin: Warm, dry, intact.    Neurologic: Mood and affect are appropriate.  Alert and oriented to person, place, time, and situation.     ROS: 10 point ROS neg other than the symptoms noted above in the HPI.         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      PONV (postoperative nausea and vomiting)      PONV (postoperative nausea and vomiting)      Past Surgical History:   Procedure Laterality Date     EP ABLATION FOCAL AFIB N/A 6/29/2022    Procedure: EP Ablation Atrial Fibrilation;  Surgeon: Seven Moreau MD;  Location:  HEART CARDIAC CATH LAB     NO PAST SURGERIES       OTHER SURGICAL HISTORY      wisdom teeth removal     [place] CYSTO/URETERO W/LITHOTRIPSY &INDWELL STENT INSRT Right 6/11/2019    Procedure: CYSTOSCOPY, RIGHT RETROGRADE,  RIGHT URETEROSCOPY WITH LASER LITHOTRIPSY AND STENT INSERTION;  Surgeon: John Gutierrez MD;  Location: [facility];  Service: Urology     Family History   Problem Relation Age of Onset     Cancer Mother         skin     Pacemaker Mother         4 open heart surgeries      Diverticulitis Father      Breast Cancer Other 40.00        great aunt     Urolithiasis Brother         recurrent     Gout No family hx of         Social History     Socioeconomic History     Marital status:      Spouse name: Not on file     Number of children: Not on file     Years of education: Not on file     Highest education level: Not on file   Occupational History     Not on file   Tobacco Use     Smoking status: Never Smoker     Smokeless tobacco: Never Used   Substance and Sexual Activity     Alcohol use: Yes     Comment: Alcoholic Drinks/day: social only     Drug use: No     Sexual activity: Not on file   Other Topics Concern     Not on file   Social History Narrative     Not on file     Social Determinants of Health     Financial Resource Strain: Not on file   Food Insecurity: Not on file   Transportation Needs: Not on file   Physical Activity: Not  on file   Stress: Not on file   Social Connections: Not on file   Intimate Partner Violence: Not on file   Housing Stability: Not on file           Medications  Allergies   Current Outpatient Medications   Medication Sig Dispense Refill     aspirin (ASA) 81 MG EC tablet Take 1 tablet (81 mg) by mouth daily       GLUCOSAMINE SULFATE (GLUCOSAMINE ORAL) Take 1 tablet by mouth daily        L.ACID/L.CASEI/B.BIF/B.LUKE/FOS (PROBIOTIC BLEND ORAL) Take 1 tablet by mouth 2 times daily as needed        melatonin 3 mg Tab tablet [MELATONIN 3 MG TAB TABLET] Take 3 mg by mouth bedtime as needed.       multivitamin therapeutic (THERAGRAN) tablet [MULTIVITAMIN THERAPEUTIC (THERAGRAN) TABLET] Take 1 tablet by mouth daily.       NON FORMULARY daily Vitamins - Melaleuca       ondansetron (ZOFRAN ODT) 4 MG ODT tab Take 1 tablet (4 mg) by mouth every 8 hours as needed 15 tablet 1     tamsulosin (FLOMAX) 0.4 MG capsule Take 1 capsule (0.4 mg) by mouth daily (Patient taking differently: Take 0.4 mg by mouth as needed) 30 capsule 1     No Known Allergies     Severe nausea and vomiting with anesthesia      Lab Results    Chemistry/lipid CBC Cardiac Enzymes/BNP/TSH/INR   Recent Labs   Lab Test 09/11/14  0821   CHOL 178   HDL 75   LDL 87   TRIG 82     Recent Labs   Lab Test 09/11/14  0821   LDL 87     Recent Labs   Lab Test 07/12/22  0326 06/29/22  0559    137   POTASSIUM 4.0 4.0   CHLORIDE 109* 108   CO2 23 26   ANIONGAP 9 3    110*   BUN 9 10   CR 0.75 0.94   INDIA 9.3 8.9      CBC RESULTS: Recent Labs   Lab Test 07/12/22  0326   WBC 9.6   RBC 4.30   HGB 12.5   HCT 38.4   MCV 89   MCH 29.1   MCHC 32.6   RDW 13.1         No results for input(s): TROPONINI in the last 07967 hours.  No results for input(s): BNP, NTBNPI, NTBNP in the last 24934 hours.  Lab Results   Component Value Date    TSH 5.94 07/12/2022

## 2022-09-30 NOTE — LETTER
9/30/2022    Lashonda Arango MD  [address] [facility] [address]    RE: Tania Kuafman       Dear Colleague,     I had the pleasure of seeing Tania Kaufman in the [facility].    HEART CARE ELECTROPHYSIOLOGY NOTE      [facility]  [phone]      Assessment/Recommendations   Assessment/Plan:  Paroxysmal Atrial Fibrillation and typical appearing flutter: No symptomatology or evidence of AF recurrence.  Discontinue flecainide and diltiazem.  May take flecainide and diltiazem at onset of AF and repeat flecainide in 4 hours if needed.  She is to call if episodes are frequent or prolonged.    She has a [id number] score of 1 for female gender.  HAS-BLED score of 0.   Discontinue Eliquis.  Start aspirin 81 mg daily for stroke prophylaxis.    Follow-up 1 year post PVI     History of Present Illness/Subjective    HPI: Tania Kaufman is a 56 year old female who comes in for EP follow up of atrial fibrillation and history and physical prior to pulmonary vein isolation ablation.  She has a history of atrial fibrillation, anxiety, diverticulitis, IBS, and recurrent kidney stones.      She was diagnosed with atrial fibrillation in February 2020, though began having palpitations in the summer 2019.  Symptoms consist of tachypalpitations, dyspnea on exertion, and lightheadedness.  Episodes were occurring every 1 to 2 months, but increased in frequency and duration, occurring almost daily and lasting up to 6 hours.   Echo shows normal structure and function.  Event monitor documented atrial fibrillation with rapid ventricular response.   She was started on low-dose metoprolol succinate and Eliquis on 2/21/2020 with subsequent decrease in AF frequency and symptom severity, but with hypotension.  Metoprolol was switched to diltiazem.   She again had an increase in AF frequency.  Nuclear stress test was negative for ischemia.  She was started on low-dose flecainide, but  continued to have frequent episodes of A. fib, so flecainide was ultimately increased to 150 mg twice daily.  She underwent pulmonary vein isolation ablation with Dr. Moreau on 6/29/2022 with RF WACA pulmonary vein isolation and right atrial CTI flutter line.  She had recurrent AF on 7/11/2022.  She was planned for cardioversion, but converted back to sinus rhythm spontaneously on 7/14/2022.  During this time, she was also treated in the ED for diverticulitis.  She was on low-dose daily aspirin for stroke prophylaxis, but switched to Eliquis on 6/8/2022 in preparation for ablation.     Eden states that she is feeling well, but has had recurrence of diverticulitis, this time not as severe.  She is also doing a 24-hour urine collection for kidney stones and will be undergoing colonoscopy in the near future.  She has not had any further AF.  She denies chest discomfort, palpitations, abdominal fullness/bloating or peripheral edema, shortness of breath, paroxysmal nocturnal dyspnea, orthopnea, lightheadedness, pre-syncope, or syncope.       Cardiographics (EKG personally reviewed):  EKG done 6/21/2022 shows sinus rhythm at 68 bpm, first-degree AV block,  ms, QT/QTc 436/463 ms  EKG done 10/6/2021 shows typical atrial flutter with variable block 94 bpm     Event monitor worn for 1 week beginning on 2/17/2020 shows paroxysmal atrial fibrillation with rapid ventricular response 105-160 bpm.  Symptoms noted for all episodes of A. fib.  AF burden approximately 6%.  No significant bradycardia or pauses.  No significant ventricular arrhythmia.     ECHO done 2/24/2020:    Left ventricle ejection fraction is normal. The calculated left ventricular ejection fraction is 58%.    The right ventricle is mildly dilated. The systolic function is mildly reduced.    No hemodynamically significant valvular heart abnormalities.    No previous study for comparison.     NM stress test done December 21, 2020:     There is no prior study  for comparison.     The nuclear stress test is negative for inducible myocardial ischemia or infarction.     The patient is at a low risk of future cardiac ischemic events.     The left ventricular ejection fraction at rest is greater than 70%.     The patient's exercise capacity is average.  No chest pain with activity.     Left ventricular function is normal.     Stress electrocardiogram was negative for inducible ischemia.    MRA Pulmonary Veins done 4/21/2021: EF 55%    I have reviewed and updated the patient's Past Medical History, Social History, Family History and Medication List.  Outside records personally reviewed.     Physical Examination  Review of Systems   Vitals: /66 (BP Location: Left arm, Patient Position: Sitting, Cuff Size: Adult Large)   Pulse 84   Resp 14   Wt 98.4 kg (217 lb)   LMP 10/31/2018 (Approximate)   BMI 36.11 kg/m    BMI= Body mass index is 36.11 kg/m .  Wt Readings from Last 3 Encounters:   09/30/22 98.4 kg (217 lb)   09/28/22 98.9 kg (218 lb 1.6 oz)   08/10/22 98.9 kg (218 lb)       General Appearance:   Alert, well-appearing and in no acute distress.   HEENT: Atraumatic, normocephalic.  No scleral icterus, normal conjunctivae, EOMs intact, PERRL.  Wearing mask.   Chest/Lungs:   Chest symmetric, spine straight.  Respirations unlabored.  Lungs are clear to auscultation.   Cardiovascular:   Regular rate and rhythm.  Normal first and second heart sounds with no murmurs, rubs, or gallops; radial and posterior tibial pulses are intact, No edema.   Abdomen:  Soft, nondistended, bowel sounds present.   Extremities: No cyanosis or clubbing.   Musculoskeletal: Moves all extremities.     Skin: Warm, dry, intact.    Neurologic: Mood and affect are appropriate.  Alert and oriented to person, place, time, and situation.     ROS: 10 point ROS neg other than the symptoms noted above in the HPI.         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[place]      PONV (postoperative nausea and vomiting)      PONV (postoperative nausea and vomiting)      Past Surgical History:   Procedure Laterality Date     EP ABLATION FOCAL AFIB N/A 6/29/2022    Procedure: EP Ablation Atrial Fibrilation;  Surgeon: Seven Moreau MD;  Location: [facility] CARDIAC CATH LAB     NO PAST SURGERIES       OTHER SURGICAL HISTORY      wisdom teeth removal     [place] CYSTO/URETERO W/LITHOTRIPSY &INDWELL STENT INSRT Right 6/11/2019    Procedure: CYSTOSCOPY, RIGHT RETROGRADE,  RIGHT URETEROSCOPY WITH LASER LITHOTRIPSY AND STENT INSERTION;  Surgeon: John Gutierrez MD;  Location: [facility];  Service: Urology     Family History   Problem Relation Age of Onset     Cancer Mother         skin     Pacemaker Mother         4 open heart surgeries      Diverticulitis Father      Breast Cancer Other 40.00        great aunt     Urolithiasis Brother         recurrent     Gout No family hx of         Social History     Socioeconomic History     Marital status:      Spouse name: Not on file     Number of children: Not on file     Years of education: Not on file     Highest education level: Not on file   Occupational History     Not on file   Tobacco Use     Smoking status: Never Smoker     Smokeless tobacco: Never Used   Substance and Sexual Activity     Alcohol use: Yes     Comment: Alcoholic Drinks/day: social only     Drug use: No     Sexual activity: Not on file   Other Topics Concern      Not on file   Social History Narrative     Not on file     Social Determinants of Health     Financial Resource Strain: Not on file   Food Insecurity: Not on file   Transportation Needs: Not on file   Physical Activity: Not on file   Stress: Not on file   Social Connections: Not on file   Intimate Partner Violence: Not on file   Housing Stability: Not on file           Medications  Allergies   Current Outpatient Medications   Medication Sig Dispense Refill     aspirin (ASA) 81 MG EC tablet Take 1 tablet (81 mg) by mouth daily       GLUCOSAMINE SULFATE (GLUCOSAMINE ORAL) Take 1 tablet by mouth daily        L.ACID/L.CASEI/B.BIF/B.LUKE/FOS (PROBIOTIC BLEND ORAL) Take 1 tablet by mouth 2 times daily as needed        melatonin 3 mg Tab tablet [MELATONIN 3 MG TAB TABLET] Take 3 mg by mouth bedtime as needed.       multivitamin therapeutic (THERAGRAN) tablet [MULTIVITAMIN THERAPEUTIC (THERAGRAN) TABLET] Take 1 tablet by mouth daily.       NON FORMULARY daily Vitamins - Melaleuca       ondansetron (ZOFRAN ODT) 4 MG ODT tab Take 1 tablet (4 mg) by mouth every 8 hours as needed 15 tablet 1     tamsulosin (FLOMAX) 0.4 MG capsule Take 1 capsule (0.4 mg) by mouth daily (Patient taking differently: Take 0.4 mg by mouth as needed) 30 capsule 1     No Known Allergies     Severe nausea and vomiting with anesthesia      Lab Results    Chemistry/lipid CBC Cardiac Enzymes/BNP/TSH/INR   Recent Labs   Lab Test 09/11/14  0821   CHOL 178   HDL 75   LDL 87   TRIG 82     Recent Labs   Lab Test 09/11/14  0821   LDL 87     Recent Labs   Lab Test 07/12/22  0326 06/29/22  0559    137   POTASSIUM 4.0 4.0   CHLORIDE 109* 108   CO2 23 26   ANIONGAP 9 3    110*   BUN 9 10   CR 0.75 0.94   INDIA 9.3 8.9      CBC RESULTS: Recent Labs   Lab Test 07/12/22  0326   WBC 9.6   RBC 4.30   HGB 12.5   HCT 38.4   MCV 89   MCH 29.1   MCHC 32.6   RDW 13.1         No results for input(s): TROPONINI in the last 56561 hours.  No results for  input(s): BNP, NTBNPI, NTBNP in the last 25441 hours.  Lab Results   Component Value Date    TSH 5.94 07/12/2022                  Thank you for allowing me to participate in the care of your patient.      Sincerely,   TAMIA Presley CNP   [facility]  cc:   TAMIA Presley CNP  [address], [address]  [place] [address]

## 2022-10-03 ENCOUNTER — TRANSFERRED RECORDS (OUTPATIENT)
Dept: HEALTH INFORMATION MANAGEMENT | Facility: CLINIC | Age: 57
End: 2022-10-03

## 2022-10-11 NOTE — INTERVAL H&P NOTE
"I have reviewed the surgical (or preoperative) H&P that is linked to this encounter, and examined the patient. There are no significant changes    Clinical Conditions Present on Arrival:  Clinically Significant Risk Factors Present on Admission               # Coagulation Defect: home medication list includes an anticoagulant medication   # Obesity: Estimated body mass index is 36.69 kg/m  as calculated from the following:    Height as of this encounter: 1.651 m (5' 5\").    Weight as of this encounter: 100 kg (220 lb 8 oz).       " all other ROS negative except as per HPI

## 2022-10-14 ENCOUNTER — TELEPHONE (OUTPATIENT)
Dept: UROLOGY | Facility: CLINIC | Age: 57
End: 2022-10-14

## 2022-10-14 ENCOUNTER — VIRTUAL VISIT (OUTPATIENT)
Dept: UROLOGY | Facility: CLINIC | Age: 57
End: 2022-10-14
Payer: COMMERCIAL

## 2022-10-14 DIAGNOSIS — R82.998 HIGH URINE SODIUM: ICD-10-CM

## 2022-10-14 DIAGNOSIS — R82.994 HYPERCALCIURIA: Primary | ICD-10-CM

## 2022-10-14 DIAGNOSIS — N20.0 CALCULUS OF KIDNEY: ICD-10-CM

## 2022-10-14 PROCEDURE — 99213 OFFICE O/P EST LOW 20 MIN: CPT | Mod: 95 | Performed by: PHYSICIAN ASSISTANT

## 2022-10-14 RX ORDER — CHLORTHALIDONE 25 MG/1
25 TABLET ORAL DAILY
Qty: 30 TABLET | Refills: 1 | Status: SHIPPED | OUTPATIENT
Start: 2022-10-14 | End: 2022-12-13

## 2022-10-14 ASSESSMENT — PAIN SCALES - GENERAL: PAINLEVEL: NO PAIN (0)

## 2022-10-14 NOTE — PROGRESS NOTES
Assessment/Plan:    Assessment & Plan   Tania was seen today for prevention.    Diagnoses and all orders for this visit:    Hypercalciuria  -     chlorthalidone (HYGROTON) 25 MG tablet; Take 1 tablet (25 mg) by mouth daily for 30 days  -     Vitamin D deficiency screening; Future  -     Parathyroid Hormone Intact; Future  -     Ionized Calcium; Future  -     Patient Stated Goal: Prevent further stones    High urine sodium    Calculus of kidney    Stone Management Plan  Stone Management 10/10/2019 8/31/2022 10/14/2022   Urinary Tract Infection No suspicion of infection No suspicion of infection No suspicion of infection   Renal Colic Asymptomatic at this time Asymptomatic at this time Asymptomatic at this time   Renal Failure No suspicion of renal failure No suspicion of renal failure No suspicion of renal failure   Current CT date - 8/30/2022 -   Right sided stones? - Yes -   R Number of ureteral stones - No ureteral stones -   R Number of kidney stones  - 1 -   R GSD of kidney stones - 4 - 10 -   R Hydronephrosis - None -   R Stone Event No current event No current event No current event   R Current Plan Observe Observe Observe   Observe rationale Limited stone burden with good prognosis for spontaneous passage Limited stone burden with good prognosis for spontaneous passage Limited stone burden with good prognosis for spontaneous passage   Left sided stones? - No -   L Stone Event No current event No current event No current event   L Current Plan Observe - -   Observe rationale Limited stone burden with good prognosis for spontaneous passage - -         PLAN    55 yo F with history of long standing, recurrent calcium based kidney stones, previous surgical stone interventions. Updated risk factors of hypercalciuria and high urine sodium. Nonbostructing right renal stone, grown since 2019.    Based on review of lab results with the patient, she will maintain increased fluid consumption to generate at least 2,000  ml urine daily and initiate restricted daily sodium intake to less than 2,000 mg/day. Will check serum PTH, ionized calcium and vitamin D. Will initiate chlorthalidone 25 mg daily and return with updated 24 hour urine collection and serum potassium.    Telephone call duration:11 minutes  20 minutes spent on the date of the encounter doing chart review, history and exam, documentation and further activities per the note    Sigrid Alvarez PA-C  [facility]    HPI  Ms. Tania Kaufman is a 56 year old  female who is being evaluated via a billable video visit by [facility] [place] for ongoing management of stone risk reduction.     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 She has previously participated in stone risk evaluation with identified factors but is no longer adherent to recommendations. She has identified modifiable stone risks including:  low urine volume, unclassified hypercalciuria and high urine sodium. She has identified non-modifiable stone risks including:  bilateral stones.     She is asymptomatic at present. She denies symptoms of fever, chills, flank pain, nausea, vomiting, urinary frequency and dysuria. She is going to be meeting with a nutritionist to help navigate dietary restrictions.    Objectives for this encounter include evaluating repeat 24 hour urine collection to clarify prior discrepancies.    One 24 hour urine collection demonstrates moderate hypercalciuria (350 mg) and moderate high urine sodium (177 mmol).     ROS   Review of systems is negative except for HPI.    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[place] and [place]      PONV (postoperative nausea and vomiting)      PONV (postoperative nausea and vomiting)        Past Surgical History:   Procedure Laterality Date     EP ABLATION FOCAL AFIB N/A 6/29/2022    Procedure: EP Ablation Atrial Fibrilation;  Surgeon: Seven Moreau MD;  Location:  [facility]     NO PAST SURGERIES       OTHER SURGICAL HISTORY      wisdom teeth removal     IL CYSTO/URETERO W/LITHOTRIPSY &INDWELL STENT INSRT Right 6/11/2019    Procedure: CYSTOSCOPY, RIGHT RETROGRADE,  RIGHT URETEROSCOPY WITH LASER LITHOTRIPSY AND STENT INSERTION;  Surgeon: John Gutierrez MD;  Location: [facility];  Service: Urology       Current Outpatient Medications   Medication Sig Dispense Refill     aspirin (ASA) 81 MG EC tablet Take 1 tablet (81 mg) by mouth daily       chlorthalidone (HYGROTON) 25 MG tablet Take 1 tablet (25 mg) by mouth daily for 30 days 30 tablet 1     GLUCOSAMINE SULFATE (GLUCOSAMINE ORAL) Take 1 tablet by mouth daily        L.ACID/L.CASEI/B.BIF/B.LUKE/FOS (PROBIOTIC BLEND ORAL) Take 1 tablet by mouth 2 times daily as needed        melatonin 3 mg Tab tablet [MELATONIN 3 MG TAB TABLET] Take 3 mg by mouth bedtime as needed.       multivitamin therapeutic (THERAGRAN) tablet [MULTIVITAMIN THERAPEUTIC (THERAGRAN) TABLET] Take 1 tablet by mouth daily.       NON FORMULARY daily Vitamins - Melaleuca       ondansetron (ZOFRAN ODT) 4 MG ODT tab Take 1 tablet (4 mg) by mouth every 8 hours as needed 15 tablet 1     tamsulosin (FLOMAX) 0.4 MG capsule Take 1 capsule (0.4 mg) by mouth daily (Patient taking differently: Take 0.4 mg by mouth as needed) 30 capsule 1       No Known Allergies    Social History     Socioeconomic History     Marital status:       Spouse name: Not on file     Number of children: Not on file     Years of education: Not on file     Highest education level: Not on file   Occupational History     Not on file   Tobacco Use     Smoking status: Never     Smokeless tobacco: Never   Substance and Sexual Activity     Alcohol use: Yes     Comment: Alcoholic Drinks/day: social only     Drug use: No     Sexual activity: Not on file   Other Topics Concern     Not on file   Social History Narrative     Not on file     Social Determinants of Health     Financial Resource Strain: Not on file   Food Insecurity: Not on file   Transportation Needs: Not on file   Physical Activity: Not on file   Stress: Not on file   Social Connections: Not on file   Intimate Partner Violence: Not on file   Housing Stability: Not on file       Family History   Problem Relation Age of Onset     Cancer Mother         skin     Pacemaker Mother         4 open heart surgeries      Diverticulitis Father      Breast Cancer Other 40.00        great aunt     Urolithiasis Brother         recurrent     Gout No family hx of        Objective:     Appears AAO x 3  No vitals obtained due to virtual visit

## 2022-10-14 NOTE — PROGRESS NOTES
Patient is roomed via telephone for a virtual visit.  Patient confirmed she is in the [facility] at the time of this appointment.  Patient understands that this visit is billable and agree to proceed with appointment.

## 2022-10-14 NOTE — PATIENT INSTRUCTIONS
Patient Stated Goal: Prevent further stones  Chlorthalidone    This medication stimulates calcium reabsorption in the kidney, reducing the concentration of calcium in the urine.    USES:  To correct high urinary calcium (hypercalciuria) and prevent calcium stones.    HOW TO USE:  It is extremely important that patients keep close control of their dietary sodium while taking chlorthalidone. If the diet is too high in sodium, chlorthalidone will not be effective in decreasing calcium in the urine. In addition, a high sodium diet while taking chlorthalidone may cause the body to lose large amounts of potassium and stop making citrate.    *This medication can cause a decrease in blood potassium levels and can also reduce citrate levels in the urine. Therefore, potassium citrate is sometimes given with this medication to correct both the low potassium and citrate that can occur with chlorthalidone use.     SIDE EFFECTS:  Dizziness, lightheadedness, blurred vision, loss of appetite, itching, stomach upset, headache, weakness, or other potential side effects may occur as your body adjusts to this medication. If any of these effects persist or worsen, notify your doctor or pharmacist promptly.

## 2022-10-25 ENCOUNTER — LAB (OUTPATIENT)
Dept: LAB | Facility: CLINIC | Age: 57
End: 2022-10-25
Payer: COMMERCIAL

## 2022-10-25 DIAGNOSIS — R82.994 HYPERCALCIURIA: ICD-10-CM

## 2022-10-25 LAB
CALCIUM, IONIZED MEASURED: 1.16 MMOL/L (ref 1.11–1.3)
DEPRECATED CALCIDIOL+CALCIFEROL SERPL-MC: 32 UG/L (ref 20–75)
ION CA PH 7.4: 1.17 MMOL/L (ref 1.11–1.3)
PH: 7.42 (ref 7.35–7.45)
PTH-INTACT SERPL-MCNC: 79 PG/ML (ref 15–65)

## 2022-10-25 PROCEDURE — 36415 COLL VENOUS BLD VENIPUNCTURE: CPT

## 2022-10-25 PROCEDURE — 83970 ASSAY OF PARATHORMONE: CPT

## 2022-10-25 PROCEDURE — 82306 VITAMIN D 25 HYDROXY: CPT

## 2022-10-25 PROCEDURE — 82330 ASSAY OF CALCIUM: CPT

## 2022-10-30 DIAGNOSIS — N20.0 KIDNEY STONE: ICD-10-CM

## 2022-10-31 RX ORDER — TAMSULOSIN HYDROCHLORIDE 0.4 MG/1
CAPSULE ORAL
Qty: 30 CAPSULE | Refills: 1 | Status: SHIPPED | OUTPATIENT
Start: 2022-10-31 | End: 2022-11-23

## 2022-10-31 NOTE — PROGRESS NOTES
"OUTPATIENT CLINICAL NUTRITION SERVICES ASSESSMENT    Telemedicine Visit: The patient's condition can be safely assessed and treated via synchronous audio telemedicine encounter.      Reason for Telemedicine Visit: Services only offered telehealth    Originating Site (Patient Location): Patient's home    Distant Location (provider location): On-site    Consent:  The patient/guardian has verbally consented to: the potential risks and benefits of telemedicine (telephone visit) versus in person care; bill my insurance or make self-payment for services provided; and responsibility for payment of non-covered services.     Mode of Communication: telephone call to pt's phone number listed in chart    As the provider I attest to compliance with applicable laws and regulations related to telemedicine.    Rina Hayes RD, LD    REASON FOR ASSESSMENT  Tania K Kaufman referred by Rosana hTomas MD for MNT related to  K58.0 (ICD-10-CM) - Irritable bowel syndrome with diarrhea  R82.994 (ICD-10-CM) - Hypercalciuria  N20.0 (ICD-10-CM) - Kidney stone    Irritable Bowel  renal stone    Per referral encounter note:  Pt has a hx of diverticulitis, recent symptoms started on 9/24/22. Is relieved when following a liquid diet and fiber supplemented (Fiber Velásquez).    \"Patient with history of diverticulitis, renal stone, IBS with diarrhea, with flare of diverticulitis symptoms on 9/24/2022 which self heal itself by liquid diet and increase fiber intake.  She currently taking commercial fiber supplement called fiber wise which seems to be helping most of her symptoms quite well.  I did review her previous history for renal stone as well as diverticulitis.  Last treatment for diverticulitis for July she did had a follow-up CT scan as mentioned below....    1.  4 mm nonobstructing stone in the mid right kidney. No ureteric stone or hydronephrosis.     2.  Resolution of the acute diverticulitis in the sigmoid colon. Multiple colonic " "diverticula.\"    Staying away from chocolate, raspberries, red meat, sweet potatoes, pop, no tea    Patient accompanied by: N/A    ASSESSMENT   -PMH: a fib, postoperative nausea and vomiting, ablation of a fib, vascular disorder of skin, irritable bowel syndrome with diarrhea, hypercalciuria, diverticulitis    What brings you to our session today? Have you been to see an RD before?  IBS-D, diverticulitis, kidney stones - would like to know how to manage all of these conditions because each type has restrictions. Has never been to seen an RD before.      What are you hoping to learn or work on together?  Healthy food choices, what I can eat - good alternatives to add   Doesn't love to cook but is open to food prep    Nutritional Details:   -Food allergies: none  -Food sensitivities: none - loves milk but it causes bloating and diarrhea, tried Fairlife. Is able to eat other types of dairy.  -GI concerns: stays away from what doesn't work for IBS-D  -Appetite: ebbs and flows, varies but is pretty \"normal\"  -Pace of eating: fast - likes to have hot food so eats it before it gets cold  -Role of cooking: single mom with 2 teens - \"grab and go\"  -Role of food shopping: single mom with 2 teens - \"grab and go\"    Diet Recall:  Breakfast: water with lemon, wellness company brand supplement called \"FiberWise,\" Atkins shake - sugar control, banana  Snack: bowl of cottage cheese with peaches, piece of sour dour bread with lemon juice and avocado  Lunch: chicken breast and rice or vegetables, carrots or something similar. 2:00pm when she is the hungriest, usually biggest meal of the day  Dinner: Shake with no sugar: ex. with [employer] cranberry juice, dark-colored fruits like blackberries, \"light\" (easily digestible?) fruits, kale, FiberWise, gian seeds usually not not anymore d/t oxalates?  Snack: quinoa, chips with hummus  Beverages: N/A  Dining out: N/A    Vegetables pt likes: broccoli, cauliflower, zucchini  Fruit pt likes: " "blackberries, avril, peaches, pears     Eggs instead of chicken sometimes, looking for more protein options  Adds aloe to food, and it helps soothe diverticulitis. Can tell when it's coming on but is not really able to track what foods cause it - feels that it is mostly seeds. Eats mostly a clear liquid diet until it resolves.     FiberWise - 6 grams of fiber for 1 scoop, serving size is 2 scoops    Physical Activity: walk three miles per day, light weight resistance, yoga. Is wanting to get back into exercise because now she is able to - had ablation for a fib.  Days per week: N/A  Duration: N/A  Activity type: N/A  Limitations: none, even when had a fib was still able to try but then heart rate would increase a lot    NUTRITION FOCUSED PHYSICAL ASSESSMENT (NFPA) FOR DIAGNOSING MALNUTRITION  No: unable to assess d/t telephone visit     Observed: unable to assess d/t telephone visit     Obtained from Chart/Interdisciplinary Team: none noted    LABS  Labs reviewed    MEDICATIONS  Medications reviewed - coenzyme Q10?, multivitamin, Melaleuca supplement, ondansetron for calculus of kidney, probiotic, tamsulosin for kidney stone    ANTHROPOMETRICS   Height: 5' 5\" (1.651 m)  Weight: 217 lbs (98.4 kg)  BMI (kg/m2): 36.1  Weight Status: Obesity Grade II BMI 35-39.9  IBW: 125 lbs  ADJ BW: 148 lbs  %IBW: 174  Weight History:  Wt Readings from Last 15 Encounters:   09/30/22 98.4 kg (217 lb)   09/28/22 98.9 kg (218 lb 1.6 oz)   08/10/22 98.9 kg (218 lb)   07/12/22 99.8 kg (220 lb)   06/29/22 100 kg (220 lb 8 oz)   06/21/22 101.6 kg (224 lb)   11/09/21 95.7 kg (211 lb)   10/06/21 95.6 kg (210 lb 12.8 oz)   06/22/21 97.7 kg (215 lb 6.4 oz)   06/22/21 97.7 kg (215 lb 6.4 oz)   10/19/20 98.3 kg (216 lb 12.8 oz)   03/05/20 96.2 kg (212 lb)   02/21/20 97.5 kg (215 lb)   02/10/20 96.6 kg (213 lb)   06/11/19 94.3 kg (208 lb)   Slight weight loss since 6/22 - 3.1% in approx. 4 months (not considered significant for " malnutrition)    What is your UBW? Have you noticed any changes to your weight recently?  150-160 lbs, is around the weight when she had her kids. Was previously at 150-160 and was very active, slowly has gained weight. Would like to get back to that weight, feels starting exercise again will help with this.    Dosing weight: 67.3 kg using ADJ BW    ASSESSED NUTRITION NEEDS  Estimated Energy Needs: 1,346-1,683 kcals/day (20-25 Kcal/Kg)  Justification: (maintenance)  Estimated Protein Needs: 54-67 grams protein/day (0.8-1 g pro/Kg)  Justification: (preservation of lean body mass)  Estimated Fluid Needs: 2,019 mL/day (30 mL/kg)    ASSESSED MALNUTRITION STATUS  % Weight Loss: Weight loss does not meet criteria for malnutrition - 3.1% in approx. 4 months  % Intake: Decreased intake does not meet criteria for malnutrition - unable to adequately assess, pt is making changes to diet d/t restriction from GI issues  Subcutaneous Fat Loss: Unable to assess d/t telephone visit   Loss of Muscle Mass: Unable to assess d/t telephone visit   Fluid Retention: None noted    Malnutrition Diagnosis: Unable to determine due to lack of NFPE related to telephone visit     DIAGNOSIS   Nutrition Diagnosis: Food and nutrition-related knowledge deficit related to GI and kidney conditions as evidenced by pt desire for assistance and current limiting of dietary choices per dietary recall    INTERVENTIONS   Nutrition Prescription: general, healthy eating guidelines following the MyPlate method. Ensuring consistent intake of 5-6 small meals/snacks and focusing on avoiding skipping meals. Increasing fiber intake gradually. Clear liquid diets during flares of diverticulitis and ensuring that intake of fiber is low during inflammatory state. Choosing grains that are refined, white and making sure that labels on products mention less than 2 grams per serving. Cook vegetables and use canned fruits rather than fresh. Consume 6-8 cups of water in  addition to other beverages. Focus on a low-fat diet, avoiding spicy, greasy, fried, caffeine, or mint if have GERD. Slowly add fiber back into diet until consuming 25-35 grams (very high fiber not beneficial). Plenty of WGs, fruits, and vegetables. Incorporation of plenty of lean proteins and plant-based protein sources. Ensure adequate fluid intake to prevent kidney stone formation. Reduce urine calcium via reducing total salt intake, balancing diet - high acidic load on body need sto be reduced. Avoid high acid foods such as seafood, cheese, meat. Decrease acidity of urine by increasing fruit and vegetable intake.     IMPLEMENTATION   Assessed learning needs and learning preference: N/A  Teaching Method(s) used: Booklet / Handout  Explanation    Nutrition Education (Content):              a)  Discussed: current dietary habits and impact on conditions, importance of simplifying and reducing overall restriction = increased food variety, MyPlate method and how pt's current intake compares, focusing on high oxalate foods, increasing water intake, decreasing salt intake. Worked on outline for meal planning. Provided reassurance for current dietary habits and changes pt has already made. Offered MyPlate recipes, possibility of increasing snack/meal variety. Discussed current fiber supplement compared to dietary recommendations. Etc.              b)  Provided the following handouts: Eat Right with MyPlate, Health Snack List, High-Calcium Foods List (2018), 20 Ways to Enjoy More Fruits and Vegetables    Website links:   1. https://www.[employer].org/food/planning-and-prep/smart-shopping/3-strategies-for-opgsrmttrh-gtwr-posjwsay  2. https://www.myplate.gov/myplate-kitchen/recipes    Nutrition Education (Application):              a)  Discussed current eating plans and/or recommended alternative food choices              b)  Patient verbalizes understanding of diet by creating goals that reflect diet recommendations and  offering no further questions or concerns at the end of the meeting.      Anticipate good compliance   Stage of Change: preparation  Additional: some action as well, would like to start meal planning so preparing for that. Has already made many changes, preparing for tweaks to diet to increase overall variety and decrease restriction    GOALS  1. Sundays Meal Prepping using MyPlate - vegetables and more lean meat choices  Proteins: chicken, salmon, turkeys  Vegetables: broccoli, cauliflower, carrots, green beans, vegetables in light ranch, sour cream   Grain: rice - usually white or sweta rice - brown would be good, potatoes, sourdough bread, wheat bread  Dairy: yogurt Chobani Light and Fit greek yogurt   Snacks throughout the day usually, depends    FOLLOW UP/MONITORING   Progress towards goals will be monitored and evaluated per protocol and Practice Guidelines    Time Spent with Patient  Approx. 45 minutes    Rina Hayes RD, LD  Clinical Dietitian  Office: [phone]  Weekend pager: [phone]

## 2022-10-31 NOTE — TELEPHONE ENCOUNTER
"Routing refill request to provider for review/approval because:  Patient reports taking medication differently.  Patient also requested removal    Last Written Prescription Date:  8/26/22  Last Fill Quantity: 30,  # refills: 1   Last office visit provider:  9/28/22     Requested Prescriptions   Pending Prescriptions Disp Refills     tamsulosin (FLOMAX) 0.4 MG capsule [Pharmacy Med Name: TAMSULOSIN 0.4MG CAPSULES] 30 capsule 1     Sig: TAKE 1 CAPSULE(0.4 MG) BY MOUTH DAILY       Alpha Blockers Passed - 10/31/2022  2:11 PM        Passed - Blood pressure under 140/90 in past 12 months     BP Readings from Last 3 Encounters:   09/30/22 100/66   09/28/22 118/72   08/10/22 103/75                 Passed - Recent (12 mo) or future (30 days) visit within the authorizing provider's specialty     Patient has had an office visit with the authorizing provider or a provider within the authorizing providers department within the previous 12 mos or has a future within next 30 days. See \"Patient Info\" tab in inbasket, or \"Choose Columns\" in Meds & Orders section of the refill encounter.              Passed - Patient does not have Tadalafil, Vardenafil, or Sildenafil on their medication list        Passed - Medication is active on med list        Passed - Patient is 18 years of age or older        Passed - No active pregnancy on record        Passed - No positive pregnancy test in past 12 months             Parviz Mcgee RN 10/31/22 2:11 PM  "

## 2022-11-01 ENCOUNTER — HOSPITAL ENCOUNTER (OUTPATIENT)
Dept: NUTRITION | Facility: CLINIC | Age: 57
Discharge: HOME | End: 2022-11-01
Attending: FAMILY MEDICINE | Admitting: FAMILY MEDICINE
Payer: COMMERCIAL

## 2022-11-01 DIAGNOSIS — N20.0 KIDNEY STONE: ICD-10-CM

## 2022-11-01 DIAGNOSIS — R82.994 HYPERCALCIURIA: ICD-10-CM

## 2022-11-01 DIAGNOSIS — K58.0 IRRITABLE BOWEL SYNDROME WITH DIARRHEA: ICD-10-CM

## 2022-11-01 PROCEDURE — 97802 MEDICAL NUTRITION INDIV IN: CPT | Mod: TEL,95

## 2022-11-07 ENCOUNTER — TRANSFERRED RECORDS (OUTPATIENT)
Dept: HEALTH INFORMATION MANAGEMENT | Facility: CLINIC | Age: 57
End: 2022-11-07

## 2022-11-23 ENCOUNTER — VIRTUAL VISIT (OUTPATIENT)
Dept: UROLOGY | Facility: CLINIC | Age: 57
End: 2022-11-23
Payer: COMMERCIAL

## 2022-11-23 DIAGNOSIS — N20.0 CALCULUS OF KIDNEY: Primary | ICD-10-CM

## 2022-11-23 PROCEDURE — 99213 OFFICE O/P EST LOW 20 MIN: CPT | Mod: 95 | Performed by: PHYSICIAN ASSISTANT

## 2022-11-23 ASSESSMENT — PAIN SCALES - GENERAL: PAINLEVEL: NO PAIN (0)

## 2022-11-23 NOTE — PROGRESS NOTES
Assessment/Plan:    Assessment & Plan   Tania was seen today for prevention.    Diagnoses and all orders for this visit:    Calculus of kidney  -     Potassium; Future  -     CT Abdomen Pelvis w/o Contrast; Future    Stone Management Plan  Stone Management 8/31/2022 10/14/2022 11/23/2022   Urinary Tract Infection No suspicion of infection No suspicion of infection No suspicion of infection   Renal Colic Asymptomatic at this time Asymptomatic at this time Asymptomatic at this time   Renal Failure No suspicion of renal failure No suspicion of renal failure No suspicion of renal failure   Current CT date 8/30/2022 - -   Right sided stones? Yes - -   R Number of ureteral stones No ureteral stones - -   R Number of kidney stones  1 - -   R GSD of kidney stones 4 - 10 - -   R Hydronephrosis None - -   R Stone Event No current event No current event No current event   R Current Plan Observe Observe Observe   Observe rationale Limited stone burden with good prognosis for spontaneous passage Limited stone burden with good prognosis for spontaneous passage Limited stone burden with good prognosis for spontaneous passage   Left sided stones? No - -   L Stone Event No current event No current event No current event   L Current Plan - - -   Observe rationale - - -         PLAN    57 yo F with history of long standing, recurrent calcium based kidney stones, previous surgical stone interventions. Updated risk factors of normalized hypercalciuria on thiazide, and high urine sodium. Nonbostructing right renal stone, grown since 2019.     Based on review of findings with the patient, will check serum potassium to evaluate for hypokalemia. She will transition to long term management and return in 8 months with repeat 24 hour urine and imaging. Recommend she maintain increased fluid consumption to generate at least 2,000 mL urine daily and maintain restricted daily sodium intake to less than 2,500 mg/day and maintain  chlorthalidone.    Video call duration: 15 minutes  Provider off-site  20 minutes spent on the date of the encounter doing chart review, history and exam, documentation and further activities per the note    Sigrid Alvarez PA-C  [facility]    HPI  Ms. Tania Kaufman is a 56 year old  female who is being evaluated via a billable video visit by [facility] for follow up stone risk evaluation.     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 She has previously participated in stone risk evaluation with identified factors but is no longer adherent to recommendations. She has identified modifiable stone risks including:  thiazide responsive hypercalciuria and high urine sodium. She has identified non-modifiable stone risks including:  bilateral stones.     She is asymptomatic at present. She denies symptoms of fever, chills, flank pain, nausea, vomiting, urinary frequency and dysuria.     Evaluation of serum lab results demonstrates PTH 79 pg/mL, normal vitamin D, normal venous calcium and normal ionized calcium. One 24 hour urine collection demonstrates excellent urine volume (3.3 L), creatinine (1625 mg), normalized hypercalciuria, (207, previously 380 mg), moderate high urine sodium (229, previously 177 mmol), citrate (424 mg), oxalate (29 mg) and pH (6.9).     ROS   Review of systems is negative except for HPI.    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      PONV (postoperative nausea and vomiting)      PONV (postoperative nausea and vomiting)        Past Surgical History:   Procedure Laterality Date     EP ABLATION FOCAL AFIB N/A 6/29/2022    Procedure: EP Ablation Atrial Fibrilation;  Surgeon: Seven Moreau MD;  Location:  [facility]     NO PAST SURGERIES       OTHER SURGICAL HISTORY      wisdom teeth removal     RI CYSTO/URETERO W/LITHOTRIPSY &INDWELL STENT INSRT Right 6/11/2019    Procedure: CYSTOSCOPY, RIGHT RETROGRADE,  RIGHT URETEROSCOPY WITH LASER LITHOTRIPSY AND STENT INSERTION;  Surgeon: John Gutierrez MD;  Location: [facility];  Service: Urology       Current Outpatient Medications   Medication Sig Dispense Refill     aspirin (ASA) 81 MG EC tablet Take 1 tablet (81 mg) by mouth daily       chlorthalidone (HYGROTON) 25 MG tablet Take 1 tablet (25 mg) by mouth daily for 30 days 30 tablet 1     GLUCOSAMINE SULFATE (GLUCOSAMINE ORAL) Take 1 tablet by mouth daily        L.ACID/L.CASEI/B.BIF/B.LUKE/FOS (PROBIOTIC BLEND ORAL) Take 1 tablet by mouth 2 times daily as needed        melatonin 3 mg Tab tablet [MELATONIN 3 MG TAB TABLET] Take 3 mg by mouth bedtime as needed.       multivitamin therapeutic (THERAGRAN) tablet [MULTIVITAMIN THERAPEUTIC (THERAGRAN) TABLET] Take 1 tablet by mouth daily.       NON FORMULARY daily Vitamins - Melaleuca         No Known Allergies    Social History     Socioeconomic History     Marital status:      Spouse name: Not on file     Number of children: Not on file     Years of education: Not on file     Highest education level: Not on file   Occupational History     Not on file   Tobacco Use     Smoking status: Never     Smokeless tobacco: Never   Substance and Sexual Activity     Alcohol use: Yes     Comment: Alcoholic Drinks/day: social only     Drug use: No     Sexual activity: Not on file   Other Topics Concern     Not on  file   Social History Narrative     Not on file     Social Determinants of Health     Financial Resource Strain: Not on file   Food Insecurity: Not on file   Transportation Needs: Not on file   Physical Activity: Not on file   Stress: Not on file   Social Connections: Not on file   Intimate Partner Violence: Not on file   Housing Stability: Not on file       Family History   Problem Relation Age of Onset     Cancer Mother         skin     Pacemaker Mother         4 open heart surgeries      Diverticulitis Father      Breast Cancer Other 40.00        great aunt     Urolithiasis Brother         recurrent     Gout No family hx of        Objective:     Appears AAO x 3  No vitals obtained due to virtual visit

## 2022-12-08 ENCOUNTER — HOSPITAL ENCOUNTER (OUTPATIENT)
Dept: MAMMOGRAPHY | Facility: CLINIC | Age: 57
Discharge: HOME | End: 2022-12-08
Attending: FAMILY MEDICINE | Admitting: FAMILY MEDICINE
Payer: COMMERCIAL

## 2022-12-08 DIAGNOSIS — Z12.31 VISIT FOR SCREENING MAMMOGRAM: ICD-10-CM

## 2022-12-08 PROCEDURE — 77067 SCR MAMMO BI INCL CAD: CPT

## 2022-12-13 NOTE — PROGRESS NOTES
"OUTPATIENT CLINICAL NUTRITION SERVICES FOLLOW-UP     Telephone-Visit Details    Type of service: Telephone Visit    Start Time: 11:31 am    End Time: 11:48 am approx.     Originating Location (pt. Location): Home    Distant Location (provider location): Off-site - working remotely from home    Mode of Communication: phone call to pt's phone number listed in chart    Rina Hayes RD    REASON FOR ASSESSMENT  Tania Kaufman referred by Rosana Thomas MD for MNT related to  K58.0 (ICD-10-CM) - Irritable bowel syndrome with diarrhea  R82.994 (ICD-10-CM) - Hypercalciuria  N20.0 (ICD-10-CM) - Kidney stone  *discussed weight management, fasting questions during this visit. Did not specifically address issues above.- Pt briefly touched on kidney stone avoidance by eating foods high in potassium - writer encouraged.     Irritable Bowel  renal stone    Patient accompanied by: N/A    NEW FINDINGS:   How are things since our last visit?   Feels that she is doing okay overall but is having difficult time staying on \"track\" with her plan that she had made because of the holiday time period. It was also her birthday recently so she feels that she ate a lot of yummy food that made it hard to stick to healthy meals.     Any successes you would like to celebrate? Any concerns or difficulties you would like to work through?  Has been cooking a meal on Sunday and feels that this has been very helpful. Realized that meat typically will only stay okay for a few days and then will be unsafe to eat so is having difficulty managing this. Typically will have food for a few days after Sunday but then is having trouble cooking on Wednesday.     Did you receive the handouts? Did you find them helpful? Any specific questions regarding the information?  Yes, she found that they were very helpful and would like to print them off. Does not have any specific questions or concerns offered, felt that because her diet is so restrictive that it " provided food ideas/options that she hadn't thought of.     Are there specific topics you would like to focus on or prioritize today?  Is interested in starting fasting - feels that a lot of people she knows have done it and lost weight, find it helpful. She was researching a bit about fasting and is confused because there's so many different types out there and ways to go about it. Is not sure how she should start because sometimes she goes a long time without food and feels totally fine, other times will feel weak and shaky.     Previous Nutrition Goals:  1) Sundays Meal Prepping using MyPlate - vegetables and more lean meat choices  Proteins: chicken, salmon, turkeys  Vegetables: broccoli, cauliflower, carrots, green beans, vegetables in light ranch, sour cream   Grain: rice - usually white or sweta rice - brown would be good, potatoes, sourdough bread, wheat bread  Dairy: yogurt Ch[employer] Light and Fit greek yogurt   Snacks throughout the day usually, depends  Evaluation: Partially Met, ongoing/progressing. Is meal prepping on Sunday and cooks leftovers for several days. Then realizing that she runs of of meals for the rest of the week and does not have much motivation to cook during the week to prepare the meals she needs until next Sunday.    Previous Nutrition Follow Up / Monitoring:  Progress towards goals will be monitored and evaluated per protocol and Practice Guidelines    Previous Nutrition Diagnosis:  Food and nutrition-related knowledge deficit related to GI and kidney conditions as evidenced by pt desire for assistance and current limiting of dietary choices per dietary recall  Evaluation: Improving    Newest Food Record  Did not inquire about newest food record. Pt is restricted in the amounts and types of foods she eats - session primarily focused on times of eating, not so much types of foods.    Physical Activity: walk three miles per day, light weight resistance, yoga. Is wanting to get back  "into exercise because now she is able to - had ablation for a fib.  Days per week: N/A  Duration: N/A  Activity type: N/A  Limitations: none, even when had a fib was still able to try but then heart rate would increase a lot  *did not discuss physical activity    ANTHROPOMETRICS   Height: 5' 5\" (1.651 m)  Weight: 217 lbs (98.4 kg)  BMI (kg/m2): 36.1  Weight Status: Obesity Grade II BMI 35-39.9  IBW: 125 lbs  ADJ BW: 148 lbs  %IBW: 174  Weight History:      Wt Readings from Last 15 Encounters:   09/30/22 98.4 kg (217 lb)   09/28/22 98.9 kg (218 lb 1.6 oz)   08/10/22 98.9 kg (218 lb)   07/12/22 99.8 kg (220 lb)   06/29/22 100 kg (220 lb 8 oz)   06/21/22 101.6 kg (224 lb)   11/09/21 95.7 kg (211 lb)   10/06/21 95.6 kg (210 lb 12.8 oz)   06/22/21 97.7 kg (215 lb 6.4 oz)   06/22/21 97.7 kg (215 lb 6.4 oz)   10/19/20 98.3 kg (216 lb 12.8 oz)   03/05/20 96.2 kg (212 lb)   02/21/20 97.5 kg (215 lb)   02/10/20 96.6 kg (213 lb)   06/11/19 94.3 kg (208 lb)   Slight weight loss since 6/22 - 3.1% in approx. 4 months (not considered significant for malnutrition)     What is your UBW? Have you noticed any changes to your weight recently?  150-160 lbs, is around the weight when she had her kids. Was previously at 150-160 and was very active, slowly has gained weight. Would like to get back to that weight, feels starting exercise again will help with this.    Updated BW since we last met?  Plans to weigh herself today because she is very excited to start fasting. Is hoping to lose weight for a trip to [place] in about 3 months. Family is going to a trip with a bunch of others to celebrate son's graduation. Verified that she would like to lose weight for this trip - did not discuss current weight.      Dosing weight: 67.3 kg using ADJ BW     ASSESSED NUTRITION NEEDS  Estimated Energy Needs: 1,346-1,683 kcals/day (20-25 Kcal/Kg)  Justification: (maintenance)  Estimated Protein Needs: 54-67 grams protein/day (0.8-1 g " pro/Kg)  Justification: (preservation of lean body mass)  Estimated Fluid Needs: 2,019 mL/day (30 mL/kg)    DIAGNOSIS   Nutrition Diagnosis: Food and nutrition-related knowledge deficit related to GI and kidney conditions as evidenced by pt desire for continued assistance and current limiting of dietary choices per dietary recall    INTERVENTIONS   Nutrition Prescription: general, healthy eating guidelines following the MyPlate method. Ensuring consistent intake of 5-6 small meals/snacks and focusing on avoiding skipping meals. Increasing fiber intake gradually. Clear liquid diets during flares of diverticulitis and ensuring that intake of fiber is low during inflammatory state. Choosing grains that are refined, white and making sure that labels on products mention less than 2 grams per serving. Cook vegetables and use canned fruits rather than fresh. Consume 6-8 cups of water in addition to other beverages. Focus on a low-fat diet, avoiding spicy, greasy, fried, caffeine, or mint if have GERD. Slowly add fiber back into diet until consuming 25-35 grams (very high fiber not beneficial). Plenty of WGs, fruits, and vegetables. Incorporation of plenty of lean proteins and plant-based protein sources. Ensure adequate fluid intake to prevent kidney stone formation. Reduce urine calcium via reducing total salt intake, balancing diet - high acidic load on body need sto be reduced. Avoid high acid foods such as seafood, cheese, meat. Decrease acidity of urine by increasing fruit and vegetable intake    IMPLEMENTATION   Assessed learning needs and learning preference: N/A  Teaching Method(s) used: Explanation    Nutrition Education (Content):              a)  Discussed: trying out fasting in a safe and cautious way. Ensuring that intake of calories remains in time window to make sure effective. Monitoring how pt feels to ensure that she is not shaky, weak - discussed readjusting in this case. Types of foods that are best to  "\"break\" the fast - answered pt's questions regarding this. Importance of lower fat foods, avoiding very high CHO foods that may spike blood sugar quickly. Expressed concern for dangerous nature of hypoglycemia. Encouraged moderate nightly fasting as opposed to 24-48 hour fasting periods, explained way that fasting works and how body uses glucose stores, etc. Discussed importance of a way of eating that is sustainable for pt - encouraged pt to monitor how she feels and affirmed readjustment as able. Went over when to take pt's FiberWise supplement. Encouraged intake of potassium rich foods - per pt's kidney stone issues. Pt expressed desire to eat only once per day - hesitant to encourage - discussed checking in with self and how she feels.               b)  No handouts provided during this follow-up    Previous info provided to email: Eat Right with MyPlate, Health Snack List, High-Calcium Foods List (2018), 20 Ways to Enjoy More Fruits and Vegetables     Website links:   1. https://www.[employer]/food/planning-and-prep/smart-shopping/3-strategies-for-iajzaupxiy-eqaa-qlmehecw  2. https://www.[employer].gov/myplate-kitchen/recipes    Nutrition Education (Application):              a)  Discussed current eating plans and/or recommended alternative food choices              b)  Patient verbalizes understanding of diet by creating goals that reflect diet recommendations and offering no further questions or concerns at the end of the meeting.      Anticipate good compliance   Stage of Change: preparation with some action  Additional: pt is very motivated and interested in making this change, unclear how pt will tolerate - therefore will have close follow-up with readjustment as needed    GOALS  1. Try out 6 hour eating window from 12 pm to 6 pm. Will readjust depending on schedule to ensure time frame remains the same. Plan to check in with RD in about 3 weeks (Follow-up scheduled for Jan 3rd) to see how she is feeling. "     FOLLOW UP/MONITORING   Progress towards goals will be monitored and evaluated per protocol and Practice Guidelines    Time Spent with Patient  Approx. 48 minutes    Rina Hayes RD, LD  Clinical Dietitian  Office: [phone]  Weekend pager: [phone]

## 2022-12-14 ENCOUNTER — HOSPITAL ENCOUNTER (OUTPATIENT)
Dept: NUTRITION | Facility: CLINIC | Age: 57
Discharge: HOME | End: 2022-12-14
Admitting: FAMILY MEDICINE
Payer: COMMERCIAL

## 2022-12-14 PROCEDURE — 97803 MED NUTRITION INDIV SUBSEQ: CPT | Mod: TEL,95

## 2023-01-02 NOTE — PROGRESS NOTES
OUTPATIENT CLINICAL NUTRITION SERVICES FOLLOW-UP      Telephone-Visit Details     Type of service: Telephone Visit     Start Time: 11:30 am     End Time: approx. 12:09 am    Originating Location (pt. Location): Home     Distant Location (provider location): On-site     Mode of Communication: phone call to pt's phone number listed in chart     Rina Hayes RD     REASON FOR ASSESSMENT  Tania K Mandeep referred by Rosana Thomas MD for MNT related to  K58.0 (ICD-10-CM) - Irritable bowel syndrome with diarrhea  R82.994 (ICD-10-CM) - Hypercalciuria  N20.0 (ICD-10-CM) - Kidney stone  Did not specifically address issues above - pt is requesting help with weight management     Irritable Bowel  renal stone     Patient accompanied by: N/A     NEW FINDINGS:   How are things since our last visit?   Feels that things are going really great - December 14th - 219 lbs, this morning was 204 lbs this AM  Has been fasting in window - sometimes is really hungry and other times is not hungry at all   Ate holiday food and still lost weight  Has bought a food scale and is excited to use it  Has friend that is doing fasting with her, feels like his fasting is very extreme and is wondering if she should be doing what he is doing ex. Fasting for 48 hours, cutting out fruit and carbs, exercising outside of eating window  Expressed some disordered thinking around weighing herself - weighing herself before and after taking a shower to see if her body absorbed water, weighing herself at same time twice per day    Previous Nutrition Goals:  1) Try out 6 hour eating window from 12 pm to 6 pm. Will readjust depending on schedule to ensure time frame remains the same.   Evaluation: ongoing, progressing - has been doing great with goal, is very motivated to eat within eating window. Did try to fast for 22 hours and reported that it did not feel good, so she likely will not do that again.     Previous Nutrition Follow Up / Monitoring:  Progress  "towards goals will be monitored and evaluated per protocol and Practice Guidelines     Previous Nutrition Diagnosis:  Food and nutrition-related knowledge deficit related to GI and kidney conditions as evidenced by pt desire for assistance and current limiting of dietary choices per dietary recall  Evaluation: ongoing, progressing     Newest Food Record  Toast with hummus, cucumbers, banana  Snack - cut cucumbers with sour cream, cottage cheese with fruit, vegetable juice w/ carrots, tumeric, greens with fruit blended in  Air fryer - chicken breast, turkey tacos with just meat, sour cream, cheese, tomatoes  Roasted chicken white meat, dressing *reports not eating \"any junk\" for holidays     Physical Activity: walk three miles per day, light weight resistance, yoga. Is wanting to get back into exercise because now she is able to - had ablation for a fib.  Days per week: N/A  Duration: N/A  Activity type: N/A  Limitations: none, even when had a fib was still able to try but then heart rate would increase a lot  *At follow up- pt reports still working out - walking, doing band resistance training, etc.      ANTHROPOMETRICS   Height: 5' 5\" (1.651 m)  Weight: 217 lbs (98.4 kg)  BMI (kg/m2): 36.1  Weight Status: Obesity Grade II BMI 35-39.9  IBW: 125 lbs  ADJ BW: 148 lbs  %IBW: 174  Weight History:        Wt Readings from Last 15 Encounters:   09/30/22 98.4 kg (217 lb)   09/28/22 98.9 kg (218 lb 1.6 oz)   08/10/22 98.9 kg (218 lb)   07/12/22 99.8 kg (220 lb)   06/29/22 100 kg (220 lb 8 oz)   06/21/22 101.6 kg (224 lb)   11/09/21 95.7 kg (211 lb)   10/06/21 95.6 kg (210 lb 12.8 oz)   06/22/21 97.7 kg (215 lb 6.4 oz)   06/22/21 97.7 kg (215 lb 6.4 oz)   10/19/20 98.3 kg (216 lb 12.8 oz)   03/05/20 96.2 kg (212 lb)   02/21/20 97.5 kg (215 lb)   02/10/20 96.6 kg (213 lb)   06/11/19 94.3 kg (208 lb)   Slight weight loss since 6/22 - 3.1% in approx. 4 months (not considered significant for malnutrition)     What is your UBW? " Have you noticed any changes to your weight recently?  150-160 lbs, is around the weight when she had her kids. Was previously at 150-160 and was very active, slowly has gained weight. Would like to get back to that weight, feels starting exercise again will help with this.     Updated BW since we last met?  Plans to weigh herself today because she is very excited to start fasting. Is hoping to lose weight for a trip to [place] in about 3 months. Family is going to a trip with a bunch of others to celebrate son's graduation. Verified that she would like to lose weight for this trip - did not discuss current weight.     Have you been weighing yourself at home since we last talked?   December 14th - 219, this morning was 204 lbs this AM. Lost 15 lbs in about 3 weeks, goal is to lose another 35 lbs before her trip in 9 weeks.      Dosing weight: 67.3 kg using ADJ BW     ASSESSED NUTRITION NEEDS  Estimated Energy Needs: 1,346-1,683 kcals/day (20-25 Kcal/Kg)  Justification: (maintenance)  Estimated Protein Needs: 54-67 grams protein/day (0.8-1 g pro/Kg)  Justification: (preservation of lean body mass)  Estimated Fluid Needs: 2,019 mL/day (30 mL/kg)     DIAGNOSIS   Nutrition Diagnosis: Food and nutrition-related knowledge deficit related to GI and kidney conditions as evidenced by pt desire for continued assistance and current limiting of dietary choices per dietary recall     INTERVENTIONS   Nutrition Prescription: general, healthy eating guidelines following the MyPlate method. Ensuring consistent intake of 5-6 small meals/snacks and focusing on avoiding skipping meals. Increasing fiber intake gradually. Clear liquid diets during flares of diverticulitis and ensuring that intake of fiber is low during inflammatory state. Choosing grains that are refined, white and making sure that labels on products mention less than 2 grams per serving. Cook vegetables and use canned fruits rather than fresh. Consume 6-8 cups of water  in addition to other beverages. Focus on a low-fat diet, avoiding spicy, greasy, fried, caffeine, or mint if have GERD. Slowly add fiber back into diet until consuming 25-35 grams (very high fiber not beneficial). Plenty of WGs, fruits, and vegetables. Incorporation of plenty of lean proteins and plant-based protein sources. Ensure adequate fluid intake to prevent kidney stone formation. Reduce urine calcium via reducing total salt intake, balancing diet - high acidic load on body need sto be reduced. Avoid high acid foods such as seafood, cheese, meat. Decrease acidity of urine by increasing fruit and vegetable intake     IMPLEMENTATION   Assessed learning needs and learning preference: N/A  Teaching Method(s) used: Explanation     Nutrition Education (Content):              a)  Discussed: went over how pt is doing, how fasting is going, answered pt's questions regarding what she should be eating, how often she should be eating, types of foods, etc. Affirmed pt's choices to listen to her body and avoid extreme fasting. Discussed how the body fasts, how quickly weight changes, importance of avoiding changes to diet based on fluctuations in weight over a day. Importance of spreading out time window if feeling poorly, protein intake to avoid LBM wasting and exercising when eating adequately. Increasing calorie within window vs. eating outside of window. What to expect with future weight changes, focusing less on reliance on weight fluctuations/moreso trends over time. Nutrient intake with variety but okay to stay where she is in terms of food variety, encouraged eating enough calories and listening to body. Etc.               b)  No handouts provided for this follow-up.      Previous info provided to email: Eat Right with MyPlate, Health Snack List, High-Calcium Foods List (2018), 20 Ways to Enjoy More Fruits and Vegetables     Website links:   1.  https://www.eatright.org/food/planning-and-prep/smart-shopping/3-strategies-for-xvearvfreq-tatd-cozgmcoh  2. https://www.[employer].gov/myplate-kitchen/recipes     Nutrition Education (Application):              a)  Discussed current eating plans and/or recommended alternative food choices              b)  Patient verbalizes understanding of diet by creating goals that reflect diet recommendations and offering no further questions or concerns at the end of the meeting.       Anticipate good compliance   Stage of Change: action, maintenance   Additional: pt is very motivated by current weight loss, acknowledges that there might not be as quick weight loss in the future, is willing to monitor and make changes to eating window depending on body cues. Is very appreciative of guidance and appears very willing to avoid extremes.     GOALS  1. Continue with 12 pm to 6 pm fasting window until next appointment together, encouraged to adjust as needed depending on how body feels     FOLLOW UP/MONITORING   Progress towards goals will be monitored and evaluated per protocol and Practice Guidelines     Time Spent with Patient  Approx. 39 minutes     Rina Hayes RD, LD  Clinical Dietitian  Office: [phone]  Weekend pager: [phone]

## 2023-01-03 ENCOUNTER — HOSPITAL ENCOUNTER (OUTPATIENT)
Dept: NUTRITION | Facility: CLINIC | Age: 58
Discharge: HOME | End: 2023-01-03
Admitting: FAMILY MEDICINE
Payer: COMMERCIAL

## 2023-01-03 PROCEDURE — 97803 MED NUTRITION INDIV SUBSEQ: CPT | Mod: TEL,95

## 2023-04-17 ENCOUNTER — OFFICE VISIT (OUTPATIENT)
Dept: FAMILY MEDICINE | Facility: CLINIC | Age: 58
End: 2023-04-17
Payer: COMMERCIAL

## 2023-04-17 VITALS
BODY MASS INDEX: 33.42 KG/M2 | SYSTOLIC BLOOD PRESSURE: 96 MMHG | OXYGEN SATURATION: 97 % | HEART RATE: 76 BPM | DIASTOLIC BLOOD PRESSURE: 66 MMHG | HEIGHT: 63 IN | WEIGHT: 188.6 LBS

## 2023-04-17 DIAGNOSIS — Z12.4 CERVICAL CANCER SCREENING: ICD-10-CM

## 2023-04-17 DIAGNOSIS — Z86.79 S/P ABLATION OF ATRIAL FIBRILLATION: ICD-10-CM

## 2023-04-17 DIAGNOSIS — E03.9 HYPOTHYROIDISM, UNSPECIFIED TYPE: ICD-10-CM

## 2023-04-17 DIAGNOSIS — E03.8 SUBCLINICAL HYPOTHYROIDISM: ICD-10-CM

## 2023-04-17 DIAGNOSIS — R82.994 HYPERCALCIURIA: ICD-10-CM

## 2023-04-17 DIAGNOSIS — I48.0 PAROXYSMAL ATRIAL FIBRILLATION (H): ICD-10-CM

## 2023-04-17 DIAGNOSIS — Z98.890 S/P ABLATION OF ATRIAL FIBRILLATION: ICD-10-CM

## 2023-04-17 DIAGNOSIS — N20.0 KIDNEY STONE: ICD-10-CM

## 2023-04-17 DIAGNOSIS — Z00.00 ROUTINE GENERAL MEDICAL EXAMINATION AT A HEALTH CARE FACILITY: Primary | ICD-10-CM

## 2023-04-17 DIAGNOSIS — Z12.11 SCREEN FOR COLON CANCER: ICD-10-CM

## 2023-04-17 PROBLEM — R11.2 PONV (POSTOPERATIVE NAUSEA AND VOMITING): Status: RESOLVED | Noted: 2022-06-21 | Resolved: 2023-04-17

## 2023-04-17 PROBLEM — Z30.431 SURVEILLANCE OF PREVIOUSLY PRESCRIBED INTRAUTERINE CONTRACEPTIVE DEVICE: Status: RESOLVED | Noted: 2022-09-28 | Resolved: 2023-04-17

## 2023-04-17 PROBLEM — F41.1 GENERALIZED ANXIETY DISORDER: Status: RESOLVED | Noted: 2022-09-28 | Resolved: 2023-04-17

## 2023-04-17 PROBLEM — K57.32 DIVERTICULITIS OF COLON: Status: ACTIVE | Noted: 2021-07-01

## 2023-04-17 PROBLEM — L98.8 VASCULAR DISORDER OF SKIN: Status: ACTIVE | Noted: 2022-09-28

## 2023-04-17 LAB
ANION GAP SERPL CALCULATED.3IONS-SCNC: 15 MMOL/L (ref 7–15)
BUN SERPL-MCNC: 13.3 MG/DL (ref 6–20)
CALCIUM SERPL-MCNC: 10.1 MG/DL (ref 8.6–10)
CHLORIDE SERPL-SCNC: 97 MMOL/L (ref 98–107)
CREAT SERPL-MCNC: 0.76 MG/DL (ref 0.51–0.95)
DEPRECATED HCO3 PLAS-SCNC: 27 MMOL/L (ref 22–29)
GFR SERPL CREATININE-BSD FRML MDRD: >90 ML/MIN/1.73M2
GLUCOSE SERPL-MCNC: 99 MG/DL (ref 70–99)
HGB BLD-MCNC: 13.8 G/DL (ref 11.7–15.7)
POTASSIUM SERPL-SCNC: 3 MMOL/L (ref 3.4–5.3)
SODIUM SERPL-SCNC: 139 MMOL/L (ref 136–145)
TSH SERPL DL<=0.005 MIU/L-ACNC: 3.73 UIU/ML (ref 0.3–4.2)

## 2023-04-17 PROCEDURE — 80048 BASIC METABOLIC PNL TOTAL CA: CPT | Performed by: FAMILY MEDICINE

## 2023-04-17 PROCEDURE — 85018 HEMOGLOBIN: CPT | Performed by: FAMILY MEDICINE

## 2023-04-17 PROCEDURE — 84443 ASSAY THYROID STIM HORMONE: CPT | Performed by: FAMILY MEDICINE

## 2023-04-17 PROCEDURE — 36415 COLL VENOUS BLD VENIPUNCTURE: CPT | Performed by: FAMILY MEDICINE

## 2023-04-17 PROCEDURE — 99396 PREV VISIT EST AGE 40-64: CPT | Performed by: FAMILY MEDICINE

## 2023-04-17 RX ORDER — CLOBETASOL PROPIONATE 0.5 MG/G
CREAM TOPICAL PRN
COMMUNITY
Start: 2023-04-04 | End: 2024-04-19

## 2023-04-17 ASSESSMENT — ENCOUNTER SYMPTOMS
NERVOUS/ANXIOUS: 0
PALPITATIONS: 0
COUGH: 0
CONSTIPATION: 0
BREAST MASS: 0
ARTHRALGIAS: 0
DIARRHEA: 0
HEADACHES: 0
MYALGIAS: 0
HEMATOCHEZIA: 0
PARESTHESIAS: 0
NAUSEA: 0
WEAKNESS: 0
SORE THROAT: 0
EYE PAIN: 0
FEVER: 0
JOINT SWELLING: 0
DYSURIA: 0
SHORTNESS OF BREATH: 0
CHILLS: 0
ABDOMINAL PAIN: 0
HEARTBURN: 0
FREQUENCY: 0
DIZZINESS: 0
HEMATURIA: 0

## 2023-04-17 NOTE — PATIENT INSTRUCTIONS
"    Dr. Dayana Arenas's book for everyone:  \"How to Lose Weight for the Last Time: Brain Based Solutions for Permanent Weight Loss\"   Also her Podcast: Weight loss for Busy Physicians *   *just insert your own career/barriers with any reference to busy physician world and you will still get phenomenal tips for the mindset changes needed for sustained weight loss.        Weight Loss Strategies for Success: (start small, pick 1 or 2 goals each week)    Stop snacking. Three meals a day only. This is THE MAJOR  for insulin resistance. We need to change the insulin resistance to have sustained weight loss.   Keep a food log- apps like PLDT are very helpful for this. (Not to pay attention to the calories but just to track the food).   Plan your food log in advance- Night prior: pick out what you want to eat for the next day for your 3 meals and stick to it: protein, healthy fat, veggie for example at every meal.  High protein (60g/day): Try to get protein in at least every 3.5 hours during the day to avoid a hunger surge late in the day.  If hungry, start with a protein serving, followed by water and then a crunchy vegetable that requires chewing (this decreases the hunger hormone).   Use plenty of healthy fats (olive oil, avocados, nuts) when cooking which will make you feel more satisfied.    Be intentional and think about what you are eating and feel the food fuel you.  Have a  \"Table, chair, plate\" with every meal. Sit down to eat your food!  Brush your teeth after the last meal of the day. Prevents evening snacking.   Avoid sugary beverages. (pop, fancy coffees). Reduce alcohol.   Drink water instead.   No fast food (or reduce meals out to a specific #/week). Eat at home 90% of the time.  Start the morning with breakfast.  Choose one day to be \"meal planning\" and/or \"meal prep\" days. Plan ahead for grocery shopping for healthy food choices, and spend 30min-1hr each week prepping your fruits/veggies " (wash, chop, store in easy grab portions).  Make it easy to grab the protein (cut up chicken breasts, greek yogurt containers, hard boiled eggs, etc)  Start low intensity exercise 30 minutes/day (walking/hiking/yard work).  Goal for 10,000 steps per day (consider a FitBit or other tracking device).  Daily weights can help- but just use it as a data point; don't get stuck in the thoughts around that number.       There is a new shingles vaccine that is 97% effective. It is the Shingrix vaccine and is recommended for those 50 and older. We recommend the vaccine even if you have had shingles or the older vaccine against shingles- Zostavax. Insurance coverage for the vaccine varies. I recommend you check with your insurance to verify if, when and where it is covered. The vaccine is covered by most commercial insurance but Medicare does not cover the vaccine. It may be covered by Medicare Part D (your drug/pharmacy plan) and sometimes it is covered only at a pharmacy instead of here in the clinic. If it is covered in the clinic, you may schedule a nurse visit anytime to get the first dose of the vaccine. The second dose is recommended two months after the first and should be gotten by 6 months after the first.   About 10% of people will get a sore, red reaction at the site of the injection. Some people may also feel a little sick or nauseated after the injection. This may happen with either the first and/or second vaccine.

## 2023-04-17 NOTE — PROGRESS NOTES
SUBJECTIVE:   CC: Eden is an 57 year old who presents for preventive health visit.       4/17/2023     3:09 PM   Additional Questions   Roomed by Radha GONZALEZ LPN   Patient has been advised of split billing requirements and indicates understanding: Yes  Healthy Habits:     Getting at least 3 servings of Calcium per day:  NO    Bi-annual eye exam:  Yes    Dental care twice a year:  Yes    Sleep apnea or symptoms of sleep apnea:  None    Diet:  Other    Frequency of exercise:  6-7 days/week    Duration of exercise:  Greater than 60 minutes    Taking medications regularly:  Yes    Medication side effects:  None    PHQ-2 Total Score: 0    Additional concerns today:  No    Chief Complaint   Patient presents with     Physical     Not fasting     3 years since our last visit  No major concerns. Has a small bump at top of left foot that bothers her occasionally; was bigger but smaller now. Declines further work up/eval at this time.     Pap and labs done in the last month; JUAREZ signed for [facility]  Declines repeat lipids today; ok for diabetes/hb check though.     Hx of recurrent kidney stones (10mm with surgical removal; 3 mm stone 3 yr ago passed spontaneously; has a 6mm stone still there); hypercalciuria dx on work up with urology so she is now on chlorthalidone. The post chlorthalidone testing showed some improvement . Watching her diet and drinks 10 bottles of water (20oz servings)    Working on weight loss. BMI 33.   Closer to 223lbs last year and lost 40lbs with intermittent fasting since Dec 2022.   She feels she is sleeping better and joints are better.     Hx of afib.  Dx 2/2020; She underwent pulmonary vein isolation ablation 6/29/2022. She had recurrent AF on 7/11/2022.  She was planned for cardioversion, but converted back to sinus rhythm spontaneously on 7/14/2022. No longer on any rate nor rhythm control nor anticoagulation aside from low dose ASA.   In the past her afib would act up with walking/any  "exercise  Now s/p cardiac ablation for her afib and \"feel like a new woman!\"  Able to tolerate exercise    Walks 1.5hr in AM and PM- 5 to 8 miles a day  Getting 1200-1400kcal per day    She has a history of shingles when her daughter  She did get valtrex from another provider for that  Her sister had a shingles vaccine (not sure which one) and when she later got shingles it was very severe. Worried about what to expect or not.       She's had 2 episodes of diverticulitis at this point.     Wt Readings from Last 3 Encounters:   23 85.5 kg (188 lb 9.6 oz)   22 98.4 kg (217 lb)   22 98.9 kg (218 lb 1.6 oz)     Today's PHQ-2 Score:       2023     3:02 PM   PHQ-2 (  [employer])   Q1: Little interest or pleasure in doing things 0   Q2: Feeling down, depressed or hopeless 0   PHQ-2 Score 0   Q1: Little interest or pleasure in doing things Not at all    Not at all   Q2: Feeling down, depressed or hopeless Not at all    Not at all   PHQ-2 Score 0    0       Have you ever done Advance Care Planning? (For example, a Health Directive, POLST, or a discussion with a medical provider or your loved ones about your wishes): No, advance care planning information given to patient to review.  Patient plans to discuss their wishes with loved ones or provider.      Social History     Tobacco Use     Smoking status: Never     Smokeless tobacco: Never   Vaping Use     Vaping status: Not on file   Substance Use Topics     Alcohol use: Yes     Comment: Alcoholic Drinks/day: social only         2023     3:02 PM   Alcohol Use   Prescreen: >3 drinks/day or >7 drinks/week? No          View : No data to display.              Reviewed orders with patient.  Reviewed health maintenance and updated orders accordingly - Yes    Breast Cancer Screenin/17/2023     3:03 PM   Breast CA Risk Assessment (FHS-7)   Do you have a family history of breast, colon, or ovarian cancer? No / Unknown       Pertinent mammograms " "are reviewed under the imaging tab.    History of abnormal Pap smear: NO - age 30-65 PAP every 5 years with negative HPV co-testing recommended     Reviewed and updated as needed this visit by clinical staff   Tobacco  Allergies  Meds  Problems  Med Hx  Surg Hx  Fam Hx          Reviewed and updated as needed this visit by Provider   Tobacco  Allergies  Meds  Problems  Med Hx  Surg Hx  Fam Hx           Review of Systems   Constitutional: Negative for chills and fever.   HENT: Negative for congestion, ear pain, hearing loss and sore throat.    Eyes: Negative for pain and visual disturbance.   Respiratory: Negative for cough and shortness of breath.    Cardiovascular: Negative for chest pain, palpitations and peripheral edema.   Gastrointestinal: Negative for abdominal pain, constipation, diarrhea, heartburn, hematochezia and nausea.   Breasts:  Negative for tenderness, breast mass and discharge.   Genitourinary: Negative for dysuria, frequency, genital sores, hematuria, pelvic pain, urgency, vaginal bleeding and vaginal discharge.   Musculoskeletal: Negative for arthralgias, joint swelling and myalgias.   Skin: Negative for rash.   Neurological: Negative for dizziness, weakness, headaches and paresthesias.   Psychiatric/Behavioral: Negative for mood changes. The patient is not nervous/anxious.         OBJECTIVE:   BP 96/66 (BP Location: Left arm, Patient Position: Sitting, Cuff Size: Adult Large)   Pulse 76   Ht 1.61 m (5' 3.39\")   Wt 85.5 kg (188 lb 9.6 oz)   LMP 10/31/2018 (Approximate)   SpO2 97%   BMI 33.00 kg/m    Physical Exam  GENERAL: healthy, alert and no distress  EYES: Eyes grossly normal to inspection, PERRL and conjunctivae and sclerae normal  HENT: ear canals and TM's normal, nose and mouth without ulcers or lesions  NECK: no adenopathy, no asymmetry, masses, or scars and thyroid normal to palpation  RESP: lungs clear to auscultation - no rales, rhonchi or wheezes  BREAST: " declined  CV: regular rate and rhythm, normal S1 S2, no S3 or S4, no murmur, click or rub, no peripheral edema and peripheral pulses strong  ABDOMEN: soft, nontender, no hepatosplenomegaly, no masses and bowel sounds normal  MS: no gross musculoskeletal defects noted, no edema  SKIN: no suspicious lesions or rashes  NEURO: Normal strength and tone, mentation intact and speech normal  PSYCH: mentation appears normal, affect normal/bright    Diagnostic Test Results:  Labs reviewed in Epic    ASSESSMENT/PLAN:   Tania was seen today for physical.    Diagnoses and all orders for this visit:    Routine general medical examination at a health care facility  -     REVIEW OF HEALTH MAINTENANCE PROTOCOL ORDERS  -     COLOGUARD([employer]); Future  -     ZOSTER VACCINE RECOMBINANT ADJUVANTED (SHINGRIX); Standing  -     TDAP VACCINE (Adacel, Boostrix)  -     Basic metabolic panel  (Ca, Cl, CO2, Creat, Gluc, K, Na, BUN); Future  -     TSH with free T4 reflex; Future  -     Hemoglobin; Future    Cervical cancer screening  JUAREZ sent to [facility]; pap normal in March 2023  Screen for colon cancer  -     BesstechUARD([employer]); Future    Hx of Paroxysmal atrial fibrillation (H)  S/P ablation of atrial fibrillation  Hx of afib.  Dx 2/2020; She underwent pulmonary vein isolation ablation 6/29/2022. She had recurrent AF on 7/11/2022.  She was planned for cardioversion, but converted back to sinus rhythm spontaneously on 7/14/2022. No longer on any rate nor rhythm control nor anticoagulation aside from low dose ASA.     Kidney stone  Hypercalciuria  Followed by urology; diet changes, continues chlorthalidone. Will check BMP today      Subclinical hypothyroidism  TSH minimally elevated in July; T4 was normal range; will recheck today; encouraged weight loss  -     TSH with free T4 reflex; Future  -     TSH with free T4 reflex    Other orders  -     PRIMARY CARE FOLLOW-UP SCHEDULING; Future        Patient has been advised  "of split billing requirements and indicates understanding: Yes      COUNSELING:  Reviewed preventive health counseling, as reflected in patient instructions    BMI:   Estimated body mass index is 33 kg/m  as calculated from the following:    Height as of this encounter: 1.61 m (5' 3.39\").    Weight as of this encounter: 85.5 kg (188 lb 9.6 oz).   Weight management plan: Discussed healthy diet and exercise guidelines      She reports that she has never smoked. She has never used smokeless tobacco.      Lashonda Arango MD  [facility]  "

## 2023-04-24 ENCOUNTER — LAB (OUTPATIENT)
Dept: FAMILY MEDICINE | Facility: CLINIC | Age: 58
End: 2023-04-24

## 2023-04-24 DIAGNOSIS — Z12.11 SCREEN FOR COLON CANCER: ICD-10-CM

## 2023-04-24 DIAGNOSIS — Z00.00 ROUTINE GENERAL MEDICAL EXAMINATION AT A HEALTH CARE FACILITY: ICD-10-CM

## 2023-06-26 ENCOUNTER — MYC MEDICAL ADVICE (OUTPATIENT)
Dept: FAMILY MEDICINE | Facility: CLINIC | Age: 58
End: 2023-06-26
Payer: COMMERCIAL

## 2023-06-26 NOTE — TELEPHONE ENCOUNTER
Sending to PCP for review.  Please review and advise on patient MyChart message.    Please advise if able to work patient into schedule or another provider.    Terry Klein RN  [facility]

## 2023-06-27 ENCOUNTER — TELEPHONE (OUTPATIENT)
Dept: UROLOGY | Facility: CLINIC | Age: 58
End: 2023-06-27
Payer: COMMERCIAL

## 2023-07-11 ENCOUNTER — TELEPHONE (OUTPATIENT)
Dept: UROLOGY | Facility: CLINIC | Age: 58
End: 2023-07-11
Payer: COMMERCIAL

## 2023-07-11 NOTE — TELEPHONE ENCOUNTER
Left message checking to see if patient received her one 24hr urine kit from [employer].  If she has not received the kit to let [facility] know so order can be refaxed to lab.  If she has completed the collection to let [facility] know to check on results.  Patient has upcoming virtual appointment to go over results on 7/24/23.

## 2023-07-13 ENCOUNTER — HOSPITAL ENCOUNTER (OUTPATIENT)
Dept: CT IMAGING | Facility: CLINIC | Age: 58
Discharge: HOME | End: 2023-07-13
Attending: UROLOGY | Admitting: UROLOGY
Payer: COMMERCIAL

## 2023-07-13 DIAGNOSIS — N20.0 CALCULUS OF KIDNEY: ICD-10-CM

## 2023-07-13 LAB — RADIOLOGIST FLAGS: ABNORMAL

## 2023-07-13 PROCEDURE — 74176 CT ABD & PELVIS W/O CONTRAST: CPT

## 2023-07-17 ENCOUNTER — TELEPHONE (OUTPATIENT)
Dept: UROLOGY | Facility: CLINIC | Age: 58
End: 2023-07-17
Payer: COMMERCIAL

## 2023-07-17 NOTE — TELEPHONE ENCOUNTER
Spoke with patient who is aware of a stone that appears to be passing on the right.  She is asymptomatic at the moment and will call as needed and will strain as able.  She did turn in her litholink kit today, will watch for results.  Shannon Campos RN

## 2023-07-21 LAB — NONINV COLON CA DNA+OCC BLD SCRN STL QL: NEGATIVE

## 2023-07-24 ENCOUNTER — VIRTUAL VISIT (OUTPATIENT)
Dept: UROLOGY | Facility: CLINIC | Age: 58
End: 2023-07-24
Payer: COMMERCIAL

## 2023-07-24 DIAGNOSIS — R82.994 HYPERCALCIURIA: ICD-10-CM

## 2023-07-24 DIAGNOSIS — E87.6 HYPOKALEMIA: Primary | ICD-10-CM

## 2023-07-24 DIAGNOSIS — N20.1 RIGHT URETERAL STONE: ICD-10-CM

## 2023-07-24 PROCEDURE — 99204 OFFICE O/P NEW MOD 45 MIN: CPT | Mod: GT | Performed by: UROLOGY

## 2023-07-24 RX ORDER — SILODOSIN 4 MG/1
4 CAPSULE ORAL DAILY
Qty: 30 CAPSULE | Refills: 0 | Status: SHIPPED | OUTPATIENT
Start: 2023-07-24 | End: 2023-08-22

## 2023-07-24 RX ORDER — CHLORTHALIDONE 25 MG/1
12.5 TABLET ORAL DAILY
Qty: 90 TABLET | Refills: 1
Start: 2023-07-24 | End: 2023-11-24

## 2023-07-24 RX ORDER — POTASSIUM CITRATE 15 MEQ/1
15 TABLET, EXTENDED RELEASE ORAL 2 TIMES DAILY WITH MEALS
Qty: 180 TABLET | Refills: 1 | Status: SHIPPED | OUTPATIENT
Start: 2023-07-24 | End: 2024-04-19

## 2023-07-24 ASSESSMENT — PAIN SCALES - GENERAL: PAINLEVEL: MILD PAIN (2)

## 2023-07-24 NOTE — PROGRESS NOTES
Patient is roomed via telephone for a telehealth visit.  Patient confirmed is in the [facility] at the time of this appointment.  Patient understand that this visit is billable and agree to proceed with appointment.

## 2023-07-24 NOTE — PROGRESS NOTES
Virtual Visit Details    Type of service:  Video Visit     Originating Location (pt. Location): Home    Distant Location (provider location):  On-site  Platform used for Video Visit: Tahira      Name: Tania Kaufman   MRN: [id number]  YOB: 1965    Assessment and Plan:  57 year old female with history of calcium stone disease with hypercalciuria on chlorthalidone.  She has treatment associated hypokalemia which is symptomatic.  She also has treatment related hypocitraturia likely due to metabolic acidosis and potassium wasting.  She also has a right ureteral stone that is currently passing.    1.  Right ureteral stone, new  2.  Right nephrolithiasis, stable  3.  Hypokalemia  4.  Metabolic acidosis with hypocitraturia    Given her hypokalemia, will have her use potassiums citrate 15 mEq twice daily as well as cutting her chlorthalidone dose in half to 12.5 mg daily.  We will have her recheck her potassium in 2 to 4 weeks to make sure it is coming up.    We will have her repeat a 24-hour urine supersaturation and assuming that her potassium is normal in 6 weeks with a follow-up visit.    She will strain her urine to ensure passage of the right ureteral stone.     Plan:  -Strain urine for right ureteral stone passage  -Start potassium citrate 15 mEq twice daily  -Lower chlorthalidone dose to 12.5 mg daily  -BMP in 2 weeks  -Litholink and follow-up in 6 to 8 weeks    Reinaldo Park MD  July 24, 2023         Chief Complaint: Stone follow-up    History of Present Illness:  Tania Kaufman is a 57 year old female seen with a history of mixed calcium stones.  Patient most recently underwent right ureteroscopy on June 2019 for stone disease.      Last 24 hr urine study on November 7, 2022 demonstrated hypocitraturia, high urine pH, and high urine sodium .  Recommended dietary changes of: decrease sodium, increase citrates, and increase plant and vegetable increase.  She has been on a thiazide  (chlorthalidone 25 mg daily) without potassium supplementation.  She had a recent potassium check on April 17, 2023 that was 3 (low).  She has had some symptoms such as fatigue, muscle weakness.    Most recent imaging (CT of the abdomen pelvis without contrast on July 13, 2023): 3 mm right ureteral stone without obstruction and 3 smaller stones on the right kidney.    Metabolic:  Blood work:  Potassium   Date Value Ref Range Status   04/17/2023 3.0 (L) 3.4 - 5.3 mmol/L Final   07/12/2022 4.0 3.5 - 5.0 mmol/L Final     Creatinine   Date Value Ref Range Status   04/17/2023 0.76 0.51 - 0.95 mg/dL Final     Calcium   Date Value Ref Range Status   04/17/2023 10.1 (H) 8.6 - 10.0 mg/dL Final     Magnesium   Date Value Ref Range Status   07/12/2022 2.1 1.8 - 2.6 mg/dL Final        24 hr urine study from July 16, 2023:           Allergies:  No Known Allergies         Medications:  Current Outpatient Medications   Medication Sig    aspirin (ASA) 81 MG EC tablet Take 1 tablet (81 mg) by mouth daily    chlorthalidone (HYGROTON) 25 MG tablet Take 1 tablet (25 mg) by mouth daily    clobetasol (TEMOVATE) 0.05 % external cream     GLUCOSAMINE SULFATE (GLUCOSAMINE ORAL) Take 1 tablet by mouth daily     L.ACID/L.CASEI/B.BIF/B.LUKE/FOS (PROBIOTIC BLEND ORAL) Take 1 tablet by mouth 2 times daily as needed     melatonin 3 mg Tab tablet [MELATONIN 3 MG TAB TABLET] Take 3 mg by mouth bedtime as needed.    multivitamin therapeutic (THERAGRAN) tablet [MULTIVITAMIN THERAPEUTIC (THERAGRAN) TABLET] Take 1 tablet by mouth daily.    NON FORMULARY daily Vitamins - Melaleuca     No current facility-administered medications for this visit.       Review of Systems:   ROS: 10 point ROS neg other than the symptoms noted above in the HPI.    Physical Exam:  B/P: Data Unavailable, T: Data Unavailable, P: Data Unavailable, R: Data Unavailable  Estimated body mass index is 33 kg/m  as calculated from the following:    Height as of 4/17/23: 1.61 m (5'  "3.39\").    Weight as of 4/17/23: 85.5 kg (188 lb 9.6 oz).  General: age-appropriate appearing female in NAD.  "

## 2023-08-01 ENCOUNTER — OFFICE VISIT (OUTPATIENT)
Dept: CARDIOLOGY | Facility: CLINIC | Age: 58
End: 2023-08-01
Attending: NURSE PRACTITIONER
Payer: COMMERCIAL

## 2023-08-01 ENCOUNTER — OFFICE VISIT (OUTPATIENT)
Dept: FAMILY MEDICINE | Facility: CLINIC | Age: 58
End: 2023-08-01
Payer: COMMERCIAL

## 2023-08-01 VITALS
DIASTOLIC BLOOD PRESSURE: 70 MMHG | BODY MASS INDEX: 30.45 KG/M2 | HEART RATE: 68 BPM | WEIGHT: 174 LBS | OXYGEN SATURATION: 98 % | SYSTOLIC BLOOD PRESSURE: 110 MMHG | RESPIRATION RATE: 16 BRPM

## 2023-08-01 VITALS
WEIGHT: 172.7 LBS | DIASTOLIC BLOOD PRESSURE: 72 MMHG | SYSTOLIC BLOOD PRESSURE: 108 MMHG | HEART RATE: 68 BPM | BODY MASS INDEX: 30.22 KG/M2

## 2023-08-01 DIAGNOSIS — M67.40 GANGLION CYST: ICD-10-CM

## 2023-08-01 DIAGNOSIS — I48.0 PAROXYSMAL ATRIAL FIBRILLATION (H): Primary | ICD-10-CM

## 2023-08-01 DIAGNOSIS — N20.0 KIDNEY STONE: ICD-10-CM

## 2023-08-01 DIAGNOSIS — Z98.890 S/P ABLATION OF ATRIAL FIBRILLATION: ICD-10-CM

## 2023-08-01 DIAGNOSIS — R19.5 ABNORMAL STOOL COLOR: Primary | ICD-10-CM

## 2023-08-01 DIAGNOSIS — I48.3 TYPICAL ATRIAL FLUTTER (H): ICD-10-CM

## 2023-08-01 DIAGNOSIS — K57.32 DIVERTICULITIS OF COLON: ICD-10-CM

## 2023-08-01 DIAGNOSIS — Z86.79 S/P ABLATION OF ATRIAL FIBRILLATION: ICD-10-CM

## 2023-08-01 DIAGNOSIS — E03.8 SUBCLINICAL HYPOTHYROIDISM: ICD-10-CM

## 2023-08-01 DIAGNOSIS — R82.994 HYPERCALCIURIA: ICD-10-CM

## 2023-08-01 LAB
ALBUMIN UR-MCNC: NEGATIVE MG/DL
APPEARANCE UR: CLEAR
BACTERIA #/AREA URNS HPF: ABNORMAL /HPF
BILIRUB UR QL STRIP: NEGATIVE
COLOR UR AUTO: YELLOW
GLUCOSE UR STRIP-MCNC: NEGATIVE MG/DL
HGB UR QL STRIP: ABNORMAL
KETONES UR STRIP-MCNC: NEGATIVE MG/DL
LEUKOCYTE ESTERASE UR QL STRIP: NEGATIVE
NITRATE UR QL: NEGATIVE
PH UR STRIP: 6.5 [PH] (ref 5–8)
RBC #/AREA URNS AUTO: ABNORMAL /HPF
SP GR UR STRIP: <=1.005 (ref 1–1.03)
SQUAMOUS #/AREA URNS AUTO: ABNORMAL /LPF
UROBILINOGEN UR STRIP-ACNC: 0.2 E.U./DL
WBC #/AREA URNS AUTO: ABNORMAL /HPF

## 2023-08-01 PROCEDURE — 99214 OFFICE O/P EST MOD 30 MIN: CPT | Performed by: NURSE PRACTITIONER

## 2023-08-01 PROCEDURE — 99215 OFFICE O/P EST HI 40 MIN: CPT | Performed by: FAMILY MEDICINE

## 2023-08-01 PROCEDURE — 81001 URINALYSIS AUTO W/SCOPE: CPT | Performed by: FAMILY MEDICINE

## 2023-08-01 NOTE — PROGRESS NOTES
"  Assessment & Plan     Abnormal stool color  Ongoing x 2 months. Reviewed supplements/dietary aspect to modify further. GI referral; starting liver work up with labs. Had recent CT scan showing \"normal hepatobiliary\" findings.    - UA Macroscopic with reflex to Microscopic and Culture - Lab Collect; Future ((to assess for bilirubin))  - Hepatic panel (Albumin, ALT, AST, Bili, Alk Phos, TP); Future (with urology's BMP in 2 weeks)  - TSH reflex (hx subclinical hypothyroidism)  - Adult GI  Referral - Consult Only; Future    Diverticulitis of colon  Hx of 4-5 \"mini fares\" per pt with 1 course of abx needed; severe distal diverticulosis on recent CT scan reviewed; encouraged her to bring up at GI appt but also may need to consider eventual colorectal surgery and starting this referral as pt agrees; she understands high fiber diet; reviewed other dietary changes not necessarily required with current data however she finds this eases her anxiety  - Adult GI  Referral - Consult Only; Future  - Adult Colorectal Surgery  Referral; Future    Kidney stone  Hypercalciuria  Followed by urology; reviewed recent 7/24/23 note and recent CT scan with plans. She is returning in ~ 2 weeks for BMP (hypokalemia) after making change to chlorthalidone to reduce to 12.5mg and start potassium. She requested a strainer for  her current stone if possible.   - Stone analysis; Future    S/P ablation of atrial fibrillation  Clinically asymptomatic; doing well. Has cardiology 1 yr visit today.     Ganglion cyst  Left dorsal foot; now growing in size approx 2.5cm; open to ortho referral   - Orthopedic  Referral; Future         42 minutes spent on the date of the encounter doing chart review, patient visit and documentation.  Reviewed urology note 7/24 and CT scan    Lashonda Arango MD  [facility] JENNIFER Harmon is a 57 year old, presenting for the following health " issues:  Gastrointestinal Problem (Had CT scan for kidney stones, results came back for severe diverticulitis, Fhx of stomach issues, color of stool is yellowish, started taking a collagen supplement and that is the only thing that is new. Very soft stool. Has been going on for closer to two months now)        8/1/2023     9:59 AM   Additional Questions   Roomed by Radha GONZALEZ LPN       History of Present Illness       Reason for visit:  Color of stool  Symptom onset:  Today        Updates:  She has a 2x3mm kidney stone on her ureter; followed by urology and may need procedure to remove. No pains currently. We reviewed the scan together    She has low potassium (on chlorthalidone for hypercalciurira per urology). She has a potassium tablets right now- on 1/2 tablet chlorthalidone. Hydrating well.   Eating avocado and banana daily    Recent CT scan also showed severe diverticulosis - she notices some random LLQ flares but only needed abx 1 time - using high fiber diet and staying away from seeds/chips/popcorn instead. She suspects about 4-5 flares though.     Her dad had severe diverticulitis and perforation/colon removal.     Abnormal stool color: her primary concern today.   Her stools are ranging from normal color to more yellow like Grey Poupon mustard color for about 2 months. Normal soft texture; no melena, hematochezia or BRBPR.   We have reviewed her diet which is not particularly triggering but may be playing a role in stool color.   Supplements:   She stopped her antacid OTC from Melaleuca when these stool color symptoms started - Calmicid, We reviewed by juanyt it may cause stools to be yellow in color due to chamomile, fennel, and jagdish root. No change in stool color after stopping this.    She had been taking a collagen supplement in the past few months - it's a deep pink powder color. Has not yet tried to stop this.      She added a small amount of creatine as well in the past few months.   Hasn't taken  it in 1-2 days now.    She doesn't drink much alcohol- wine 1-2x/month or less (5 glasses wine since Dec total)  No RUQ abdominal pain    Cologaurd recently from 7/15/2023  was negative    Obesity: She has intentionally lost 50lbs over the past year. Since she has had an afib ablation she has been able to work out more which hs been very optimistic   166lbs at home scale.   Started Dec 14th on her weight loss/health journey.   She is doing intermittent fasting and  cut out some foods in her diet due to the kidney stones- chocolate, sweet potato, red meat    She has low potassium (on chlorthalidone for hypercalciurira per urology). She has a potassium tablets right now- on 1/2 tablet chlorthalidone. Hydrating well.   Eating avocado and banana daily    Wt Readings from Last 3 Encounters:   08/01/23 78.3 kg (172 lb 11.2 oz)   04/17/23 85.5 kg (188 lb 9.6 oz)   09/30/22 98.4 kg (217 lb)             Review of Systems         Objective    /72 (BP Location: Left arm, Patient Position: Sitting, Cuff Size: Adult Regular)   Pulse 68   Wt 78.3 kg (172 lb 11.2 oz)   LMP 10/31/2018 (Approximate)   BMI 30.22 kg/m    Body mass index is 30.22 kg/m .  Physical Exam   GENERAL: healthy, alert and no distress  NECK: no adenopathy, no asymmetry, masses, or scars and thyroid normal to palpation  RESP: lungs clear to auscultation - no rales, rhonchi or wheezes  CV: regular rate and rhythm, normal S1 S2, no S3 or S4, no murmur, click or rub, no peripheral edema and peripheral pulses strong  ABDOMEN: soft, nontender, no hepatosplenomegaly, no masses and bowel sounds normal  MS: no gross musculoskeletal defects noted, no edema              EXAM: CT ABDOMEN PELVIS W/O CONTRAST   LOCATION: [facility]   DATE: 7/13/2023     INDICATION:  Calculus of kidney   COMPARISON: 08/30/2022 and older studies   TECHNIQUE: CT scan of the abdomen and pelvis was performed without IV contrast. Multiplanar reformats were  obtained. Dose reduction techniques were used.   CONTRAST: None.     FINDINGS:   LOWER CHEST: Normal.     HEPATOBILIARY: Normal.     PANCREAS: Normal.     SPLEEN: Normal.     ADRENAL GLANDS: Normal.     KIDNEYS/BLADDER: There are 3 punctate nonobstructing intrarenal calculi on the right. Previously noted 2 x  3 mm calculus in the right kidney has passed into the right ureter and is at the superior endplate of L3. No obstructive changes. No stone burden   on the left.     BOWEL: Extremely redundant colon with extensive distal colonic diverticuli. No inflammatory changes. Appendix is normal.     LYMPH NODES: Normal.     VASCULATURE: Unremarkable.     PELVIC ORGANS: Uterus is retroflexed.     MUSCULOSKELETAL: Advanced facet arthropathy L4-S1. No suspicious lesions. There is a small umbilical hernia containing only fat.   Impression:  IMPRESSION:   1.  The 2 x 3 mm right ureteral calculus is in transit and is in the proximal right ureter at the level of the superior endplate of L3. No obstructive changes.   2.  There are 3 other punctate nonobstructing intrarenal calculi on the right.   3.  No stone burden on the left.   4.  Extensive distal colonic diverticulosis.     [Access Center: See above report.   This report will be copied to the [facility] to ensure a provider acknowledges the finding. Access Center is available Monday through Friday 8am-3:30 pm.

## 2023-08-01 NOTE — PROGRESS NOTES
HEART CARE ELECTROPHYSIOLOGY NOTE      [facility]  [phone]      Assessment/Recommendations   Assessment/Plan:  Paroxysmal Atrial Fibrillation and typical appearing flutter: No symptomatology or evidence of AF recurrence.  She remains off membrane active antiarrhythmic medications.  She is to call for recurrent AF.    She has a [id number] score of 1 for female gender.  HAS-BLED score of 0.   Start aspirin 81 mg daily for stroke prophylaxis.    Follow-up as needed     History of Present Illness/Subjective    HPI: Tania Kaufman is a 57 year old female who comes in for EP follow up of atrial fibrillation and history and physical prior to pulmonary vein isolation ablation.  She has a history of atrial fibrillation, anxiety, diverticulitis, IBS, and recurrent kidney stones on chlorthalidone and potassium citrate.      She was diagnosed with atrial fibrillation in February 2020, though began having palpitations in the summer 2019.  Symptoms consist of tachypalpitations, dyspnea on exertion, and lightheadedness.  Episodes were occurring every 1 to 2 months, but increased in frequency and duration, occurring almost daily and lasting up to 6 hours.   Echo shows normal structure and function.  Event monitor documented atrial fibrillation with rapid ventricular response.   She was started on low-dose metoprolol succinate and Eliquis on 2/21/2020 with subsequent decrease in AF frequency and symptom severity, but with hypotension.  Metoprolol was switched to diltiazem.   She again had an increase in AF frequency.  Nuclear stress test was negative for ischemia.  She was started on low-dose flecainide, but continued to have frequent episodes of A. fib, so flecainide was ultimately increased to 150 mg twice daily.  She underwent pulmonary vein isolation ablation with Dr. Moreau on 6/29/2022 with RF WACA pulmonary vein isolation and right atrial CTI flutter line.  She had recurrent AF on 7/11/2022.   She was planned for cardioversion, but converted back to sinus rhythm spontaneously on 7/14/2022.  During this time, she was also treated in the ED for diverticulitis.  Eliquis was discontinued 3 months post ablation and she remains on low-dose aspirin for stroke prophylaxis.     Eden states that she is feeling well.  She has not had any AF.  She denies chest discomfort, palpitations, abdominal fullness/bloating or peripheral edema, shortness of breath, paroxysmal nocturnal dyspnea, orthopnea, lightheadedness, pre-syncope, or syncope.  She has been working hard at weight loss through diet and exercise.     Cardiographics (EKG personally reviewed):  EKG done 6/21/2022 shows sinus rhythm at 68 bpm, first-degree AV block,  ms, QT/QTc 436/463 ms  EKG done 10/6/2021 shows typical atrial flutter with variable block 94 bpm     Event monitor worn for 1 week beginning on 2/17/2020 shows paroxysmal atrial fibrillation with rapid ventricular response 105-160 bpm.  Symptoms noted for all episodes of A. fib.  AF burden approximately 6%.  No significant bradycardia or pauses.  No significant ventricular arrhythmia.     ECHO done 2/24/2020:  Left ventricle ejection fraction is normal. The calculated left ventricular ejection fraction is 58%.  The right ventricle is mildly dilated. The systolic function is mildly reduced.  No hemodynamically significant valvular heart abnormalities.  No previous study for comparison.     NM stress test done December 21, 2020:    There is no prior study for comparison.    The nuclear stress test is negative for inducible myocardial ischemia or infarction.    The patient is at a low risk of future cardiac ischemic events.    The left ventricular ejection fraction at rest is greater than 70%.    The patient's exercise capacity is average.  No chest pain with activity.    Left ventricular function is normal.    Stress electrocardiogram was negative for inducible ischemia.    MRA Pulmonary Veins  done 4/21/2021: EF 55%    I have reviewed and updated the patient's Past Medical History, Social History, Family History and Medication List.  Outside records personally reviewed.     Physical Examination  Review of Systems   Vitals: /70 (BP Location: Right arm, Patient Position: Sitting, Cuff Size: Adult Regular)   Pulse 68   Resp 16   Wt 78.9 kg (174 lb)   LMP 10/31/2018 (Approximate)   SpO2 98%   BMI 30.45 kg/m    BMI= Body mass index is 30.45 kg/m .  Wt Readings from Last 3 Encounters:   08/01/23 78.9 kg (174 lb)   08/01/23 78.3 kg (172 lb 11.2 oz)   04/17/23 85.5 kg (188 lb 9.6 oz)       General Appearance:   Alert, well-appearing and in no acute distress.   HEENT: Atraumatic, normocephalic.  No scleral icterus, normal conjunctivae, EOMs intact, PERRL.  Mucous membranes pink and moist.   Chest/Lungs:   Chest symmetric, spine straight.  Respirations unlabored.  Lungs are clear to auscultation.   Cardiovascular:   Regular rate and rhythm.  Normal first and second heart sounds with no murmurs, rubs, or gallops; radial and posterior tibial pulses are intact, No edema.   Abdomen:  Soft, nondistended, bowel sounds present.   Extremities: No cyanosis or clubbing.   Musculoskeletal: Moves all extremities.     Skin: Warm, dry, intact.    Neurologic: Mood and affect are appropriate.  Alert and oriented to person, place, time, and situation.     ROS: 10 point ROS neg other than the symptoms noted above in the HPI.         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     PONV (postoperative nausea and vomiting)      Past Surgical History:   Procedure  Laterality Date    EP ABLATION FOCAL AFIB N/A 6/29/2022    Procedure: EP Ablation Atrial Fibrilation;  Surgeon: Seven Moreau MD;  Location:  HEART CARDIAC CATH LAB    NO PAST SURGERIES      OTHER SURGICAL HISTORY      wisdom teeth removal    NM CYSTO/URETERO W/LITHOTRIPSY &INDWELL STENT INSRT Right 6/11/2019    Procedure: CYSTOSCOPY, RIGHT RETROGRADE,  RIGHT URETEROSCOPY WITH LASER LITHOTRIPSY AND STENT INSERTION;  Surgeon: John Gutierrez MD;  Location: [facility];  Service: Urology     Family History   Problem Relation Age of Onset    Cancer Mother         skin    Pacemaker Mother         4 open heart surgeries     Diverticulitis Father     Breast Cancer Other 40.00        great aunt    Urolithiasis Brother         recurrent    Gout No family hx of         Social History     Socioeconomic History    Marital status:      Spouse name: Not on file    Number of children: Not on file    Years of education: Not on file    Highest education level: Not on file   Occupational History    Not on file   Tobacco Use    Smoking status: Never    Smokeless tobacco: Never   Substance and Sexual Activity    Alcohol use: Yes     Comment: Alcoholic Drinks/day: social only    Drug use: No    Sexual activity: Not on file   Other Topics Concern    Not on file   Social History Narrative         2 children (senior high school 2023, daughter 21yo swims in college)    Working full time from home as a     Walking 5-8miles per day (1.5hr in AM and PM)    Lashonda Arango MD     Social Determinants of Health     Financial Resource Strain: Not on file   Food Insecurity: Not on file   Transportation Needs: Not on file   Physical Activity: Not on file   Stress: Not on file   Social Connections: Not on file   Intimate Partner Violence: Not on file   Housing Stability: Not on file           Medications  Allergies   Current Outpatient Medications   Medication Sig Dispense Refill    aspirin  (ASA) 81 MG EC tablet Take 1 tablet (81 mg) by mouth daily      chlorthalidone (HYGROTON) 25 MG tablet Take 0.5 tablets (12.5 mg) by mouth daily 90 tablet 1    clobetasol (TEMOVATE) 0.05 % external cream as needed      GLUCOSAMINE SULFATE (GLUCOSAMINE ORAL) Take 1 tablet by mouth daily       L.ACID/L.CASEI/B.BIF/B.LUKE/FOS (PROBIOTIC BLEND ORAL) Take 1 tablet by mouth 2 times daily as needed       melatonin 3 mg Tab tablet [MELATONIN 3 MG TAB TABLET] Take 3 mg by mouth bedtime as needed.      multivitamin therapeutic (THERAGRAN) tablet [MULTIVITAMIN THERAPEUTIC (THERAGRAN) TABLET] Take 1 tablet by mouth daily.      NON FORMULARY daily Vitamins - Melaleuca      potassium citrate 15 MEQ (1620 MG) TBCR Take 15 mEq by mouth 2 times daily (with meals) 180 tablet 1    silodosin (RAPAFLO) 4 MG CAPS capsule Take 1 capsule (4 mg) by mouth daily 30 capsule 0     No Known Allergies     Severe nausea and vomiting with anesthesia      Lab Results    Chemistry/lipid CBC Cardiac Enzymes/BNP/TSH/INR   Recent Labs   Lab Test 09/11/14  0821   CHOL 178   HDL 75   LDL 87   TRIG 82     Recent Labs   Lab Test 09/11/14  0821   LDL 87     Recent Labs   Lab Test 04/17/23  1559 07/12/22  0326    141   POTASSIUM 3.0* 4.0   CHLORIDE 97* 109*   CO2 27 23   ANIONGAP 15 9   GLC 99 112   BUN 13.3 9   CR 0.76 0.75   INDIA 10.1* 9.3        CBC RESULTS:   Recent Labs   Lab Test 04/17/23  1559 07/12/22  0326   WBC  --  9.6   RBC  --  4.30   HGB 13.8 12.5   HCT  --  38.4   MCV  --  89   MCH  --  29.1   MCHC  --  32.6   RDW  --  13.1   PLT  --  278        Lab Results   Component Value Date    TSH 3.73 04/17/2023    TSH 5.94 07/12/2022

## 2023-08-01 NOTE — PATIENT INSTRUCTIONS
Tania Kaufman,    It was a pleasure to see you today at the [facility].     My recommendations after this visit include:    Continue aspirin 81 mg daily    Follow up if you have recurrence of AF    Florecita Cox CNP  [facility], Electrophysiology  [phone]  EP nurses [phone]

## 2023-08-01 NOTE — LETTER
8/1/2023    Lashonda Arango MD  [address] [facility] [address]    RE: Tania Kaufman       Dear Colleague,     I had the pleasure of seeing [facility] in the [facility].    HEART CARE ELECTROPHYSIOLOGY NOTE      [facility]  [phone]      Assessment/Recommendations   Assessment/Plan:  Paroxysmal Atrial Fibrillation and typical appearing flutter: No symptomatology or evidence of AF recurrence.  She remains off membrane active antiarrhythmic medications.  She is to call for recurrent AF.    She has a [id number] score of 1 for female gender.  HAS-BLED score of 0.   Start aspirin 81 mg daily for stroke prophylaxis.    Follow-up as needed     History of Present Illness/Subjective    HPI: Tania Kaufman is a 57 year old female who comes in for EP follow up of atrial fibrillation and history and physical prior to pulmonary vein isolation ablation.  She has a history of atrial fibrillation, anxiety, diverticulitis, IBS, and recurrent kidney stones on chlorthalidone and potassium citrate.      She was diagnosed with atrial fibrillation in February 2020, though began having palpitations in the summer 2019.  Symptoms consist of tachypalpitations, dyspnea on exertion, and lightheadedness.  Episodes were occurring every 1 to 2 months, but increased in frequency and duration, occurring almost daily and lasting up to 6 hours.   Echo shows normal structure and function.  Event monitor documented atrial fibrillation with rapid ventricular response.   She was started on low-dose metoprolol succinate and Eliquis on 2/21/2020 with subsequent decrease in AF frequency and symptom severity, but with hypotension.  Metoprolol was switched to diltiazem.   She again had an increase in AF frequency.  Nuclear stress test was negative for ischemia.  She was started on low-dose flecainide, but continued to have frequent episodes of A. fib, so flecainide was ultimately increased  to 150 mg twice daily.  She underwent pulmonary vein isolation ablation with Dr. Moreau on 6/29/2022 with RF WACA pulmonary vein isolation and right atrial CTI flutter line.  She had recurrent AF on 7/11/2022.  She was planned for cardioversion, but converted back to sinus rhythm spontaneously on 7/14/2022.  During this time, she was also treated in the ED for diverticulitis.  Eliquis was discontinued 3 months post ablation and she remains on low-dose aspirin for stroke prophylaxis.     Eden states that she is feeling well.  She has not had any AF.  She denies chest discomfort, palpitations, abdominal fullness/bloating or peripheral edema, shortness of breath, paroxysmal nocturnal dyspnea, orthopnea, lightheadedness, pre-syncope, or syncope.  She has been working hard at weight loss through diet and exercise.     Cardiographics (EKG personally reviewed):  EKG done 6/21/2022 shows sinus rhythm at 68 bpm, first-degree AV block,  ms, QT/QTc 436/463 ms  EKG done 10/6/2021 shows typical atrial flutter with variable block 94 bpm     Event monitor worn for 1 week beginning on 2/17/2020 shows paroxysmal atrial fibrillation with rapid ventricular response 105-160 bpm.  Symptoms noted for all episodes of A. fib.  AF burden approximately 6%.  No significant bradycardia or pauses.  No significant ventricular arrhythmia.     ECHO done 2/24/2020:  Left ventricle ejection fraction is normal. The calculated left ventricular ejection fraction is 58%.  The right ventricle is mildly dilated. The systolic function is mildly reduced.  No hemodynamically significant valvular heart abnormalities.  No previous study for comparison.     NM stress test done December 21, 2020:    There is no prior study for comparison.    The nuclear stress test is negative for inducible myocardial ischemia or infarction.    The patient is at a low risk of future cardiac ischemic events.    The left ventricular ejection fraction at rest is greater than  70%.    The patient's exercise capacity is average.  No chest pain with activity.    Left ventricular function is normal.    Stress electrocardiogram was negative for inducible ischemia.    MRA Pulmonary Veins done 4/21/2021: EF 55%    I have reviewed and updated the patient's Past Medical History, Social History, Family History and Medication List.  Outside records personally reviewed.     Physical Examination  Review of Systems   Vitals: /70 (BP Location: Right arm, Patient Position: Sitting, Cuff Size: Adult Regular)   Pulse 68   Resp 16   Wt 78.9 kg (174 lb)   LMP 10/31/2018 (Approximate)   SpO2 98%   BMI 30.45 kg/m    BMI= Body mass index is 30.45 kg/m .  Wt Readings from Last 3 Encounters:   08/01/23 78.9 kg (174 lb)   08/01/23 78.3 kg (172 lb 11.2 oz)   04/17/23 85.5 kg (188 lb 9.6 oz)       General Appearance:   Alert, well-appearing and in no acute distress.   HEENT: Atraumatic, normocephalic.  No scleral icterus, normal conjunctivae, EOMs intact, PERRL.  Mucous membranes pink and moist.   Chest/Lungs:   Chest symmetric, spine straight.  Respirations unlabored.  Lungs are clear to auscultation.   Cardiovascular:   Regular rate and rhythm.  Normal first and second heart sounds with no murmurs, rubs, or gallops; radial and posterior tibial pulses are intact, No edema.   Abdomen:  Soft, nondistended, bowel sounds present.   Extremities: No cyanosis or clubbing.   Musculoskeletal: Moves all extremities.     Skin: Warm, dry, intact.    Neurologic: Mood and affect are appropriate.  Alert and oriented to person, place, time, and situation.     ROS: 10 point ROS neg other than the symptoms noted above in the HPI.         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     PONV (postoperative nausea and vomiting)      Past Surgical History:   Procedure Laterality Date    EP ABLATION FOCAL AFIB N/A 6/29/2022    Procedure: EP Ablation Atrial Fibrilation;  Surgeon: Seven Moreau MD;  Location:  HEART CARDIAC CATH LAB    NO PAST SURGERIES      OTHER SURGICAL HISTORY      wisdom teeth removal    AL CYSTO/URETERO W/LITHOTRIPSY &INDWELL STENT INSRT Right 6/11/2019    Procedure: CYSTOSCOPY, RIGHT RETROGRADE,  RIGHT URETEROSCOPY WITH LASER LITHOTRIPSY AND STENT INSERTION;  Surgeon: John Gutierrez MD;  Location: [facility];  Service: Urology     Family History   Problem Relation Age of Onset    Cancer Mother         skin    Pacemaker Mother         4 open heart surgeries     Diverticulitis Father     Breast Cancer Other 40.00        great aunt    Urolithiasis Brother         recurrent    Gout No family hx of         Social History     Socioeconomic History    Marital status:      Spouse name: Not on file    Number of children: Not on file    Years of education: Not on file    Highest education level: Not on file   Occupational History    Not on file   Tobacco Use    Smoking status: Never    Smokeless tobacco: Never   Substance and Sexual Activity    Alcohol use: Yes     Comment: Alcoholic Drinks/day: social only    Drug use: No    Sexual activity: Not on file   Other Topics Concern    Not on file   Social History Narrative         2 children (senior high school 2023, daughter 19yo swims in college)    Working full time from home as a     Walking 5-8miles per day (1.5hr in AM and PM)    Lashonda Arango MD     Social Determinants of Health     Financial Resource Strain: Not on file   Food Insecurity: Not on file   Transportation Needs: Not on file   Physical Activity: Not on file   Stress: Not on file   Social  Connections: Not on file   Intimate Partner Violence: Not on file   Housing Stability: Not on file           Medications  Allergies   Current Outpatient Medications   Medication Sig Dispense Refill    aspirin (ASA) 81 MG EC tablet Take 1 tablet (81 mg) by mouth daily      chlorthalidone (HYGROTON) 25 MG tablet Take 0.5 tablets (12.5 mg) by mouth daily 90 tablet 1    clobetasol (TEMOVATE) 0.05 % external cream as needed      GLUCOSAMINE SULFATE (GLUCOSAMINE ORAL) Take 1 tablet by mouth daily       L.ACID/L.CASEI/B.BIF/B.LUKE/FOS (PROBIOTIC BLEND ORAL) Take 1 tablet by mouth 2 times daily as needed       melatonin 3 mg Tab tablet [MELATONIN 3 MG TAB TABLET] Take 3 mg by mouth bedtime as needed.      multivitamin therapeutic (THERAGRAN) tablet [MULTIVITAMIN THERAPEUTIC (THERAGRAN) TABLET] Take 1 tablet by mouth daily.      NON FORMULARY daily Vitamins - Melaleuca      potassium citrate 15 MEQ (1620 MG) TBCR Take 15 mEq by mouth 2 times daily (with meals) 180 tablet 1    silodosin (RAPAFLO) 4 MG CAPS capsule Take 1 capsule (4 mg) by mouth daily 30 capsule 0     No Known Allergies     Severe nausea and vomiting with anesthesia      Lab Results    Chemistry/lipid CBC Cardiac Enzymes/BNP/TSH/INR   Recent Labs   Lab Test 09/11/14  0821   CHOL 178   HDL 75   LDL 87   TRIG 82     Recent Labs   Lab Test 09/11/14  0821   LDL 87     Recent Labs   Lab Test 04/17/23  1559 07/12/22  0326    141   POTASSIUM 3.0* 4.0   CHLORIDE 97* 109*   CO2 27 23   ANIONGAP 15 9   GLC 99 112   BUN 13.3 9   CR 0.76 0.75   INDIA 10.1* 9.3        CBC RESULTS:   Recent Labs   Lab Test 04/17/23  1559 07/12/22  0326   WBC  --  9.6   RBC  --  4.30   HGB 13.8 12.5   HCT  --  38.4   MCV  --  89   MCH  --  29.1   MCHC  --  32.6   RDW  --  13.1   PLT  --  278        Lab Results   Component Value Date    TSH 3.73 04/17/2023    TSH 5.94 07/12/2022                        Thank you for allowing me to participate in the care of your patient.      Sincerely,      TAMIA Presley CNP     [facility]  cc:   TAMIA Presley CNP  [address], [address]  [place] [address]

## 2023-08-18 ENCOUNTER — MYC MEDICAL ADVICE (OUTPATIENT)
Dept: FAMILY MEDICINE | Facility: CLINIC | Age: 58
End: 2023-08-18
Payer: COMMERCIAL

## 2023-08-18 NOTE — TELEPHONE ENCOUNTER
[phone]. Colorectal surgery   [phone]. GI referral - [facility].

## 2023-08-22 DIAGNOSIS — N20.1 RIGHT URETERAL STONE: ICD-10-CM

## 2023-08-22 RX ORDER — SILODOSIN 4 MG/1
4 CAPSULE ORAL DAILY
Qty: 30 CAPSULE | Refills: 0 | Status: SHIPPED | OUTPATIENT
Start: 2023-08-22 | End: 2023-09-20

## 2023-09-20 ENCOUNTER — TELEPHONE (OUTPATIENT)
Dept: UROLOGY | Facility: CLINIC | Age: 58
End: 2023-09-20
Payer: COMMERCIAL

## 2023-09-20 DIAGNOSIS — N20.1 RIGHT URETERAL STONE: ICD-10-CM

## 2023-09-20 RX ORDER — SILODOSIN 4 MG/1
4 CAPSULE ORAL DAILY
Qty: 30 CAPSULE | Refills: 0 | Status: SHIPPED | OUTPATIENT
Start: 2023-09-20 | End: 2023-10-25

## 2023-09-20 NOTE — TELEPHONE ENCOUNTER
Request faxed to [email or web address] to send patient 1 24hour testing kit.  Also left message  for patient to remind her of lab testing needed and follow up visit after [facility] is complete.  Shannon Campos RN

## 2023-10-22 DIAGNOSIS — N20.1 RIGHT URETERAL STONE: ICD-10-CM

## 2023-10-24 ENCOUNTER — TELEPHONE (OUTPATIENT)
Dept: UROLOGY | Facility: CLINIC | Age: 58
End: 2023-10-24
Payer: COMMERCIAL

## 2023-10-24 RX ORDER — SILODOSIN 4 MG/1
4 CAPSULE ORAL DAILY
Qty: 30 CAPSULE | Refills: 0 | OUTPATIENT
Start: 2023-10-24

## 2023-10-25 DIAGNOSIS — N20.1 RIGHT URETERAL STONE: ICD-10-CM

## 2023-10-25 DIAGNOSIS — N20.1 RIGHT URETERAL STONE: Primary | ICD-10-CM

## 2023-10-25 RX ORDER — SILODOSIN 4 MG/1
4 CAPSULE ORAL DAILY
Qty: 30 CAPSULE | Refills: 0 | Status: SHIPPED | OUTPATIENT
Start: 2023-10-25 | End: 2023-11-22

## 2023-11-22 DIAGNOSIS — N20.1 RIGHT URETERAL STONE: ICD-10-CM

## 2023-11-22 RX ORDER — SILODOSIN 4 MG/1
4 CAPSULE ORAL DAILY
Qty: 30 CAPSULE | Refills: 0 | Status: SHIPPED | OUTPATIENT
Start: 2023-11-22 | End: 2023-12-26

## 2023-11-24 ENCOUNTER — MYC REFILL (OUTPATIENT)
Dept: UROLOGY | Facility: CLINIC | Age: 58
End: 2023-11-24
Payer: COMMERCIAL

## 2023-11-24 DIAGNOSIS — R82.994 HYPERCALCIURIA: ICD-10-CM

## 2023-11-24 RX ORDER — CHLORTHALIDONE 25 MG/1
12.5 TABLET ORAL DAILY
Qty: 90 TABLET | Refills: 1 | Status: SHIPPED | OUTPATIENT
Start: 2023-11-24 | End: 2024-08-23

## 2023-11-30 ENCOUNTER — LAB (OUTPATIENT)
Dept: LAB | Facility: CLINIC | Age: 58
End: 2023-11-30
Attending: NURSE PRACTITIONER
Payer: COMMERCIAL

## 2023-11-30 ENCOUNTER — HOSPITAL ENCOUNTER (OUTPATIENT)
Dept: CT IMAGING | Facility: CLINIC | Age: 58
Discharge: HOME | End: 2023-11-30
Attending: NURSE PRACTITIONER
Payer: COMMERCIAL

## 2023-11-30 DIAGNOSIS — E87.6 HYPOKALEMIA: ICD-10-CM

## 2023-11-30 DIAGNOSIS — R19.5 ABNORMAL STOOL COLOR: ICD-10-CM

## 2023-11-30 DIAGNOSIS — N20.1 RIGHT URETERAL STONE: ICD-10-CM

## 2023-11-30 DIAGNOSIS — E03.8 SUBCLINICAL HYPOTHYROIDISM: ICD-10-CM

## 2023-11-30 LAB
ALBUMIN SERPL BCG-MCNC: 4.3 G/DL (ref 3.5–5.2)
ALP SERPL-CCNC: 115 U/L (ref 40–150)
ALT SERPL W P-5'-P-CCNC: 24 U/L (ref 0–50)
ANION GAP SERPL CALCULATED.3IONS-SCNC: 8 MMOL/L (ref 7–15)
AST SERPL W P-5'-P-CCNC: 26 U/L (ref 0–45)
BILIRUB DIRECT SERPL-MCNC: <0.2 MG/DL (ref 0–0.3)
BILIRUB SERPL-MCNC: 0.3 MG/DL
BUN SERPL-MCNC: 10.4 MG/DL (ref 6–20)
CALCIUM SERPL-MCNC: 9.7 MG/DL (ref 8.6–10)
CHLORIDE SERPL-SCNC: 102 MMOL/L (ref 98–107)
CREAT SERPL-MCNC: 0.74 MG/DL (ref 0.51–0.95)
DEPRECATED HCO3 PLAS-SCNC: 30 MMOL/L (ref 22–29)
EGFRCR SERPLBLD CKD-EPI 2021: >90 ML/MIN/1.73M2
GLUCOSE SERPL-MCNC: 94 MG/DL (ref 70–99)
POTASSIUM SERPL-SCNC: 3.2 MMOL/L (ref 3.4–5.3)
PROT SERPL-MCNC: 7.2 G/DL (ref 6.4–8.3)
SODIUM SERPL-SCNC: 140 MMOL/L (ref 135–145)
TSH SERPL DL<=0.005 MIU/L-ACNC: 3.78 UIU/ML (ref 0.3–4.2)

## 2023-11-30 PROCEDURE — 84443 ASSAY THYROID STIM HORMONE: CPT

## 2023-11-30 PROCEDURE — 74176 CT ABD & PELVIS W/O CONTRAST: CPT

## 2023-11-30 PROCEDURE — 36415 COLL VENOUS BLD VENIPUNCTURE: CPT

## 2023-11-30 PROCEDURE — 82248 BILIRUBIN DIRECT: CPT

## 2023-12-06 DIAGNOSIS — E87.6 HYPOKALEMIA: Primary | ICD-10-CM

## 2023-12-23 DIAGNOSIS — N20.1 RIGHT URETERAL STONE: ICD-10-CM

## 2023-12-26 ENCOUNTER — MYC REFILL (OUTPATIENT)
Dept: UROLOGY | Facility: CLINIC | Age: 58
End: 2023-12-26
Payer: COMMERCIAL

## 2023-12-26 DIAGNOSIS — N20.1 RIGHT URETERAL STONE: ICD-10-CM

## 2023-12-26 RX ORDER — SILODOSIN 4 MG/1
4 CAPSULE ORAL DAILY
Qty: 30 CAPSULE | Refills: 0 | Status: CANCELLED | OUTPATIENT
Start: 2023-12-26

## 2023-12-26 RX ORDER — SILODOSIN 4 MG/1
4 CAPSULE ORAL DAILY
Qty: 30 CAPSULE | Refills: 0 | Status: SHIPPED | OUTPATIENT
Start: 2023-12-26 | End: 2024-01-24

## 2024-01-24 DIAGNOSIS — N20.1 RIGHT URETERAL STONE: ICD-10-CM

## 2024-01-24 RX ORDER — SILODOSIN 4 MG/1
4 CAPSULE ORAL DAILY
Qty: 30 CAPSULE | Refills: 0 | Status: SHIPPED | OUTPATIENT
Start: 2024-01-24 | End: 2024-02-26

## 2024-02-22 DIAGNOSIS — N20.1 RIGHT URETERAL STONE: ICD-10-CM

## 2024-02-26 RX ORDER — SILODOSIN 4 MG/1
4 CAPSULE ORAL DAILY
Qty: 30 CAPSULE | Refills: 0 | Status: SHIPPED | OUTPATIENT
Start: 2024-02-26 | End: 2024-04-19

## 2024-03-18 ENCOUNTER — PATIENT OUTREACH (OUTPATIENT)
Dept: CARE COORDINATION | Facility: CLINIC | Age: 59
End: 2024-03-18
Payer: COMMERCIAL

## 2024-04-01 ENCOUNTER — PATIENT OUTREACH (OUTPATIENT)
Dept: CARE COORDINATION | Facility: CLINIC | Age: 59
End: 2024-04-01
Payer: COMMERCIAL

## 2024-04-19 ENCOUNTER — HOSPITAL ENCOUNTER (EMERGENCY)
Facility: CLINIC | Age: 59
Discharge: HOME | End: 2024-04-20
Attending: EMERGENCY MEDICINE | Admitting: EMERGENCY MEDICINE
Payer: COMMERCIAL

## 2024-04-19 ENCOUNTER — MYC MEDICAL ADVICE (OUTPATIENT)
Dept: FAMILY MEDICINE | Facility: CLINIC | Age: 59
End: 2024-04-19
Payer: COMMERCIAL

## 2024-04-19 ENCOUNTER — NURSE TRIAGE (OUTPATIENT)
Dept: NURSING | Facility: CLINIC | Age: 59
End: 2024-04-19

## 2024-04-19 ENCOUNTER — APPOINTMENT (OUTPATIENT)
Dept: CT IMAGING | Facility: CLINIC | Age: 59
End: 2024-04-19
Attending: EMERGENCY MEDICINE
Payer: COMMERCIAL

## 2024-04-19 VITALS
BODY MASS INDEX: 27.49 KG/M2 | HEART RATE: 77 BPM | HEIGHT: 65 IN | SYSTOLIC BLOOD PRESSURE: 130 MMHG | DIASTOLIC BLOOD PRESSURE: 60 MMHG | TEMPERATURE: 97.5 F | RESPIRATION RATE: 16 BRPM | WEIGHT: 165 LBS | OXYGEN SATURATION: 99 %

## 2024-04-19 DIAGNOSIS — R11.2 NAUSEA AND VOMITING, UNSPECIFIED VOMITING TYPE: ICD-10-CM

## 2024-04-19 DIAGNOSIS — E87.6 HYPOKALEMIA: ICD-10-CM

## 2024-04-19 DIAGNOSIS — N20.0 KIDNEY STONE: ICD-10-CM

## 2024-04-19 DIAGNOSIS — R10.30 LOWER ABDOMINAL PAIN: ICD-10-CM

## 2024-04-19 DIAGNOSIS — N20.1 URETEROLITHIASIS: ICD-10-CM

## 2024-04-19 DIAGNOSIS — K57.30 DIVERTICULOSIS OF LARGE INTESTINE WITHOUT HEMORRHAGE: ICD-10-CM

## 2024-04-19 LAB
ALBUMIN SERPL BCG-MCNC: 4.2 G/DL (ref 3.5–5.2)
ALBUMIN UR-MCNC: NEGATIVE MG/DL
ALP SERPL-CCNC: 104 U/L (ref 40–150)
ALT SERPL W P-5'-P-CCNC: 15 U/L (ref 0–50)
ANION GAP SERPL CALCULATED.3IONS-SCNC: 16 MMOL/L (ref 7–15)
APPEARANCE UR: CLEAR
AST SERPL W P-5'-P-CCNC: 17 U/L (ref 0–45)
BASOPHILS # BLD AUTO: 0 10E3/UL (ref 0–0.2)
BASOPHILS NFR BLD AUTO: 0 %
BILIRUB SERPL-MCNC: 0.6 MG/DL
BILIRUB UR QL STRIP: NEGATIVE
BUN SERPL-MCNC: 13.6 MG/DL (ref 6–20)
CALCIUM SERPL-MCNC: 9.4 MG/DL (ref 8.6–10)
CHLORIDE SERPL-SCNC: 95 MMOL/L (ref 98–107)
COLOR UR AUTO: COLORLESS
CREAT SERPL-MCNC: 0.61 MG/DL (ref 0.51–0.95)
DEPRECATED HCO3 PLAS-SCNC: 20 MMOL/L (ref 22–29)
EGFRCR SERPLBLD CKD-EPI 2021: >90 ML/MIN/1.73M2
EOSINOPHIL # BLD AUTO: 0 10E3/UL (ref 0–0.7)
EOSINOPHIL NFR BLD AUTO: 0 %
ERYTHROCYTE [DISTWIDTH] IN BLOOD BY AUTOMATED COUNT: 12.9 % (ref 10–15)
GLUCOSE SERPL-MCNC: 133 MG/DL (ref 70–99)
GLUCOSE UR STRIP-MCNC: NEGATIVE MG/DL
HCT VFR BLD AUTO: 37.5 % (ref 35–47)
HGB BLD-MCNC: 13.5 G/DL (ref 11.7–15.7)
HGB UR QL STRIP: NEGATIVE
IMM GRANULOCYTES # BLD: 0 10E3/UL
IMM GRANULOCYTES NFR BLD: 0 %
KETONES UR STRIP-MCNC: NEGATIVE MG/DL
LEUKOCYTE ESTERASE UR QL STRIP: NEGATIVE
LIPASE SERPL-CCNC: 35 U/L (ref 13–60)
LYMPHOCYTES # BLD AUTO: 1.5 10E3/UL (ref 0.8–5.3)
LYMPHOCYTES NFR BLD AUTO: 14 %
MCH RBC QN AUTO: 29.9 PG (ref 26.5–33)
MCHC RBC AUTO-ENTMCNC: 36 G/DL (ref 31.5–36.5)
MCV RBC AUTO: 83 FL (ref 78–100)
MONOCYTES # BLD AUTO: 0.5 10E3/UL (ref 0–1.3)
MONOCYTES NFR BLD AUTO: 5 %
MUCOUS THREADS #/AREA URNS LPF: PRESENT /LPF
NEUTROPHILS # BLD AUTO: 8.5 10E3/UL (ref 1.6–8.3)
NEUTROPHILS NFR BLD AUTO: 80 %
NITRATE UR QL: NEGATIVE
NRBC # BLD AUTO: 0 10E3/UL
NRBC BLD AUTO-RTO: 0 /100
PH UR STRIP: 7 [PH] (ref 5–7)
PLATELET # BLD AUTO: 230 10E3/UL (ref 150–450)
POTASSIUM SERPL-SCNC: 2.5 MMOL/L (ref 3.4–5.3)
PROT SERPL-MCNC: 6.7 G/DL (ref 6.4–8.3)
RBC # BLD AUTO: 4.52 10E6/UL (ref 3.8–5.2)
RBC URINE: 1 /HPF
SODIUM SERPL-SCNC: 131 MMOL/L (ref 135–145)
SP GR UR STRIP: <1.005 (ref 1–1.03)
SQUAMOUS EPITHELIAL: <1 /HPF
UROBILINOGEN UR STRIP-MCNC: <2 MG/DL
WBC # BLD AUTO: 10.6 10E3/UL (ref 4–11)
WBC URINE: 1 /HPF

## 2024-04-19 PROCEDURE — 99285 EMERGENCY DEPT VISIT HI MDM: CPT | Mod: 25

## 2024-04-19 PROCEDURE — 250N000011 HC RX IP 250 OP 636: Performed by: EMERGENCY MEDICINE

## 2024-04-19 PROCEDURE — 258N000003 HC RX IP 258 OP 636: Performed by: EMERGENCY MEDICINE

## 2024-04-19 PROCEDURE — 36415 COLL VENOUS BLD VENIPUNCTURE: CPT | Performed by: EMERGENCY MEDICINE

## 2024-04-19 PROCEDURE — 96361 HYDRATE IV INFUSION ADD-ON: CPT

## 2024-04-19 PROCEDURE — 250N000013 HC RX MED GY IP 250 OP 250 PS 637: Performed by: EMERGENCY MEDICINE

## 2024-04-19 PROCEDURE — 80053 COMPREHEN METABOLIC PANEL: CPT | Performed by: EMERGENCY MEDICINE

## 2024-04-19 PROCEDURE — 96375 TX/PRO/DX INJ NEW DRUG ADDON: CPT

## 2024-04-19 PROCEDURE — 96365 THER/PROPH/DIAG IV INF INIT: CPT | Mod: 59

## 2024-04-19 PROCEDURE — 85041 AUTOMATED RBC COUNT: CPT | Performed by: EMERGENCY MEDICINE

## 2024-04-19 PROCEDURE — 74177 CT ABD & PELVIS W/CONTRAST: CPT

## 2024-04-19 PROCEDURE — 83690 ASSAY OF LIPASE: CPT | Performed by: EMERGENCY MEDICINE

## 2024-04-19 PROCEDURE — 81001 URINALYSIS AUTO W/SCOPE: CPT | Performed by: EMERGENCY MEDICINE

## 2024-04-19 RX ORDER — ONDANSETRON 4 MG/1
4 TABLET, ORALLY DISINTEGRATING ORAL EVERY 8 HOURS PRN
Qty: 12 TABLET | Refills: 0 | Status: SHIPPED | OUTPATIENT
Start: 2024-04-19 | End: 2024-05-16

## 2024-04-19 RX ORDER — DIPHENHYDRAMINE HYDROCHLORIDE 50 MG/ML
25 INJECTION INTRAMUSCULAR; INTRAVENOUS ONCE
Status: COMPLETED | OUTPATIENT
Start: 2024-04-19 | End: 2024-04-19

## 2024-04-19 RX ORDER — ACETAMINOPHEN 325 MG/1
975 TABLET ORAL ONCE
Status: COMPLETED | OUTPATIENT
Start: 2024-04-19 | End: 2024-04-19

## 2024-04-19 RX ORDER — OXYCODONE HYDROCHLORIDE 5 MG/1
5 TABLET ORAL EVERY 4 HOURS PRN
Qty: 12 TABLET | Refills: 0 | Status: SHIPPED | OUTPATIENT
Start: 2024-04-19 | End: 2024-04-23

## 2024-04-19 RX ORDER — ACETAMINOPHEN 500 MG
1000 TABLET ORAL EVERY 6 HOURS
Qty: 56 TABLET | Refills: 0 | Status: SHIPPED | OUTPATIENT
Start: 2024-04-19 | End: 2024-04-26

## 2024-04-19 RX ORDER — IOPAMIDOL 755 MG/ML
90 INJECTION, SOLUTION INTRAVASCULAR ONCE
Status: COMPLETED | OUTPATIENT
Start: 2024-04-19 | End: 2024-04-19

## 2024-04-19 RX ORDER — POTASSIUM CHLORIDE 7.45 MG/ML
10 INJECTION INTRAVENOUS ONCE
Status: COMPLETED | OUTPATIENT
Start: 2024-04-19 | End: 2024-04-19

## 2024-04-19 RX ORDER — DIMENHYDRINATE 50 MG
50 TABLET ORAL AT BEDTIME
Qty: 7 TABLET | Refills: 0 | Status: SHIPPED | OUTPATIENT
Start: 2024-04-19 | End: 2024-04-26

## 2024-04-19 RX ORDER — POTASSIUM CHLORIDE 1500 MG/1
40 TABLET, EXTENDED RELEASE ORAL ONCE
Status: COMPLETED | OUTPATIENT
Start: 2024-04-19 | End: 2024-04-19

## 2024-04-19 RX ORDER — IBUPROFEN 200 MG
400 TABLET ORAL EVERY 6 HOURS
Qty: 56 TABLET | Refills: 0 | Status: SHIPPED | OUTPATIENT
Start: 2024-04-19 | End: 2024-04-26

## 2024-04-19 RX ORDER — SODIUM CHLORIDE 9 MG/ML
INJECTION, SOLUTION INTRAVENOUS CONTINUOUS
Status: DISCONTINUED | OUTPATIENT
Start: 2024-04-19 | End: 2024-04-20 | Stop reason: HOSPADM

## 2024-04-19 RX ORDER — DIMENHYDRINATE 50 MG
50 TABLET ORAL EVERY 6 HOURS PRN
Qty: 28 TABLET | Refills: 0 | Status: SHIPPED | OUTPATIENT
Start: 2024-04-19 | End: 2024-05-16

## 2024-04-19 RX ADMIN — ACETAMINOPHEN 975 MG: 325 TABLET ORAL at 21:17

## 2024-04-19 RX ADMIN — POTASSIUM CHLORIDE 10 MEQ: 7.46 INJECTION, SOLUTION INTRAVENOUS at 22:07

## 2024-04-19 RX ADMIN — DIPHENHYDRAMINE HYDROCHLORIDE 25 MG: 50 INJECTION INTRAMUSCULAR; INTRAVENOUS at 21:17

## 2024-04-19 RX ADMIN — IOPAMIDOL 90 ML: 755 INJECTION, SOLUTION INTRAVENOUS at 21:57

## 2024-04-19 RX ADMIN — SODIUM CHLORIDE: 9 INJECTION, SOLUTION INTRAVENOUS at 22:09

## 2024-04-19 RX ADMIN — PROCHLORPERAZINE EDISYLATE 10 MG: 5 INJECTION INTRAMUSCULAR; INTRAVENOUS at 21:17

## 2024-04-19 RX ADMIN — POTASSIUM CHLORIDE 40 MEQ: 1500 TABLET, EXTENDED RELEASE ORAL at 22:44

## 2024-04-19 RX ADMIN — SODIUM CHLORIDE 1000 ML: 9 INJECTION, SOLUTION INTRAVENOUS at 21:16

## 2024-04-19 RX ADMIN — Medication 50 MG: at 23:25

## 2024-04-19 ASSESSMENT — ENCOUNTER SYMPTOMS
ABDOMINAL PAIN: 1
DYSURIA: 0
FEVER: 0
VOMITING: 1
NAUSEA: 1
CHILLS: 1
FLANK PAIN: 0
FREQUENCY: 0

## 2024-04-19 ASSESSMENT — COLUMBIA-SUICIDE SEVERITY RATING SCALE - C-SSRS
1. IN THE PAST MONTH, HAVE YOU WISHED YOU WERE DEAD OR WISHED YOU COULD GO TO SLEEP AND NOT WAKE UP?: NO
2. HAVE YOU ACTUALLY HAD ANY THOUGHTS OF KILLING YOURSELF IN THE PAST MONTH?: NO
6. HAVE YOU EVER DONE ANYTHING, STARTED TO DO ANYTHING, OR PREPARED TO DO ANYTHING TO END YOUR LIFE?: NO

## 2024-04-19 ASSESSMENT — ACTIVITIES OF DAILY LIVING (ADL)
ADLS_ACUITY_SCORE: 35

## 2024-04-19 NOTE — TELEPHONE ENCOUNTER
"  Nurse Triage SBAR    Is this a 2nd Level Triage? YES, LICENSED PRACTITIONER REVIEW IS REQUIRED    Situation: Nausea and vomiting.    Background: Patient reports she has been feeling \"severe nausea\" and vomiting since around 9 pm last night. She states she had botox yesterday at 1 pm, but states she has had that done before and never had an issue.    Assessment: Reports she has vomited x 5 since around 9 pm last night, and has had diarrhea x 5. Reports severe nausea. She was able to eat some applesauce and drink water and kept those down. She states she has only voided once today. Reports abdominal pain rated 4/10, that comes and goes. States it \"feels like diverticulitis\". Denies any fever. She also states she recently had kidney stones and believes she passed it.     Protocol Recommended Disposition:   Go To Office Now    Recommendation: Will route high priority to PCP and care team for follow up with patient. Reviewed care advice and call back instructions. Patient plans to follow advice.     Routed to provider    Does the patient meet one of the following criteria for ADS visit consideration? 16+ years old, with an [facility] PCP     TIP  Providers, please consider if this condition is appropriate for management at one of our Acute and Diagnostic Services sites.     If patient is a good candidate, please use dotphrase <dot>triageresponse and select Refer to ADS to document.      Reason for Disposition   Vomiting contains bile (green color)    Additional Information   Negative: Shock suspected (e.g., cold/pale/clammy skin, too weak to stand, low BP, rapid pulse)   Negative: Difficult to awaken or acting confused (e.g., disoriented, slurred speech)   Negative: Sounds like a life-threatening emergency to the triager   Negative: Vomiting occurs only while coughing   Negative: Pregnant < 20 Weeks and nausea/vomiting began in early pregnancy (i.e., 4-8 weeks pregnant)   Negative: Chest pain   Negative: Headache is main " symptom   Negative: Vomiting red blood or black (coffee ground) material   Negative: Vomiting and hernia is more painful or swollen than usual   Negative: Recent head injury (within 3 days)   Negative: Recent abdominal injury (within 7 days)   Negative: Insulin-dependent diabetes and glucose > 240 mg/dL (13 mmol/L)   Negative: Severe pain in one eye   Negative: SEVERE vomiting (e.g., 6 or more times/day)  (Exception: Patient sounds well, is drinking liquids, does not sound dehydrated, and vomiting has lasted less than 24 hours.)   Negative: MODERATE vomiting (e.g., 3 - 5 times/day) and age > 60 years   Negative: Constant abdominal pain lasting > 2 hours   Negative: High-risk adult (e.g., brain tumor, V-P shunt, hernia)   Negative: Drinking very little and has signs of dehydration (e.g., no urine > 12 hours, very dry mouth, very lightheaded)   Negative: Patient sounds very sick or weak to the triager   Negative: Vomiting and abdomen looks much more swollen than usual    Protocols used: Vomiting-A-OH

## 2024-04-19 NOTE — Clinical Note
Tania Kaufman was seen and treated in our emergency department on 4/19/2024.  She may return to work on 04/22/2024.       If you have any questions or concerns, please don't hesitate to call.      Bj Pham MD

## 2024-04-20 ENCOUNTER — MYC MEDICAL ADVICE (OUTPATIENT)
Dept: UROLOGY | Facility: CLINIC | Age: 59
End: 2024-04-20
Payer: COMMERCIAL

## 2024-04-20 DIAGNOSIS — N20.1 RIGHT URETERAL STONE: ICD-10-CM

## 2024-04-20 DIAGNOSIS — N20.0 CALCULUS OF KIDNEY: Primary | ICD-10-CM

## 2024-04-20 NOTE — MEDICATION SCRIBE - ADMISSION MEDICATION HISTORY
Medication Scribe Admission Medication History    Admission medication history is complete. The information provided in this note is only as accurate as the sources available at the time of the update.    Information Source(s): Patient and CareEverywhere/SureScripts via in-person    Pertinent Information: Patient reports not using clobetasol or silodosin recently. Is using an electrolyte powder twice daily.     Changes made to PTA medication list:  Added:   Electrolyte potassium PO  Deleted:   Clobetasol  Potassium tablets  Silodosin 4 mg  Changed: None    Allergies reviewed with patient and updates made in EHR: yes    Medication History Completed By: Cj Elizabeth 4/19/2024 11:01 PM    PTA Med List   Medication Sig Last Dose    acetaminophen (TYLENOL) 500 MG tablet Take 2 tablets (1,000 mg) by mouth every 6 hours for 7 days     aspirin (ASA) 81 MG EC tablet Take 1 tablet (81 mg) by mouth daily 4/18/2024 at PM    chlorthalidone (HYGROTON) 25 MG tablet Take 0.5 tablets (12.5 mg) by mouth daily 4/18/2024 at PM    dimenhyDRINATE (DRAMAMINE) 50 MG tablet Take 1 tablet (50 mg) by mouth at bedtime for 7 days     dimenhyDRINATE (DRAMAMINE) 50 MG tablet Take 1 tablet (50 mg) by mouth every 6 hours as needed for other (kidney stone pain management)     GLUCOSAMINE SULFATE (GLUCOSAMINE ORAL) Take 1 tablet by mouth daily  Past Month    ibuprofen (ADVIL/MOTRIN) 200 MG tablet Take 2 tablets (400 mg) by mouth every 6 hours for 7 days     L.ACID/L.CASEI/B.BIF/B.LUKE/FOS (PROBIOTIC BLEND ORAL) Take 1 tablet by mouth 2 times daily as needed  4/18/2024 at PM    melatonin 3 mg Tab tablet [MELATONIN 3 MG TAB TABLET] Take 3 mg by mouth bedtime as needed. 4/18/2024 at PM    multivitamin therapeutic (THERAGRAN) tablet [MULTIVITAMIN THERAPEUTIC (THERAGRAN) TABLET] Take 1 tablet by mouth daily. 4/18/2024 at PM    NON FORMULARY 2 times daily Vitamins - Melaleuca (fiber-wise supplement) 4/18/2024 at PM    ondansetron (ZOFRAN ODT) 4 MG ODT  tab Take 1 tablet (4 mg) by mouth every 8 hours as needed for nausea     oxyCODONE (ROXICODONE) 5 MG tablet Take 1 tablet (5 mg) by mouth every 4 hours as needed for severe pain If pain is not improved with acetaminophen and ibuprofen.     POTASSIUM PO Take 2 Scoops by mouth 2 times daily Dr. Mcadams electrolyte powder: contains other electrolytes but has 1 g of K-citrate per scoop.

## 2024-04-20 NOTE — ED TRIAGE NOTES
"/67   Pulse 76   Temp 98.1  F (36.7  C) (Temporal)   Resp 18   Ht 1.651 m (5' 5\")   Wt 74.8 kg (165 lb)   LMP 10/31/2018 (Approximate)   SpO2 100%   BMI 27.46 kg/m      Pt here with N/V/D and lower mid abdominal pain x 24 hours. Hx of diverticulitis and renal stones.      Triage Assessment (Adult)       Row Name 04/19/24 2044          Triage Assessment    Airway WDL WDL        Respiratory WDL    Respiratory WDL WDL        Skin Circulation/Temperature WDL    Skin Circulation/Temperature WDL WDL        Cardiac WDL    Cardiac WDL WDL        Peripheral/Neurovascular WDL    Peripheral Neurovascular WDL WDL        Cognitive/Neuro/Behavioral WDL    Cognitive/Neuro/Behavioral WDL WDL                     "

## 2024-04-20 NOTE — ED NOTES
EMERGENCY DEPARTMENT SIGN OUT NOTE        ED COURSE AND MEDICAL DECISION MAKING  Patient was signed out to me by Dr Iram Crowell at 10:00 PM.  10:32 PM I rechecked and updated patient on results. Patient feels reassured after discussion and feels comfortable going home.   11:21 PM I rechecked patient. Patient got slightly nauseous and was given medications. Patient was given ice chips. Will plan to let her sit for some time before re-evaluation and likely discharge. We discussed the plan for discharge and the patient is agreeable. Reviewed supportive cares, symptomatic treatment, outpatient follow up, and reasons to return to the Emergency Department. Patient to be discharged by ED RN.     In brief, Tania Kaufman is a 58 year old female who initially presented for abdominal pain, nausea, vomiting, and diarrhea.     At time of sign out, disposition was pending CT Abdomen Pelvis.  Patient signed out pending CT.  CT scan returned showing 5 mm stone in the right ureter near the UVJ.  Patient likely passed this on her own and she has passed larger stones in the past.  There is no signs of hydronephrosis, urinalysis was obtained and was negative for infection.  Patient received IV fluids and IV dose of potassium.  I did also give her an oral dose of potassium given the low levels and patient comfortable without any signs of irregular rhythm from hypokalemia.  Patient's pain was better controlled and after discussion of results with patient she felt comfortable going home.  Nausea was better and after trial of some water along with the potassium she did have a slight return of nausea.  She was given some Dramamine which prescription was also written for and this did help with the nausea.  Patient continued oral intake and will plan for discharge home with prescriptions.    FINAL IMPRESSION    1. Ureterolithiasis    2. Kidney stone    3. Lower abdominal pain    4. Diverticulosis of large intestine without hemorrhage     5. Hypokalemia    6. Nausea and vomiting, unspecified vomiting type        ED MEDS  Medications   sodium chloride 0.9 % infusion (0 mLs Intravenous Stopped 4/19/24 2314)   sodium chloride 0.9% BOLUS 1,000 mL (0 mLs Intravenous Stopped 4/19/24 2314)   prochlorperazine (COMPAZINE) injection 10 mg (10 mg Intravenous $Given 4/19/24 2117)   diphenhydrAMINE (BENADRYL) injection 25 mg (25 mg Intravenous $Given 4/19/24 2117)   acetaminophen (TYLENOL) tablet 975 mg (975 mg Oral $Given 4/19/24 2117)   iopamidol (ISOVUE-370) solution 90 mL (90 mLs Intravenous $Given 4/19/24 2157)   potassium chloride 10 mEq in 100 mL sterile water infusion (0 mEq Intravenous Stopped 4/19/24 2314)   potassium chloride rom ER (KLOR-CON M20) CR tablet 40 mEq (40 mEq Oral $Given 4/19/24 2244)   dimenhyDRINATE chew tab 50 mg (50 mg Oral $Given 4/19/24 2325)       LAB  Labs Ordered and Resulted from Time of ED Arrival to Time of ED Departure   COMPREHENSIVE METABOLIC PANEL - Abnormal       Result Value    Sodium 131 (*)     Potassium 2.5 (*)     Carbon Dioxide (CO2) 20 (*)     Anion Gap 16 (*)     Urea Nitrogen 13.6      Creatinine 0.61      GFR Estimate >90      Calcium 9.4      Chloride 95 (*)     Glucose 133 (*)     Alkaline Phosphatase 104      AST 17      ALT 15      Protein Total 6.7      Albumin 4.2      Bilirubin Total 0.6     ROUTINE UA WITH MICROSCOPIC - Abnormal    Color Urine Colorless      Appearance Urine Clear      Glucose Urine Negative      Bilirubin Urine Negative      Ketones Urine Negative      Specific Gravity Urine <1.005      Blood Urine Negative      pH Urine 7.0      Protein Albumin Urine Negative      Urobilinogen Urine <2.0      Nitrite Urine Negative      Leukocyte Esterase Urine Negative      Mucus Urine Present (*)     RBC Urine 1      WBC Urine 1      Squamous Epithelials Urine <1     CBC WITH PLATELETS AND DIFFERENTIAL - Abnormal    WBC Count 10.6      RBC Count 4.52      Hemoglobin 13.5      Hematocrit 37.5       MCV 83      MCH 29.9      MCHC 36.0      RDW 12.9      Platelet Count 230      % Neutrophils 80      % Lymphocytes 14      % Monocytes 5      % Eosinophils 0      % Basophils 0      % Immature Granulocytes 0      NRBCs per 100 WBC 0      Absolute Neutrophils 8.5 (*)     Absolute Lymphocytes 1.5      Absolute Monocytes 0.5      Absolute Eosinophils 0.0      Absolute Basophils 0.0      Absolute Immature Granulocytes 0.0      Absolute NRBCs 0.0     LIPASE - Normal    Lipase 35             RADIOLOGY    CT Abdomen Pelvis w Contrast   Final Result   IMPRESSION:    1.  0.5 cm distal right ureteral calculus at the ureterovesicular junction. No evidence of significant obstruction due to the calculus.   2.  No other cause of acute pain identified in the abdomen or pelvis.   3.  Colonic diverticulosis without convincing evidence of diverticulitis.           DISCHARGE MEDS  New Prescriptions    ACETAMINOPHEN (TYLENOL) 500 MG TABLET    Take 2 tablets (1,000 mg) by mouth every 6 hours for 7 days    DIMENHYDRINATE (DRAMAMINE) 50 MG TABLET    Take 1 tablet (50 mg) by mouth at bedtime for 7 days    DIMENHYDRINATE (DRAMAMINE) 50 MG TABLET    Take 1 tablet (50 mg) by mouth every 6 hours as needed for other (kidney stone pain management)    IBUPROFEN (ADVIL/MOTRIN) 200 MG TABLET    Take 2 tablets (400 mg) by mouth every 6 hours for 7 days    ONDANSETRON (ZOFRAN ODT) 4 MG ODT TAB    Take 1 tablet (4 mg) by mouth every 8 hours as needed for nausea    OXYCODONE (ROXICODONE) 5 MG TABLET    Take 1 tablet (5 mg) by mouth every 4 hours as needed for severe pain If pain is not improved with acetaminophen and ibuprofen.     I, Denise Fuentes, am serving as a scribe to document services personally performed by Dr. Pham, based on my observations and the provider's statements to me.  I, Dr. Pham, attest that Denise Fuentes is acting in a scribe capacity, has observed my performance of the services and has documented them in accordance with my  direction.      Bj Pham MD  [facility] EMERGENCY ROOM  [address] [id number] [phone]       Bj Pham MD  04/19/24 9839

## 2024-04-20 NOTE — ED PROVIDER NOTES
"  Emergency Department Encounter     Evaluation Date & Time:   2024  8:33 PM    CHIEF COMPLAINT:  Abdominal Pain and Nausea, Vomiting, & Diarrhea      Triage Note:/67   Pulse 76   Temp 98.1  F (36.7  C) (Temporal)   Resp 18   Ht 1.651 m (5' 5\")   Wt 74.8 kg (165 lb)   LMP 10/31/2018 (Approximate)   SpO2 100%   BMI 27.46 kg/m      Pt here with N/V/D and lower mid abdominal pain x 24 hours. Hx of diverticulitis and renal stones.      Triage Assessment (Adult)       Row Name 24          Triage Assessment    Airway WDL WDL        Respiratory WDL    Respiratory WDL WDL        Skin Circulation/Temperature WDL    Skin Circulation/Temperature WDL WDL        Cardiac WDL    Cardiac WDL WDL        Peripheral/Neurovascular WDL    Peripheral Neurovascular WDL WDL        Cognitive/Neuro/Behavioral WDL    Cognitive/Neuro/Behavioral WDL WDL                         FINAL IMPRESSION:  No diagnosis found.    Impression and Plan     ED COURSE & MEDICAL DECISION MAKIN:02 PM I introduced myself to the patient, obtained patient history, performed a physical exam, and discussed plan for ED workup including potential diagnostic laboratory/imaging studies and interventions.  ED Course as of 24 She has had symptoms of a viral gastroenteritis with nausea vomiting and diarrhea but has a history of recurrent diverticulitis though she has self treated previously without antibiotics and notes that her abdominal pains will resolve with more holistic treatments.  This time, however, she has been throwing up for the past day and so would like to get additional treatment for it.  She has required antibiotics once before for the diverticulitis but never needed surgery for that.  However, she has required previous interventions for kidney stones and has passed them independently on her own she did feel that she passed a kidney stone on the right within the past few days as well.  " So, differential still includes kidney stone, UTI, appendicitis or diverticulitis.  I discussed doing imaging with her and she is agreeable to this.  She has already received 8 mg of Zofran and route and is still nauseous so we will give Compazine and Benadryl.  She is comfortable with that plan.  Declines anything at this time for significant pain but did offer acetaminophen which she took.   2129 Outpatient urology and cardiology notes this last year reviewed.  She sees Reinaldo Park with urology for kidney stones,    2222 Patient signed out to  to follow up on ct results.  Depending on results may need admission versus discharge home.  If considering discharge, with her low k will need to be sure she can take orals.         At the conclusion of the encounter I discussed the results of all the tests and the disposition. The questions were answered. The patient or family acknowledged understanding and was agreeable with the care plan.          0 minutes of critical care time        MEDICATIONS GIVEN IN THE EMERGENCY DEPARTMENT:  Medications   sodium chloride 0.9 % infusion ( Intravenous $New Bag 4/19/24 2209)   potassium chloride 10 mEq in 100 mL sterile water infusion (10 mEq Intravenous $New Bag 4/19/24 2207)   sodium chloride 0.9% BOLUS 1,000 mL (1,000 mLs Intravenous $New Bag 4/19/24 2116)   prochlorperazine (COMPAZINE) injection 10 mg (10 mg Intravenous $Given 4/19/24 2117)   diphenhydrAMINE (BENADRYL) injection 25 mg (25 mg Intravenous $Given 4/19/24 2117)   acetaminophen (TYLENOL) tablet 975 mg (975 mg Oral $Given 4/19/24 2117)   iopamidol (ISOVUE-370) solution 90 mL (90 mLs Intravenous $Given 4/19/24 2157)       NEW PRESCRIPTIONS STARTED AT TODAY'S ED VISIT:  New Prescriptions    No medications on file       HPI     The history is provided by the patient. No  was used.      Tania Kaufman is a 58 year old female with a pertinent history of IBS, diverticulitis, gallstones,  kidney stones, and paroxysmal Afib s/p ablation in 2022 who presents to this ED via EMS for evaluation of abdominal pain.    The patient reports she has had 3-4 days of central lower abdominal pain. The pain feels similar to when she has had diverticulitis in the past. Last night she began to have nausea and vomiting which persisted throughout the day today. She also reports feeling chilled, but she has not had any fevers.    Earlier in the week she had been experiencing right flank pain that felt like a kidney stone. The pain stopped on Wednesday (2 days ago) and she is no longer having any flank pain. She denies any dysuria or frequency.    REVIEW OF SYSTEMS:  Review of Systems   Constitutional:  Positive for chills. Negative for fever.   Gastrointestinal:  Positive for abdominal pain, nausea and vomiting.   Genitourinary:  Negative for dysuria, flank pain and frequency.     remainder of systems are all otherwise negative.        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[place]     PONV (postoperative nausea and vomiting)        Past Surgical History:   Procedure Laterality Date    EP ABLATION FOCAL AFIB N/A 6/29/2022    Procedure: EP Ablation Atrial Fibrilation;  Surgeon: Seven Moreau MD;  Location:  [facility]    NO PAST SURGERIES      OTHER SURGICAL HISTORY      wisdom teeth removal    [place] CYSTO/URETERO W/LITHOTRIPSY &INDWELL STENT INSRT Right 6/11/2019    Procedure: CYSTOSCOPY, RIGHT RETROGRADE,  RIGHT URETEROSCOPY WITH LASER LITHOTRIPSY AND STENT INSERTION;  Surgeon: John Gutierrez MD;  Location: [facility] OR;  Service: Urology       Family History   Problem Relation Age of Onset    Cancer Mother         skin    Pacemaker Mother        "  4 open heart surgeries     Diverticulitis Father     Breast Cancer Other 40.00        great aunt    Urolithiasis Brother         recurrent    Gout No family hx of        Social History     Tobacco Use    Smoking status: Never    Smokeless tobacco: Never   Substance Use Topics    Alcohol use: Yes     Comment: Alcoholic Drinks/day: social only    Drug use: No       aspirin (ASA) 81 MG EC tablet  chlorthalidone (HYGROTON) 25 MG tablet  clobetasol (TEMOVATE) 0.05 % external cream  GLUCOSAMINE SULFATE (GLUCOSAMINE ORAL)  L.ACID/L.CASEI/B.BIF/B.LUKE/FOS (PROBIOTIC BLEND ORAL)  melatonin 3 mg Tab tablet  multivitamin therapeutic (THERAGRAN) tablet  NON FORMULARY  potassium citrate 15 MEQ (1620 MG) TBCR  silodosin (RAPAFLO) 4 MG CAPS capsule        Physical Exam     First Vitals:  Patient Vitals for the past 24 hrs:   BP Temp Temp src Pulse Resp SpO2 Height Weight   04/19/24 2043 119/67 98.1  F (36.7  C) Temporal 76 18 100 % 1.651 m (5' 5\") 74.8 kg (165 lb)       PHYSICAL EXAM:   Constitutional:   Lying inclined in bed   HENT:  Normocephalic, posterior pharynx wnl, mucous membranes moist and moderately pink   Eyes:  PERRL, EOMI, Conjunctiva normal, No discharge, no scleral icterus.  Respiratory:  Breathing easily, lungs clear to auscultation  Cardiovascular:  RRR, nl s1s2 0 murmurs, rubs, or gallops.  Peripheral pulses dp, pt, and radial are wnl.  No peripheral edema   GI:  Bowel sounds normal, Soft, No tenderness, No flank tenderness, nondistended.  :No CVA tenderness.   Musculoskeletal:  Moves all extremities.  No erythematous or swollen major joints,   Integument:  Normal color.  Neurologic:  Alert & oriented x 3, Normal motor function, Normal sensory function, No focal deficits noted. Normal speech.  Psychiatric:  Affect normal, Judgment normal, Mood normal.     Results     LAB AND RADIOLOGY:  All pertinent labs reviewed and interpreted  Results for orders placed or performed during the hospital encounter of 04/19/24 "   Comprehensive metabolic panel     Status: Abnormal   Result Value Ref Range    Sodium 131 (L) 135 - 145 mmol/L    Potassium 2.5 (LL) 3.4 - 5.3 mmol/L    Carbon Dioxide (CO2) 20 (L) 22 - 29 mmol/L    Anion Gap 16 (H) 7 - 15 mmol/L    Urea Nitrogen 13.6 6.0 - 20.0 mg/dL    Creatinine 0.61 0.51 - 0.95 mg/dL    GFR Estimate >90 >60 mL/min/1.73m2    Calcium 9.4 8.6 - 10.0 mg/dL    Chloride 95 (L) 98 - 107 mmol/L    Glucose 133 (H) 70 - 99 mg/dL    Alkaline Phosphatase 104 40 - 150 U/L    AST 17 0 - 45 U/L    ALT 15 0 - 50 U/L    Protein Total 6.7 6.4 - 8.3 g/dL    Albumin 4.2 3.5 - 5.2 g/dL    Bilirubin Total 0.6 <=1.2 mg/dL   Lipase     Status: Normal   Result Value Ref Range    Lipase 35 13 - 60 U/L   CBC with platelets and differential     Status: Abnormal   Result Value Ref Range    WBC Count 10.6 4.0 - 11.0 10e3/uL    RBC Count 4.52 3.80 - 5.20 10e6/uL    Hemoglobin 13.5 11.7 - 15.7 g/dL    Hematocrit 37.5 35.0 - 47.0 %    MCV 83 78 - 100 fL    MCH 29.9 26.5 - 33.0 pg    MCHC 36.0 31.5 - 36.5 g/dL    RDW 12.9 10.0 - 15.0 %    Platelet Count 230 150 - 450 10e3/uL    % Neutrophils 80 %    % Lymphocytes 14 %    % Monocytes 5 %    % Eosinophils 0 %    % Basophils 0 %    % Immature Granulocytes 0 %    NRBCs per 100 WBC 0 <1 /100    Absolute Neutrophils 8.5 (H) 1.6 - 8.3 10e3/uL    Absolute Lymphocytes 1.5 0.8 - 5.3 10e3/uL    Absolute Monocytes 0.5 0.0 - 1.3 10e3/uL    Absolute Eosinophils 0.0 0.0 - 0.7 10e3/uL    Absolute Basophils 0.0 0.0 - 0.2 10e3/uL    Absolute Immature Granulocytes 0.0 <=0.4 10e3/uL    Absolute NRBCs 0.0 10e3/uL   CBC with platelets differential     Status: Abnormal    Narrative    The following orders were created for panel order CBC with platelets differential.  Procedure                               Abnormality         Status                     ---------                               -----------         ------                     CBC with platelets and d...[[id number]]  Abnormal             Final result                 Please view results for these tests on the individual orders.           [facility] Documentation     Medical Decision Making  Obtained supplemental history:Supplemental history obtained?: No  Reviewed external records: External records reviewed?: Documented in chart  Care impacted by chronic illness:N/A  Care significantly affected by social determinants of health:N/A  Did you consider but not order tests?: Work up considered but not performed and documented in chart, if applicable  Did you interpret images independently?: Independent interpretation of ECG and images noted in documentation, when applicable.  Consultation discussion with other provider:Did you involve another provider (consultant, , pharmacy, etc.)?: I discussed the care with another health care provider, see documentation for details.  Admission considered. Patient was signed out to the oncoming physician, disposition pending.         I, Stuart Dennis, am serving as a scribe to document services personally performed by Iram Crowell MD based on my observations and the provider's statements to me.  I, Iram Crowell MD, attest that Stuart Dennis is acting in a scribe capacity, has observed my performance of the services and has documented them in accordance with my direction.    Iram Crowell MD  Emergency Medicine  [facility] EMERGENCY ROOM     Iram Crowell MD  04/19/24 5702

## 2024-04-22 ENCOUNTER — TELEPHONE (OUTPATIENT)
Dept: FAMILY MEDICINE | Facility: CLINIC | Age: 59
End: 2024-04-22
Payer: COMMERCIAL

## 2024-04-22 RX ORDER — SILODOSIN 4 MG/1
4 CAPSULE ORAL DAILY
Qty: 30 CAPSULE | Refills: 0 | Status: SHIPPED | OUTPATIENT
Start: 2024-04-22 | End: 2024-05-06

## 2024-04-22 NOTE — TELEPHONE ENCOUNTER
Sent in refill for the Silodosin per pt mychart request  Kaur RN  Care Coordinator Urology  [phone]

## 2024-04-25 ENCOUNTER — OFFICE VISIT (OUTPATIENT)
Dept: FAMILY MEDICINE | Facility: CLINIC | Age: 59
End: 2024-04-25
Payer: COMMERCIAL

## 2024-04-25 VITALS
WEIGHT: 180.5 LBS | DIASTOLIC BLOOD PRESSURE: 62 MMHG | BODY MASS INDEX: 30.04 KG/M2 | HEART RATE: 72 BPM | OXYGEN SATURATION: 99 % | SYSTOLIC BLOOD PRESSURE: 102 MMHG

## 2024-04-25 DIAGNOSIS — N20.0 KIDNEY STONE: Primary | ICD-10-CM

## 2024-04-25 DIAGNOSIS — E87.6 HYPOKALEMIA: ICD-10-CM

## 2024-04-25 DIAGNOSIS — R82.994 HYPERCALCIURIA: ICD-10-CM

## 2024-04-25 PROBLEM — I48.0 PAROXYSMAL ATRIAL FIBRILLATION (H): Status: RESOLVED | Noted: 2021-03-01 | Resolved: 2024-04-25

## 2024-04-25 PROBLEM — E66.9 MILD OBESITY: Status: ACTIVE | Noted: 2024-04-25

## 2024-04-25 PROBLEM — L98.8 VASCULAR DISORDER OF SKIN: Status: RESOLVED | Noted: 2022-09-28 | Resolved: 2024-04-25

## 2024-04-25 PROCEDURE — G2211 COMPLEX E/M VISIT ADD ON: HCPCS | Performed by: FAMILY MEDICINE

## 2024-04-25 PROCEDURE — 36415 COLL VENOUS BLD VENIPUNCTURE: CPT | Performed by: FAMILY MEDICINE

## 2024-04-25 PROCEDURE — 99214 OFFICE O/P EST MOD 30 MIN: CPT | Performed by: FAMILY MEDICINE

## 2024-04-25 PROCEDURE — 80048 BASIC METABOLIC PNL TOTAL CA: CPT | Performed by: FAMILY MEDICINE

## 2024-04-25 NOTE — PROGRESS NOTES
"  Assessment & Plan     Kidney stone  Hypercalciuria  Recent ER visit for ureterolithiasis; the stone (previously noted in August) has continued to move further; now CT showing 5 mm stone in the right ureter near the UVJ. Patient likely passed this on her own and she has passed larger stones in the past.   -Has follow-up with urology 5/6  -Encouraged to restart silodosin  -Has been using Dramamine for nausea primarily at this point  -Continue chlorthalidone 12.5 mg  -Encouraged to strain urine to collect stone  - Basic metabolic panel  (Ca, Cl, CO2, Creat, Gluc, K, Na, BUN)    Hypokalemia  Potassium 2.5 on ER admission though she been vomiting and not eating well in the past 24 hours of that lab sample.  She does take a fairly complete potassium supplementation regimen so we will recheck today to see that she is back to her baseline normal potassium supplementation.  Historically low potassium had been secondary to the chlorthalidone necessary for her hypercalciuria treatment  - Basic metabolic panel  (Ca, Cl, CO2, Creat, Gluc, K, Na, BUN); Future  - Basic metabolic panel  (Ca, Cl, CO2, Creat, Gluc, K, Na, BUN)    History of atrial fibrillation status post ablation procedure  Doing well without medication            MED REC REQUIRED  Post Medication Reconciliation Status:  Discharge medications reconciled and changed, see notes/orders  BMI  Estimated body mass index is 30.04 kg/m  as calculated from the following:    Height as of 4/19/24: 1.651 m (5' 5\").    Weight as of this encounter: 81.9 kg (180 lb 8 oz).       Follow-up 3 months with me for physical          Subjective   Eden is a 58 year old, presenting for the following health issues:  Hospital F/U (ED visit 4/19 for Abdominal Pain and Nausea, Vomiting, & Diarrhea- pain does eb and flow with kidney stone. / /)        4/25/2024     1:43 PM   Additional Questions   Roomed by Radha GONZALEZ LPN     [facility]         Hospital Follow-up Visit:    Hospital/Nursing Home/IP " Rehab Facility: [facility]  Date of Admission: 4/19/2024  Date of Discharge: 4/20/2024  Reason(s) for Admission: Abdominal Pain and Nausea, Vomiting, & Diarrhea  Was the patient in the ICU or did the patient experience delirium during hospitalization?  No  Do you have any other stressors you would like to discuss with your provider? No    Problems taking medications regularly:  None  Medication changes since discharge: started dramamine, zofran and silodosin  Problems adhering to non-medication therapy:  None    Summary of hospitalization:  [facility] discharge summary reviewed  Diagnostic Tests/Treatments reviewed.  Follow up needed: none  Other Healthcare Providers Involved in Patient s Care:         None  Update since discharge: improved.     Recent ER visit for kidney stone: CT scan returned showing 5 mm stone in the right ureter near the UVJ. Patient likely passed this on her own and she has passed larger stones in the past.     Urology visit scheduled for Monday May 6  She is going to restart her silodosin 4mg soon; ready to  at pharmacy today    Continues the chlorthalidone as well for hypercalcemia but it's causing low potassium vs also ust had a TON of vomiting that day (2.5 in the ER); given IV K replacement and sent home on oral as well.   She would like to use an electrolyte mix instead as well (has been doing this consistently for several months; Dr Mcadams's electrolytes 2 scoops twice daily) and also got potassium gummies (1000mg in a 2 gummy serving)  Needs goal 4700mg potassium per urology      She has had kidney stones in the past and each time presents slightly differently for her, last was 3 years ago.     We reviewed her baseline nausea threshold is very low so she required a lot of anti-nausea meds    Chief Complaint   Patient presents with    Hospital F/U     ED visit 4/19 for Abdominal Pain and Nausea, Vomiting, & Diarrhea- pain does eb and flow  with kidney stone.             Plan of care communicated with patient                  Objective    /62 (BP Location: Left arm, Patient Position: Sitting, Cuff Size: Adult Regular)   Pulse 72   Wt 81.9 kg (180 lb 8 oz)   LMP 10/31/2018 (Approximate)   SpO2 99%   BMI 30.04 kg/m    Body mass index is 30.04 kg/m .  Physical Exam   General: Alert, no acute distress.   HEENT: normocephalic conjunctivae are clear. EOMI  Neck: supple   Lungs: Normal effort  Heart: regular rate  Back: nontender  Abdomen: soft, nontender to palpation  Skin: clear without rash or lesions  Neuro: alert, oriented  Psych: mood good, affect appropriate, good eye contact, insight and judgment intact, normal speech pattern.              Signed Electronically by: Lashonda Arango MD

## 2024-04-26 LAB
ANION GAP SERPL CALCULATED.3IONS-SCNC: 9 MMOL/L (ref 7–15)
BUN SERPL-MCNC: 12.1 MG/DL (ref 6–20)
CALCIUM SERPL-MCNC: 9.5 MG/DL (ref 8.6–10)
CHLORIDE SERPL-SCNC: 98 MMOL/L (ref 98–107)
CREAT SERPL-MCNC: 0.81 MG/DL (ref 0.51–0.95)
DEPRECATED HCO3 PLAS-SCNC: 30 MMOL/L (ref 22–29)
EGFRCR SERPLBLD CKD-EPI 2021: 85 ML/MIN/1.73M2
GLUCOSE SERPL-MCNC: 94 MG/DL (ref 70–99)
POTASSIUM SERPL-SCNC: 3.5 MMOL/L (ref 3.4–5.3)
SODIUM SERPL-SCNC: 137 MMOL/L (ref 135–145)

## 2024-05-06 ENCOUNTER — VIRTUAL VISIT (OUTPATIENT)
Dept: UROLOGY | Facility: CLINIC | Age: 59
End: 2024-05-06
Attending: EMERGENCY MEDICINE
Payer: COMMERCIAL

## 2024-05-06 DIAGNOSIS — N20.0 NEPHROLITHIASIS: ICD-10-CM

## 2024-05-06 DIAGNOSIS — N20.1 RIGHT URETERAL STONE: Primary | ICD-10-CM

## 2024-05-06 PROCEDURE — 99214 OFFICE O/P EST MOD 30 MIN: CPT | Mod: 95 | Performed by: NURSE PRACTITIONER

## 2024-05-06 RX ORDER — SILODOSIN 4 MG/1
4 CAPSULE ORAL DAILY
Qty: 30 CAPSULE | Refills: 0 | Status: SHIPPED | OUTPATIENT
Start: 2024-05-06 | End: 2024-06-20

## 2024-05-06 NOTE — PROGRESS NOTES
"Virtual Visit Details    Type of service:  Video Visit     Originating Location (pt. Location): {video visit patient location:730324::\"Home\"}  {PROVIDER LOCATION On-site should be selected for visits conducted from your clinic location or adjoining [facility] hospital, academic office, or other nearby [facility] building. Off-site should be selected for all other provider locations, including home:[id number]}  Distant Location (provider location):  {virtual location provider:865446}  Platform used for Video Visit: {Virtual Visit Platforms:636577::\"[employer]\"}  "

## 2024-05-06 NOTE — NURSING NOTE
Is the patient currently in the state of [place]? YES    Visit mode:VIDEO    If the visit is dropped, the patient can be reconnected by: VIDEO VISIT: Text to cell phone:   Telephone Information:   Mobile [phone]       Will anyone else be joining the visit? NO  (If patient encounters technical issues they should call [phone])    How would you like to obtain your AVS? MyChart    Are changes needed to the allergy or medication list? No, Pt stated no changes to allergies, and Pt stated no med changes    Are refills needed on medications prescribed by this physician? NO    Reason for visit: GINGER SNELL

## 2024-05-06 NOTE — PATIENT INSTRUCTIONS
UROLOGY CLINIC VISIT PATIENT INSTRUCTIONS    If you have any issues, questions or concerns in the meantime, do not hesitate to contact us at [facility] at [phone] or via [employer].     Marly Velez CNP  Department of Urology     Medicines to Control Your Kidney Stone Symptoms    Control Pain: First Line Treatment    Dramamine (Please use the drowsy version, nongeneric formulation)  Available over the counter  **This medicine will cause increased drowsiness. DON T DRIVE OR OPERATE MACHINERY FOR 6 HOURS**    How to take:   Take 50 mg at bedtime every night until the stone passes  In addition, take 50 mg every 6 hours as needed    What it does:  Decreases spasm of the ureter  Decreases recurrence of pain for next 24 hours  Decreases severe pain  Decreases nausea  Will help you sleep    Ibuprofen (Advil or Motrin)  Available over the counter  **Please do not take if advise to avoid NSAIDS, history of stomach ulcers/bleeding issues, blood thinners, or already on NSAIDS**    How to take:   Take 2 to 4 (200 mg) tablets every 6 hours for the first 48 hours. After that, use only as needed    What it does:  Decreases pain  Prevents spasm of the ureter    Acetaminophen (Tylenol)   Available over the counter    How to Take:  Take 2 (500 mg) tablets every 6 hours as needed. Do not exceed 8 tablets (4,000 mg total) in 24 hours    What it does:  Highly effective in controlling pain    Other medicines we may give you:    Tamsulosin (Flomax): Take 0.4 mg daily with food     What it does:   May decrease stone pain   May help stones pass faster   May make surgery more successful by improving access to stone   May decrease discomfort from ureteral stent, if used    Possible side effects:   Lightheadedness when standing too quickly (especially in older people)   Stuffy nose

## 2024-05-06 NOTE — PROGRESS NOTES
Urology Video Office Visit    Video-Visit Details    Type of service:  Video Visit    Video Start Time: 1105    Video End Time: 1125    Originating Location (pt. Location): Home    Distant Location (provider location):  Off-site     Platform used for Video Visit: [employer]           Assessment and Plan:     Assessment:58 year old female with a right 5mm UVJ stone    Plan:  -Reviewed CT scan with patient. Noted right 5mm UVJ stone which remains unchanged from CT scan in Nov 2023.   -Discussed concern of low probability of stone passage. Discussed option of MET x 3-4 weeks vs definitive stone procedure. She would like to trial MET x 3-4 weeks, if unsuccessful will move forward with definitive stone procedure.   -Please use acetaminophen, ibuprofen, and  dimenhydrinate PRN for pain control. Please continue with silodosin 4mg PO daily to help with stone passage.   -If having severe flank pain, fevers, chills, nausea, or vomiting please notify Urology clinic or be seen in the ER.   -RTC in 4 weeks with CT scan    Marly Velez CNP  Department of Urology  May 6, 2024    I spent a total of 35 minutes spent on the date of the encounter doing chart review, history and exam, documentation, and further activities as noted above.          Chief Complaint:   Right UVJ Stone         History of Present Illness:    Tania Kaufman is a pleasant 58 year old female who presents with concerns of a right ureteral stone.     She notes symptoms initially started in August/Sept of 2023. She has been using silodosin on/off to help with stone passage. She was recently into the ER on 4/19/24 due to nausea/vomiting.     She is doing well today. Denies any pain at this time. Denies any f/c/n/v, gross hematuria, or dysuria.     She is currently on chlorthalidone 12/5mgand potassium citrate powder.     CT scan on 4/19/24 (images personally reviewed) revealed a right 5mm UVJ stone which appears unchanged from CT scan in Nov 2023. Note  bilateral nonobstructing punctate stones.            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     PONV (postoperative nausea and vomiting)             Past Surgical History:     Past Surgical History:   Procedure Laterality Date    EP ABLATION FOCAL AFIB N/A 6/29/2022    Procedure: EP Ablation Atrial Fibrilation;  Surgeon: Seven Moreau MD;  Location:  [facility]    NO PAST SURGERIES      OTHER SURGICAL HISTORY      wisdom teeth removal    [facility] CYSTO/URETERO W/LITHOTRIPSY &INDWELL STENT INSRT Right 6/11/2019    Procedure: CYSTOSCOPY, RIGHT RETROGRADE,  RIGHT URETEROSCOPY WITH LASER LITHOTRIPSY AND STENT INSERTION;  Surgeon: John Gutierrez MD;  Location: [facility];  Service: Urology            Medications     Current Outpatient Medications   Medication Sig Dispense Refill    aspirin (ASA) 81 MG EC tablet Take 1 tablet (81 mg) by mouth daily      chlorthalidone (HYGROTON) 25 MG tablet Take 0.5 tablets (12.5 mg) by mouth daily 90 tablet 1    GLUCOSAMINE SULFATE (GLUCOSAMINE ORAL) Take 1 tablet by mouth daily       L.ACID/L.CASEI/B.BIF/B.LUKE/FOS (PROBIOTIC BLEND ORAL) Take 1 tablet by mouth 2 times daily as needed       melatonin 3 mg Tab tablet [MELATONIN 3 MG TAB TABLET] Take 3 mg by mouth bedtime as needed.      multivitamin therapeutic (THERAGRAN) tablet [MULTIVITAMIN THERAPEUTIC (THERAGRAN) TABLET] Take 1 tablet by mouth daily.      NON FORMULARY 2 times daily Vitamins - Melaleuca (fiber-wise supplement)      POTASSIUM PO Take 2 Scoops by mouth 2 times daily Dr. Mcadams electrolyte powder: contains other electrolytes but has 1 g of K-citrate per scoop.       silodosin (RAPAFLO) 4 MG CAPS capsule Take 1 capsule (4 mg) by mouth daily for 30 days 30 capsule 0     No current facility-administered medications for this visit.            Family History:     Family History   Problem Relation Age of Onset    Cancer Mother         skin    Pacemaker Mother         4 open heart surgeries     Diverticulitis Father     Breast Cancer Other 40.00        great aunt    Urolithiasis Brother         recurrent    Gout No family hx of             Social History:     Social History     Socioeconomic History    Marital status:      Spouse name: Not on file    Number of children: Not on file    Years of education: Not on file    Highest education level: Not on file   Occupational History    Not on file   Tobacco Use    Smoking status: Never    Smokeless tobacco: Never   Substance and Sexual Activity    Alcohol use: Yes     Comment: Alcoholic Drinks/day: social only    Drug use: No    Sexual activity: Not on file   Other Topics Concern    Not on file   Social History Narrative         2 children (senior high school 2023, daughter 19yo swims in college)    Working full time from home as a     Walking 5-8miles per day (1.5hr in AM and PM)    Lashonda Arango MD     Social Determinants of Health     Financial Resource Strain: Not on file   Food Insecurity: Not on file   Transportation Needs: Not on file   Physical Activity: Not on file   Stress: Not on file   Social Connections: Not on file   Interpersonal Safety: Not on file   Housing Stability: Not on file            Allergies:   Patient has no known allergies.         Review of Systems:  From intake questionnaire   Negative 14 system review except as noted on HPI, nurse's note.         Physical Exam:   General Appearance: Well groomed, hygenic  Eyes: No redness, discharge  Respiratory: No cough, no respiratory distress or labored breathing  Musculoskeletal: Grossly normal, full range of motion in upper extremities,  no gross deficits  Skin: No discoloration or apparent rashes  Neurologic - No tremors  Psychiatric - Alert and oriented  The rest of a comprehensive physical examination is deferred due to video visit restrictions        Labs:    I personally reviewed all applicable laboratory data and went over findings with patient  Significant for:    CBC RESULTS:  Recent Labs   Lab Test 04/19/24  2118 04/17/23  1559 07/12/22  0326 06/29/22  0559 06/21/22  1407   WBC 10.6  --  9.6 5.3 5.8   HGB 13.5 13.8 12.5 14.0 14.2     --  278 222 227        BMP RESULTS:  Recent Labs   Lab Test 04/25/24  1453 04/19/24  2118 11/30/23  1239 04/17/23  1559 06/21/22  1407 06/22/21  1501 02/20/20  0823 07/03/19  2355 06/03/19  1630    131* 140 139   < > 140 140 139 139   POTASSIUM 3.5 2.5* 3.2* 3.0*   < > 4.3 4.3 4.1 3.6   CHLORIDE 98 95* 102 97*   < > 105 107 108* 105   CO2 30* 20* 30* 27   < > 24 25 17* 27   ANIONGAP 9 16* 8 15   < > 11 8 14 7   GLC 94 133* 94 99   < > 107 133* 165* 97   BUN 12.1 13.6 10.4 13.3   < > 11 11 12 14   CR 0.81 0.61 0.74 0.76   < > 0.80 0.88 1.01 0.77   GFRESTIMATED 85 >90 >90 >90   < > >60 >60 58* >60   GFRESTBLACK  --   --   --   --   --  >60 >60 >60 >60   INDIA 9.5 9.4 9.7 10.1*   < > 9.7 9.2 9.7 9.6    < > = values in this interval not displayed.       UA RESULTS:   Recent Labs   Lab Test 04/19/24  2241 08/01/23  1050 10/10/19  1515 07/11/19  1459 07/04/19  0420   SG <1.005 <=1.005 1.020   < > 1.030   URINEPH 7.0 6.5 6.0   < > 6.0   NITRITE Negative Negative Negative   < > Negative   RBCU 1 0-2  --   --  >100*   WBCU 1 0-5  --   --  0-5    < > = values in this interval not displayed.       CALCIUM RESULTS  Lab Results   Component Value Date    INDIA 9.5 04/25/2024    INDIA 9.4 04/19/2024    INDIA 9.7 11/30/2023           Imaging:    I personally reviewed all applicable imaging and went over the below findings with patient.    Results for orders placed or performed during the hospital encounter of 04/19/24    CT Abdomen Pelvis w Contrast    Narrative    EXAM: CT ABDOMEN AND PELVIS WITH CONTRAST  LOCATION: [facility]  DATE/TIME: 4/19/2024 9:59 PM CDT    INDICATION: Mid to lower abdominal pain. Nausea and vomiting.  COMPARISON: 08/30/2022.    TECHNIQUE: CT scan of the abdomen and pelvis was performed following injection of IV contrast. Multiplanar reformats were obtained. Dose reduction techniques were used.  CONTRAST: 80 mL Isovue-370.    FINDINGS:    LOWER CHEST: Unremarkable.    HEPATOBILIARY: Possible small gallstone in the gallbladder.    SPLEEN: Unremarkable.    PANCREAS: Unremarkable.    ADRENAL GLANDS: Unremarkable.    KIDNEYS/BLADDER: 0.5 cm calculus in the most distal aspect of the right ureter at the ureterovesicular junction. Slight prominence in caliber of the right ureter. No intrarenal collecting system dilatation.    BOWEL: The stomach, small and large bowel are normal in caliber. Several colonic diverticula are present, without convincing evidence of diverticulitis. The appendix is unremarkable. No convincing bowel wall thickening, pneumatosis or free   intraperitoneal gas.    LYMPH NODES: Unremarkable.    PELVIC ORGANS: No acute findings.    MUSCULOSKELETAL: No acute findings.    OTHER: None.      Impression    IMPRESSION:   1.  0.5 cm distal right ureteral calculus at the ureterovesicular junction. No evidence of significant obstruction due to the calculus.  2.  No other cause of acute pain identified in the abdomen or pelvis.  3.  Colonic diverticulosis without convincing evidence of diverticulitis.

## 2024-05-06 NOTE — PROGRESS NOTES
"Virtual Visit Details    Type of service:  Video Visit     Originating Location (pt. Location): {video visit patient location:868586::\"Home\"}  {PROVIDER LOCATION On-site should be selected for visits conducted from your clinic location or adjoining [facility] hospital, academic office, or other nearby [facility] building. Off-site should be selected for all other provider locations, including home:333067}  Distant Location (provider location):  {virtual location provider:333494}  Platform used for Video Visit: {Virtual Visit Platforms:751689::\"[employer]\"}  "

## 2024-05-16 ENCOUNTER — MYC MEDICAL ADVICE (OUTPATIENT)
Dept: FAMILY MEDICINE | Facility: CLINIC | Age: 59
End: 2024-05-16
Payer: COMMERCIAL

## 2024-05-16 DIAGNOSIS — R11.0 NAUSEA: Primary | ICD-10-CM

## 2024-05-16 RX ORDER — DIMENHYDRINATE 50 MG
50 TABLET ORAL EVERY 6 HOURS PRN
Qty: 30 TABLET | Refills: 1 | Status: SHIPPED | OUTPATIENT
Start: 2024-05-16 | End: 2024-06-23

## 2024-05-16 RX ORDER — ONDANSETRON 4 MG/1
4 TABLET, ORALLY DISINTEGRATING ORAL EVERY 8 HOURS PRN
Qty: 30 TABLET | Refills: 1 | Status: SHIPPED | OUTPATIENT
Start: 2024-05-16 | End: 2024-05-28

## 2024-05-28 ENCOUNTER — MYC REFILL (OUTPATIENT)
Dept: FAMILY MEDICINE | Facility: CLINIC | Age: 59
End: 2024-05-28
Payer: COMMERCIAL

## 2024-05-28 ENCOUNTER — MYC REFILL (OUTPATIENT)
Dept: UROLOGY | Facility: CLINIC | Age: 59
End: 2024-05-28
Payer: COMMERCIAL

## 2024-05-28 DIAGNOSIS — R11.0 NAUSEA: ICD-10-CM

## 2024-05-28 RX ORDER — ONDANSETRON 4 MG/1
4 TABLET, ORALLY DISINTEGRATING ORAL EVERY 8 HOURS PRN
Qty: 30 TABLET | Refills: 1 | Status: CANCELLED | OUTPATIENT
Start: 2024-05-28

## 2024-05-28 RX ORDER — ONDANSETRON 4 MG/1
4 TABLET, ORALLY DISINTEGRATING ORAL EVERY 8 HOURS PRN
Qty: 30 TABLET | Refills: 1 | OUTPATIENT
Start: 2024-05-28

## 2024-06-04 ENCOUNTER — HOSPITAL ENCOUNTER (OUTPATIENT)
Dept: CT IMAGING | Facility: CLINIC | Age: 59
Discharge: HOME | End: 2024-06-04
Attending: NURSE PRACTITIONER | Admitting: NURSE PRACTITIONER
Payer: COMMERCIAL

## 2024-06-04 DIAGNOSIS — N20.1 RIGHT URETERAL STONE: ICD-10-CM

## 2024-06-04 PROCEDURE — 74176 CT ABD & PELVIS W/O CONTRAST: CPT

## 2024-06-05 ENCOUNTER — VIRTUAL VISIT (OUTPATIENT)
Dept: UROLOGY | Facility: CLINIC | Age: 59
End: 2024-06-05
Payer: COMMERCIAL

## 2024-06-05 DIAGNOSIS — N20.0 NEPHROLITHIASIS: ICD-10-CM

## 2024-06-05 DIAGNOSIS — N20.1 RIGHT URETERAL STONE: Primary | ICD-10-CM

## 2024-06-05 PROCEDURE — 99214 OFFICE O/P EST MOD 30 MIN: CPT | Mod: 95 | Performed by: NURSE PRACTITIONER

## 2024-06-05 RX ORDER — TAMSULOSIN HYDROCHLORIDE 0.4 MG/1
0.4 CAPSULE ORAL DAILY
Qty: 30 CAPSULE | Refills: 0 | Status: SHIPPED | OUTPATIENT
Start: 2024-06-05 | End: 2024-06-28

## 2024-06-05 NOTE — PROGRESS NOTES
Urology Video Office Visit    Video-Visit Details    Type of service:  Video Visit    Video Start Time: 1225    Video End Time: 1243    Originating Location (pt. Location): Home    Distant Location (provider location):  Off-site     Platform used for Video Visit: [employer]           Assessment and Plan:     Assessment:58 year old female with a right 5mm UVJ stone    Plan:  -Reviewed CT scan. Noted unchanged position of right UVJ stone in comparison to CT Scan on 4/19/24.   -Discussed concern of low probability of stone passage. She would like to proceed with a definitive stone procedure at this time. Recommend right ureteroscopy and laser lithotripsy. I counseled the patient regarding the potential need for a ureteral stent after treatment and the necessity of removing the stent after surgery. I also discussed the possibility of additional procedures to render the patient stone free.  After discussing risks, benefits, and alternatives to treatment, patient elects to proceed with right URS/LL.  -Will start on tamsulosin 0.4mg PO daily until schedule procedure. Recommend to stop silodosin at this time.  -Please continue to use acetaminophen, ibuprofen, and dimenhydrinate PRN for pain control.  -If having severe flank pain, fevers, chills, nausea, or vomiting please notify Urology clinic or be seen in the ER.     Marly Velez CNP  Department of Urology  June 5, 2024    I spent a total of 30 minutes spent on the date of the encounter doing chart review, history and exam, documentation, and further activities as noted above.          Chief Complaint:   Right UVJ Stone         History of Present Illness:    Tania Kaufman is a pleasant 58 year old female who presents with concerns of a right UVJ stone. PMHx: Afib s/p ablation, IBS-D, and hypothyroidism.    Ms. Kaufman was last seen with the Urology clinic on 5/6/24.     She has been doing well since she was last seen. She recently traveled by air in which she  experienced significant nausea. Has not subsided and well controlled with use of Dramamine.     CT scan on 6/4/24 (images personally reviewed) revealed unchanged position of right 5mm UVJ stone with hydronephrosis. Noted nonobstructing bilateral renal stones. No noted left hydronephrosis     She notes symptoms initially started in August/Sept of 2023. She has been using silodosin on/off to help with stone passage. She was recently into the ER on 4/19/24 due to nausea/vomiting.      CT scan on 4/19/24 (images personally reviewed) revealed a right 5mm UVJ stone which appears unchanged from CT scan in Nov 2023. Note bilateral nonobstructing punctate stones.    She is currently on chlorthalidone 12.5mg and potassium citrate powder.     She has received her 24 hour urine study kit. Recommend to complete after her definitive stone procedure.            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;  Service: Urology            Medications     Current Outpatient Medications   Medication Sig Dispense  Refill    aspirin (ASA) 81 MG EC tablet Take 1 tablet (81 mg) by mouth daily      chlorthalidone (HYGROTON) 25 MG tablet Take 0.5 tablets (12.5 mg) by mouth daily 90 tablet 1    dimenhyDRINATE (DRAMAMINE) 50 MG tablet Take 1 tablet (50 mg) by mouth every 6 hours as needed for other (kidney stone pain management) 30 tablet 1    GLUCOSAMINE SULFATE (GLUCOSAMINE ORAL) Take 1 tablet by mouth daily       L.ACID/L.CASEI/B.BIF/B.LUKE/FOS (PROBIOTIC BLEND ORAL) Take 1 tablet by mouth 2 times daily as needed       melatonin 3 mg Tab tablet [MELATONIN 3 MG TAB TABLET] Take 3 mg by mouth bedtime as needed.      multivitamin therapeutic (THERAGRAN) tablet [MULTIVITAMIN THERAPEUTIC (THERAGRAN) TABLET] Take 1 tablet by mouth daily.      NON FORMULARY 2 times daily Vitamins - Melaleuca (fiber-wise supplement)      ondansetron (ZOFRAN ODT) 4 MG ODT tab Take 1 tablet (4 mg) by mouth every 8 hours as needed for nausea 30 tablet 1    POTASSIUM PO Take 2 Scoops by mouth 2 times daily Dr. Mcadams electrolyte powder: contains other electrolytes but has 1 g of K-citrate per scoop.      silodosin (RAPAFLO) 4 MG CAPS capsule Take 1 capsule (4 mg) by mouth daily 30 capsule 0     No current facility-administered medications for this visit.            Family History:     Family History   Problem Relation Age of Onset    Cancer Mother         skin    Pacemaker Mother         4 open heart surgeries     Diverticulitis Father     Breast Cancer Other 40.00        great aunt    Urolithiasis Brother         recurrent    Gout No family hx of             Social History:     Social History     Socioeconomic History    Marital status:      Spouse name: Not on file    Number of children: Not on file    Years of education: Not on file    Highest education level: Not on file   Occupational History    Not on file   Tobacco Use    Smoking status: Never    Smokeless tobacco: Never   Substance and Sexual Activity    Alcohol use: Yes     Comment: Alcoholic  Drinks/day: social only    Drug use: No    Sexual activity: Not on file   Other Topics Concern    Not on file   Social History Narrative         2 children (senior high school 2023, daughter 21yo swims in college)    Working full time from home as a     Walking 5-8miles per day (1.5hr in AM and PM)    Lashonda Arango MD     Social Determinants of Health     Financial Resource Strain: Not on file   Food Insecurity: Not on file   Transportation Needs: Not on file   Physical Activity: Not on file   Stress: Not on file   Social Connections: Not on file   Interpersonal Safety: Not on file   Housing Stability: Not on file            Allergies:   Patient has no known allergies.         Review of Systems:  From intake questionnaire   Negative 14 system review except as noted on HPI, nurse's note.         Physical Exam:   General Appearance: Well groomed, hygenic  Eyes: No redness, discharge  Respiratory: No cough, no respiratory distress or labored breathing  Musculoskeletal: Grossly normal, full range of motion in upper extremities, no gross deficits  Skin: No discoloration or apparent rashes  Neurologic - No tremors  Psychiatric - Alert and oriented  The rest of a comprehensive physical examination is deferred due to video visit restrictions        Labs:    I personally reviewed all applicable laboratory data and went over findings with patient  Significant for:    CBC RESULTS:  Recent Labs   Lab Test 04/19/24  2118 04/17/23  1559 07/12/22  0326 06/29/22  0559 06/21/22  1407   WBC 10.6  --  9.6 5.3 5.8   HGB 13.5 13.8 12.5 14.0 14.2     --  278 222 227        BMP RESULTS:  Recent Labs   Lab Test 04/25/24  1453 04/19/24  2118 11/30/23  1239 04/17/23  1559 06/21/22  1407 06/22/21  1501 02/20/20  0823 07/03/19  2355 06/03/19  1630    131* 140 139   < > 140 140 139 139   POTASSIUM 3.5 2.5* 3.2* 3.0*   < > 4.3 4.3 4.1 3.6   CHLORIDE 98 95* 102 97*   < > 105 107 108* 105   CO2 30* 20* 30* 27    < > 24 25 17* 27   ANIONGAP 9 16* 8 15   < > 11 8 14 7   GLC 94 133* 94 99   < > 107 133* 165* 97   BUN 12.1 13.6 10.4 13.3   < > 11 11 12 14   CR 0.81 0.61 0.74 0.76   < > 0.80 0.88 1.01 0.77   GFRESTIMATED 85 >90 >90 >90   < > >60 >60 58* >60   GFRESTBLACK  --   --   --   --   --  >60 >60 >60 >60   INDIA 9.5 9.4 9.7 10.1*   < > 9.7 9.2 9.7 9.6    < > = values in this interval not displayed.       UA RESULTS:   Recent Labs   Lab Test 04/19/24  2241 08/01/23  1050 10/10/19  1515 07/11/19  1459 07/04/19  0420   SG <1.005 <=1.005 1.020   < > 1.030   URINEPH 7.0 6.5 6.0   < > 6.0   NITRITE Negative Negative Negative   < > Negative   RBCU 1 0-2  --   --  >100*   WBCU 1 0-5  --   --  0-5    < > = values in this interval not displayed.       CALCIUM RESULTS  Lab Results   Component Value Date    INDIA 9.5 04/25/2024    INDIA 9.4 04/19/2024    INDIA 9.7 11/30/2023           Imaging:    I personally reviewed all applicable imaging and went over the below findings with patient.    Results for orders placed or performed during the hospital encounter of 06/04/24   CT Abdomen Pelvis w/o Contrast    Narrative    EXAM: CT ABDOMEN PELVIS W/O CONTRAST  LOCATION: [facility]  DATE: 6/4/2024    INDICATION: right distal ureteral stone  COMPARISON: 4/19/2024  TECHNIQUE: CT scan of the abdomen and pelvis was performed without IV contrast. Multiplanar reformats were obtained. Dose reduction techniques were used.  CONTRAST: None.    FINDINGS:     LOWER CHEST: Unremarkable.    Limited evaluation of solid organs given lack of intravenous contrast.     HEPATOBILIARY: Normal liver contours. No biliary dilation. Normal gallbladder.     PANCREAS: Normal contours.     SPLEEN: Normal contours.    ADRENAL GLANDS: Normal contours.     KIDNEYS/BLADDER: Normal unenhanced contours. Unchanged position of a 6 mm right ureterovesicular junction stone, with trace upstream right hydroureteronephrosis. Additional nonobstructing right  calyceal stones are present. Normal urinary bladder.      BOWEL: No obstruction. Normal appendix. Colonic diverticulosis. No pneumoperitoneum or free fluid.     LYMPH NODES: No pathologically enlarged lymph nodes.    VASCULATURE: Nonaneurysmal aorta.     PELVIC ORGANS: No pelvic masses.     MUSCULOSKELETAL: No acute osseous abnormalities. Tiny, fat-containing umbilical hernia.       Impression    IMPRESSION:  1.  Unchanged position of a 6 mm right ureterovesicular junction stone, with trace upstream right hydroureteronephrosis.   2.  Additional nonobstructing right calyceal stones are present elsewhere.

## 2024-06-05 NOTE — NURSING NOTE
Is the patient currently in the state of [place]? YES    Visit mode:VIDEO    If the visit is dropped, the patient can be reconnected by: VIDEO VISIT: Text to cell phone:   Telephone Information:   Mobile [phone]       Will anyone else be joining the visit? NO  (If patient encounters technical issues they should call [phone])    How would you like to obtain your AVS? MyChart    Are changes needed to the allergy or medication list? No, Pt stated no changes to allergies, and Pt stated no med changes    Are refills needed on medications prescribed by this physician? NO    Reason for visit: GINGER SNELL       Pt with YUNIEL on CKD likely multifactorial in the setting of recent COVID infection, poor oral intake. Scr was elevated at 2.20 in September 2022. Pt was recently admitted at [facility]. Scr on admission was significantly elevated at 8.94 on 11/25/22. Pt received 1st HD session on 11/26/22. Last HD session was done on 12/1/22. HD catheter was removed as kidney function was recovering. Scr was elevated at 5.28 on transfer from [facility] on 12/15/22. Scr remained elevated/stable at 5.40 on 1/5/23. Of note kidney sonogram had discrepant size renal artery duplex showing HESHAM but BP well controlled. Discussed with Dr. Dukes (12/21) who knows the patient very well and has seen him inpatient and outpatient over the last several years. As per Dr. Dukes the patient's baseline creatinine is 3 and has advanced CKD and is nearing ESKD. Pt underwent cardiac cath on 1/4/23 , tolerated procedure well. Underwent LUE AVF creation on 1/5/23. Last Scr is elevated/stable at 4.86 today (1/13/23). Pt non uremic, non oliguric. No urgent indication for HD. Monitor labs and urine output. Avoid any potential nephrotoxins. Dose medications as per eGFR.

## 2024-06-06 ENCOUNTER — TELEPHONE (OUTPATIENT)
Dept: UROLOGY | Facility: CLINIC | Age: 59
End: 2024-06-06
Payer: COMMERCIAL

## 2024-06-06 PROBLEM — N20.1 RIGHT URETERAL STONE: Status: ACTIVE | Noted: 2024-06-05

## 2024-06-06 NOTE — TELEPHONE ENCOUNTER
Spoke with: Patient       Date of surgery: Friday June 28 2024 with Dr Salinas      Location: [facility]      Informed patient they will need a adult : YES      Pre op with provider: Patient will schedule at [facility]       H&P Scheduled in PAC- NA         Pre procedure covid :Not req      Additional imaging: NA        Surgery Packet : Sent via [employer]       Additional comments: Please call for surgery teaching

## 2024-06-12 ENCOUNTER — TELEPHONE (OUTPATIENT)
Dept: UROLOGY | Facility: CLINIC | Age: 59
End: 2024-06-12
Payer: COMMERCIAL

## 2024-06-12 DIAGNOSIS — N20.0 NEPHROLITHIASIS: Primary | ICD-10-CM

## 2024-06-12 NOTE — TELEPHONE ENCOUNTER
Left message for pt to call RN regarding upcoming surgery with Dr. Salinas. Provided RN direct number for follow up.     MELINA Lozada  Care Coordinator- Urology   [phone]

## 2024-06-19 ENCOUNTER — TELEPHONE (OUTPATIENT)
Dept: UROLOGY | Facility: CLINIC | Age: 59
End: 2024-06-19
Payer: COMMERCIAL

## 2024-06-19 DIAGNOSIS — N20.0 NEPHROLITHIASIS: Primary | ICD-10-CM

## 2024-06-19 NOTE — TELEPHONE ENCOUNTER
Procedure: Cystoscopy, Right Ureteroscopy, Right Holmium Laser Lithotripsy, Right Retrograde Pyelogram, Possible Right Ureteral Stent Placement     Date: 06/28  Provider: Rita  Time: [facility] time tbd, arrive 1.5 hour prior    Pre op appt: pt will call to schedule today [facility]  UA/UC: Ordered, pt will complete at pre-op    Reviewed medications (anticoagulants, diabetes medications etc): reviewed  Soap: reviewed  Reviewed when to start clear liquids and when to start NPO: yes  Confirmed pt has  and 24 hour observation: yes    Pt or family member expressed understanding: yes    Kierra Joseph, RN  6/19/2024  4:10 PM

## 2024-06-20 DIAGNOSIS — N20.1 RIGHT URETERAL STONE: ICD-10-CM

## 2024-06-20 RX ORDER — SILODOSIN 4 MG/1
4 CAPSULE ORAL DAILY
Qty: 30 CAPSULE | Refills: 0 | Status: SHIPPED | OUTPATIENT
Start: 2024-06-20 | End: 2024-06-26

## 2024-06-23 ENCOUNTER — MYC REFILL (OUTPATIENT)
Dept: FAMILY MEDICINE | Facility: CLINIC | Age: 59
End: 2024-06-23
Payer: COMMERCIAL

## 2024-06-23 ENCOUNTER — MYC REFILL (OUTPATIENT)
Dept: UROLOGY | Facility: CLINIC | Age: 59
End: 2024-06-23
Payer: COMMERCIAL

## 2024-06-23 DIAGNOSIS — R11.0 NAUSEA: ICD-10-CM

## 2024-06-24 RX ORDER — ONDANSETRON 4 MG/1
4 TABLET, ORALLY DISINTEGRATING ORAL EVERY 8 HOURS PRN
Qty: 30 TABLET | Refills: 0 | Status: SHIPPED | OUTPATIENT
Start: 2024-06-24 | End: 2024-07-11

## 2024-06-25 RX ORDER — DIMENHYDRINATE 50 MG
50 TABLET ORAL EVERY 6 HOURS PRN
Qty: 30 TABLET | Refills: 1 | Status: SHIPPED | OUTPATIENT
Start: 2024-06-25 | End: 2024-07-11

## 2024-06-26 ENCOUNTER — OFFICE VISIT (OUTPATIENT)
Dept: FAMILY MEDICINE | Facility: CLINIC | Age: 59
End: 2024-06-26
Payer: COMMERCIAL

## 2024-06-26 VITALS
BODY MASS INDEX: 32.8 KG/M2 | RESPIRATION RATE: 16 BRPM | OXYGEN SATURATION: 96 % | HEART RATE: 77 BPM | SYSTOLIC BLOOD PRESSURE: 96 MMHG | HEIGHT: 63 IN | WEIGHT: 185.1 LBS | TEMPERATURE: 97.6 F | DIASTOLIC BLOOD PRESSURE: 70 MMHG

## 2024-06-26 DIAGNOSIS — N20.0 KIDNEY STONE: ICD-10-CM

## 2024-06-26 DIAGNOSIS — N20.0 NEPHROLITHIASIS: ICD-10-CM

## 2024-06-26 DIAGNOSIS — Z01.818 PREOP GENERAL PHYSICAL EXAM: Primary | ICD-10-CM

## 2024-06-26 DIAGNOSIS — R11.0 NAUSEA: ICD-10-CM

## 2024-06-26 LAB
ALBUMIN UR-MCNC: NEGATIVE MG/DL
ANION GAP SERPL CALCULATED.3IONS-SCNC: 9 MMOL/L (ref 7–15)
APPEARANCE UR: CLEAR
BILIRUB UR QL STRIP: NEGATIVE
BUN SERPL-MCNC: 16.4 MG/DL (ref 6–20)
CALCIUM SERPL-MCNC: 9.7 MG/DL (ref 8.6–10)
CHLORIDE SERPL-SCNC: 100 MMOL/L (ref 98–107)
COLOR UR AUTO: YELLOW
CREAT SERPL-MCNC: 0.85 MG/DL (ref 0.51–0.95)
DEPRECATED HCO3 PLAS-SCNC: 28 MMOL/L (ref 22–29)
EGFRCR SERPLBLD CKD-EPI 2021: 80 ML/MIN/1.73M2
GLUCOSE SERPL-MCNC: 102 MG/DL (ref 70–99)
GLUCOSE UR STRIP-MCNC: NEGATIVE MG/DL
HGB UR QL STRIP: ABNORMAL
KETONES UR STRIP-MCNC: NEGATIVE MG/DL
LEUKOCYTE ESTERASE UR QL STRIP: NEGATIVE
NITRATE UR QL: NEGATIVE
PH UR STRIP: 7 [PH] (ref 5–8)
POTASSIUM SERPL-SCNC: 3.6 MMOL/L (ref 3.4–5.3)
RBC #/AREA URNS AUTO: NORMAL /HPF
SODIUM SERPL-SCNC: 137 MMOL/L (ref 135–145)
SP GR UR STRIP: 1.01 (ref 1–1.03)
UROBILINOGEN UR STRIP-ACNC: 0.2 E.U./DL
WBC #/AREA URNS AUTO: NORMAL /HPF

## 2024-06-26 PROCEDURE — 87086 URINE CULTURE/COLONY COUNT: CPT | Performed by: NURSE PRACTITIONER

## 2024-06-26 PROCEDURE — 36415 COLL VENOUS BLD VENIPUNCTURE: CPT | Performed by: NURSE PRACTITIONER

## 2024-06-26 PROCEDURE — 80048 BASIC METABOLIC PNL TOTAL CA: CPT | Performed by: NURSE PRACTITIONER

## 2024-06-26 PROCEDURE — 99214 OFFICE O/P EST MOD 30 MIN: CPT | Performed by: NURSE PRACTITIONER

## 2024-06-26 PROCEDURE — 81001 URINALYSIS AUTO W/SCOPE: CPT | Performed by: NURSE PRACTITIONER

## 2024-06-26 RX ORDER — DIMENHYDRINATE 50 MG
50 TABLET ORAL EVERY 6 HOURS PRN
Qty: 30 TABLET | Refills: 1 | Status: CANCELLED | OUTPATIENT
Start: 2024-06-26

## 2024-06-26 RX ORDER — ONDANSETRON 4 MG/1
4 TABLET, ORALLY DISINTEGRATING ORAL EVERY 8 HOURS PRN
Qty: 30 TABLET | Refills: 0 | Status: CANCELLED | OUTPATIENT
Start: 2024-06-26

## 2024-06-26 ASSESSMENT — PAIN SCALES - GENERAL: PAINLEVEL: MILD PAIN (2)

## 2024-06-26 NOTE — PROGRESS NOTES
Preoperative Evaluation  [facility]  [address], [address] [facility] [address]  [place] [address]  Phone: [phone]  Fax: [phone]  Primary Provider: Lashonda Arango MD  Pre-op Performing Provider: Katarzyna Bagley CNP  Jun 26, 2024 6/23/2024   Surgical Information   What procedure is being done? Kidney stone extraction pre op   Facility or Hospital where procedure/surgery will be performed: [facility]   Who is doing the procedure / surgery? ?   Date of surgery / procedure: 6/28   Time of surgery / procedure: 8   Where do you plan to recover after surgery? at home with family        Fax number for surgical facility: [phone]      Tony Harmon is a 58 year old, presenting for the following:  Pre-Op Exam ( Surgery Date. 06/28/2024 - Urology Cystoscopy, right Ureteroscopy, Right Holmium laser Lithoscopy, Rt Retrograde Pyelogram and possible Rt. Ureteral Stent placement. Kidney Stone Friday. UA needed today. / Dr[facility] Salinas. [facility] [phone])          6/26/2024     8:33 AM   Additional Questions   Roomed by Collette SMITH     [facility] related to upcoming procedure: Kidney Stone removal.  Kidney stone since 8/2024.  Dull pain, nausea- not improving.          6/23/2024   Pre-Op Questionnaire   Have you ever had a heart attack or stroke? No   Have you ever had surgery on your heart or blood vessels, such as a stent placement, a coronary artery bypass, or surgery on an artery in your head, neck, heart, or legs? (!) YES ablation   Do you have chest pain with activity? No   Do you have a history of heart failure? No   Do you currently have a cold, bronchitis or symptoms of other infection? No   Do you have a cough, shortness of breath, or wheezing? No   Do you or anyone in your family have previous history of blood clots? (!) YES- mother   Do you or does anyone in your family have a serious bleeding problem such as  prolonged bleeding following surgeries or cuts? No   Have you ever had problems with anemia or been told to take iron pills? No   Have you had any abnormal blood loss such as black, tarry or bloody stools, or abnormal vaginal bleeding? No   Have you ever had a blood transfusion? No   Are you willing to have a blood transfusion if it is medically needed before, during, or after your surgery? (!) NO    Have you or any of your relatives ever had problems with anesthesia? No   Do you have sleep apnea, excessive snoring or daytime drowsiness? No   Do you have any artifical heart valves or other implanted medical devices like a pacemaker, defibrillator, or continuous glucose monitor? No   Do you have artificial joints? No   Are you allergic to latex? No        Health Care Directive  Patient does not have a Health Care Directive or Living Will: Discussed advance care planning with patient; information given to patient to review.    Preoperative Review of    reviewed - no record of controlled substances prescribed.      Patient Active Problem List    Diagnosis Date Noted    Right ureteral stone 06/05/2024     Priority: Medium    Mild obesity 04/25/2024     Priority: Medium    S/P ablation of atrial fibrillation 08/10/2022     Priority: Medium     No longer needing meds/anticoagulation since PVI 6/2022.       Diverticulitis of colon 07/01/2021     Priority: Medium    Hypercalciuria 10/10/2019     Priority: Medium     Hx of several kidney stones; hypercalciuria 10/3/2022 media tab with lab result and plan. Started on chlorthalidone      Hypothyroidism 04/08/2003     Priority: Medium     Subclinical hypothyroidism based on 7/2022 labs. Continue to monitor. Weight loss encouraged      Irritable bowel syndrome with diarrhea 01/01/2000     Priority: Medium    Kidney stone 01/01/2000     Priority: Medium     Hx of several kidney stones; hypercalciuria 10/3/2022 media tab with lab result and plan. 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     PONV (postoperative nausea and vomiting)      Past Surgical History:   Procedure Laterality Date    CARDIAC SURGERY  7/2019    Ablation    EP ABLATION FOCAL AFIB N/A 06/29/2022    Procedure: EP Ablation Atrial Fibrilation;  Surgeon: Seven Moreau MD;  Location:  [facility]    EYE SURGERY  2005    Lasix    NO PAST SURGERIES      OTHER SURGICAL HISTORY      wisdom teeth removal    PA CYSTO/URETERO W/LITHOTRIPSY &INDWELL STENT INSRT Right 06/11/2019    Procedure: CYSTOSCOPY, RIGHT RETROGRADE,  RIGHT URETEROSCOPY WITH LASER LITHOTRIPSY AND STENT INSERTION;  Surgeon: John Gutierrez MD;  Location: [facility];  Service: Urology     Current Outpatient Medications   Medication Sig Dispense Refill    aspirin (ASA) 81 MG EC tablet Take 1 tablet (81 mg) by mouth daily      chlorthalidone (HYGROTON) 25 MG tablet Take 0.5 tablets (12.5 mg) by mouth daily 90 tablet 1    dimenhyDRINATE (DRAMAMINE) 50 MG tablet Take 1 tablet (50 mg) by mouth every 6 hours as needed for other (kidney stone pain management) 30 tablet 1    GLUCOSAMINE SULFATE (GLUCOSAMINE ORAL) Take 1 tablet by mouth daily       L.ACID/L.CASEI/B.BIF/B.LUKE/FOS (PROBIOTIC BLEND ORAL) Take 1 tablet by mouth 2 times daily as needed       melatonin 3 mg Tab tablet [MELATONIN 3 MG TAB TABLET] Take 3 mg by mouth bedtime as needed.      multivitamin therapeutic (THERAGRAN) tablet [MULTIVITAMIN THERAPEUTIC (THERAGRAN) TABLET] Take 1 tablet by mouth daily.      NON FORMULARY 2 times daily Vitamins - Melaleuca (fiber-wise supplement)      ondansetron (ZOFRAN ODT) 4 MG ODT tab Take 1 tablet (4 mg)  "by mouth every 8 hours as needed for nausea 30 tablet 0    tamsulosin (FLOMAX) 0.4 MG capsule Take 1 capsule (0.4 mg) by mouth daily 30 capsule 0    POTASSIUM PO Take 2 Scoops by mouth 2 times daily Dr. Mcadams electrolyte powder: contains other electrolytes but has 1 g of K-citrate per scoop.      silodosin (RAPAFLO) 4 MG CAPS capsule TAKE 1 CAPSULE(4 MG) BY MOUTH DAILY (Patient not taking: Reported on 6/26/2024) 30 capsule 0       No Known Allergies     Social History     Tobacco Use    Smoking status: Never     Passive exposure: Never    Smokeless tobacco: Never   Substance Use Topics    Alcohol use: Yes     Comment: I have maybe one glass of wine per month     Family History   Problem Relation Age of Onset    Cancer Mother         skin    Pacemaker Mother         4 open heart surgeries     Diverticulitis Father     Breast Cancer Other 40        great aunt    Urolithiasis Brother         recurrent    Gout No family hx of      History   Drug Use No             Review of Systems  CONSTITUTIONAL: NEGATIVE for fever, chills, change in weight  INTEGUMENTARY/SKIN: NEGATIVE for worrisome rashes, moles or lesions  EYES: NEGATIVE for vision changes or irritation  ENT/MOUTH: NEGATIVE for ear, mouth and throat problems  RESP: NEGATIVE for significant cough or SOB  CV: NEGATIVE for chest pain, palpitations or peripheral edema.  Hx atrial fibrillation- resolved with ablation.   GI: nausea, right kidney stone  : NEGATIVE for frequency, dysuria, or hematuria  MUSCULOSKELETAL: NEGATIVE for significant arthralgias or myalgia  NEURO: NEGATIVE for weakness or paresthesias.  + dizziness  ENDOCRINE: NEGATIVE for temperature intolerance, skin/hair changes  HEME: NEGATIVE for bleeding problems  PSYCHIATRIC: NEGATIVE for changes in mood or affect    Objective    BP 96/70 (BP Location: Left arm, Patient Position: Sitting, Cuff Size: Adult Regular)   Pulse 77   Temp 97.6  F (36.4  C) (Oral)   Resp 16   Ht 1.608 m (5' 3.3\")   Wt 84 kg " "(185 lb 1.6 oz)   LMP 10/31/2018 (Approximate)   SpO2 96%   Breastfeeding No   BMI 32.48 kg/m     Estimated body mass index is 32.48 kg/m  as calculated from the following:    Height as of this encounter: 1.608 m (5' 3.3\").    Weight as of this encounter: 84 kg (185 lb 1.6 oz).  Physical Exam  GENERAL: alert and no distress  EYES: Eyes grossly normal to inspection, PERRL and conjunctivae and sclerae normal  HEENT: ear canals and TM's normal, nose and mouth without ulcers or lesions  NECK: no adenopathy, no asymmetry, masses, or scars  RESP: lungs clear to auscultation - no rales, rhonchi or wheezes  CV: regular rate and rhythm, normal S1 S2, no S3 or S4, no murmur, click or rub, no peripheral edema  ABDOMEN: soft, nontender, no hepatosplenomegaly, no masses and bowel sounds normal  MS: no gross musculoskeletal defects noted, no edema  SKIN: no suspicious lesions or rashes  NEURO: Normal strength and tone, mentation intact and speech normal  PSYCH: mentation appears normal, affect normal/bright    Recent Labs   Lab Test 04/25/24  1453 04/19/24  2118   HGB  --  13.5   PLT  --  230    131*   POTASSIUM 3.5 2.5*   CR 0.81 0.61        Diagnostics  Labs pending at this time.  Results will be reviewed when available.   No EKG required for low risk surgery (cataract, skin procedure, breast biopsy, etc).    Revised Cardiac Risk Index (RCRI)  The patient has the following serious cardiovascular risks for perioperative complications:   - No serious cardiac risks = 0 points     RCRI Interpretation: 1 point: Class II (low risk - 0.9% complication rate)     A/P:  1. Preop general physical exam  Cleared for surgery  - Basic metabolic panel; Future    2. Kidney stone  Surgery as scheduled        Signed Electronically by: Katarzyna Bagley CNP  Copy of this evaluation report is provided to requesting physician.       "

## 2024-06-26 NOTE — PATIENT INSTRUCTIONS
How to Take Your Medication Before Surgery  Preoperative Medication Instructions   Antiplatelet or Anticoagulation Medication Instructions   - aspirin: Bleeding risk is low for this procedure and daily aspirin may be continued without modification.     Additional Medication Instructions   - Diuretics: DO NOT TAKE on the day of surgery.       Patient Education   Preparing for Your Surgery  Getting started  A nurse will call you to review your health history and instructions. They will give you an arrival time based on your scheduled surgery time. Please be ready to share:  Your doctor's clinic name and phone number  Your medical, surgical, and anesthesia history  A list of allergies and sensitivities  A list of medicines, including herbal treatments and over-the-counter drugs  Whether the patient has a legal guardian (ask how to send us the papers in advance)  Please tell us if you're pregnant--or if there's any chance you might be pregnant. Some surgeries may injure a fetus (unborn baby), so they require a pregnancy test. Surgeries that are safe for a fetus don't always need a test, and you can choose whether to have one.   If you have a child who's having surgery, please ask for a copy of Preparing for Your Child's Surgery.    Preparing for surgery  Within 10 to 30 days of surgery: Have a pre-op exam (sometimes called an H&P, or History and Physical). This can be done at a clinic or pre-operative center.  If you're having a , you may not need this exam. Talk to your care team.  At your pre-op exam, talk to your care team about all medicines you take. If you need to stop any medicines before surgery, ask when to start taking them again.  We do this for your safety. Many medicines can make you bleed too much during surgery. Some change how well surgery (anesthesia) drugs work.  Call your insurance company to let them know you're having surgery. (If you don't have insurance, call [phone].)  Call your  clinic if there's any change in your health. This includes signs of a cold or flu (sore throat, runny nose, cough, rash, fever). It also includes a scrape or scratch near the surgery site.  If you have questions on the day of surgery, call your hospital or surgery center.  Eating and drinking guidelines  For your safety: Unless your surgeon tells you otherwise, follow the guidelines below.  Eat and drink as usual until 8 hours before you arrive for surgery. After that, no food or milk.  Drink clear liquids until 2 hours before you arrive. These are liquids you can see through, like water, Gatorade, and Propel Water. They also include plain black coffee and tea (no cream or milk), candy, and breath mints. You can spit out gum when you arrive.  If you drink alcohol: Stop drinking it the night before surgery.  If your care team tells you to take medicine on the morning of surgery, it's okay to take it with a sip of water.  Preventing infection  Shower or bathe the night before and morning of your surgery. Follow the instructions your clinic gave you. (If no instructions, use regular soap.)  Don't shave or clip hair near your surgery site. We'll remove the hair if needed.  Don't smoke or vape the morning of surgery. You may chew nicotine gum up to 2 hours before surgery. A nicotine patch is okay.  Note: Some surgeries require you to completely quit smoking and nicotine. Check with your surgeon.  Your care team will make every effort to keep you safe from infection. We will:  Clean our hands often with soap and water (or an alcohol-based hand rub).  Clean the skin at your surgery site with a special soap that kills germs.  Give you a special gown to keep you warm. (Cold raises the risk of infection.)  Wear special hair covers, masks, gowns and gloves during surgery.  Give antibiotic medicine, if prescribed. Not all surgeries need antibiotics.  What to bring on the day of surgery  Photo ID and insurance card  Copy of your  health care directive, if you have one  Glasses and hearing aids (bring cases)  You can't wear contacts during surgery  Inhaler and eye drops, if you use them (tell us about these when you arrive)  CPAP machine or breathing device, if you use them  A few personal items, if spending the night  If you have . . .  A pacemaker, ICD (cardiac defibrillator) or other implant: Bring the ID card.  An implanted stimulator: Bring the remote control.  A legal guardian: Bring a copy of the certified (court-stamped) guardianship papers.  Please remove any jewelry, including body piercings. Leave jewelry and other valuables at home.  If you're going home the day of surgery  You must have a responsible adult drive you home. They should stay with you overnight as well.  If you don't have someone to stay with you, and you aren't safe to go home alone, we may keep you overnight. Insurance often won't pay for this.  After surgery  If it's hard to control your pain or you need more pain medicine, please call your surgeon's office.  Questions?   If you have any questions for your care team, list them here: _________________________________________________________________________________________________________________________________________________________________________ ____________________________________ ____________________________________ ____________________________________  For informational purposes only. Not to replace the advice of your health care provider. Copyright   2003, 2019 [facility]. All rights reserved. Clinically reviewed by Michelle Lee MD. [employer] [id number] - REV 12/22.

## 2024-06-26 NOTE — TELEPHONE ENCOUNTER
Chart review- Negative UA 06/26. Cleared by pre-op.     Kierra RN  Care Coordinator- Urology   [phone]

## 2024-06-27 ENCOUNTER — ANESTHESIA EVENT (OUTPATIENT)
Dept: SURGERY | Facility: AMBULATORY SURGERY CENTER | Age: 59
End: 2024-06-27
Payer: COMMERCIAL

## 2024-06-27 LAB — BACTERIA UR CULT: NORMAL

## 2024-06-28 ENCOUNTER — ANESTHESIA (OUTPATIENT)
Dept: SURGERY | Facility: AMBULATORY SURGERY CENTER | Age: 59
End: 2024-06-28
Payer: COMMERCIAL

## 2024-06-28 ENCOUNTER — HOSPITAL ENCOUNTER (OUTPATIENT)
Facility: AMBULATORY SURGERY CENTER | Age: 59
Discharge: HOME | End: 2024-06-28
Attending: UROLOGY
Payer: COMMERCIAL

## 2024-06-28 VITALS
SYSTOLIC BLOOD PRESSURE: 123 MMHG | RESPIRATION RATE: 16 BRPM | DIASTOLIC BLOOD PRESSURE: 70 MMHG | HEIGHT: 64 IN | WEIGHT: 180 LBS | OXYGEN SATURATION: 99 % | BODY MASS INDEX: 30.73 KG/M2 | HEART RATE: 70 BPM | TEMPERATURE: 97.2 F

## 2024-06-28 DIAGNOSIS — N20.1 RIGHT URETERAL STONE: ICD-10-CM

## 2024-06-28 PROCEDURE — 99000 SPECIMEN HANDLING OFFICE-LAB: CPT | Performed by: INTERNAL MEDICINE

## 2024-06-28 PROCEDURE — 52356 CYSTO/URETERO W/LITHOTRIPSY: CPT | Mod: RT | Performed by: UROLOGY

## 2024-06-28 PROCEDURE — 74420 UROGRAPHY RTRGR +-KUB: CPT | Mod: 26 | Performed by: UROLOGY

## 2024-06-28 PROCEDURE — 82365 CALCULUS SPECTROSCOPY: CPT | Mod: 90 | Performed by: INTERNAL MEDICINE

## 2024-06-28 DEVICE — STENT, URETERAL, 4.8FR X 24CM, HYDROPLUS COATING, WITHOUT WIRE, PERCUFLEX PLUS: Type: IMPLANTABLE DEVICE | Site: URETER | Status: FUNCTIONAL

## 2024-06-28 RX ORDER — ONDANSETRON 2 MG/ML
4 INJECTION INTRAMUSCULAR; INTRAVENOUS EVERY 30 MIN PRN
Status: DISCONTINUED | OUTPATIENT
Start: 2024-06-28 | End: 2024-06-29 | Stop reason: HOSPADM

## 2024-06-28 RX ORDER — PHENAZOPYRIDINE HYDROCHLORIDE 200 MG/1
200 TABLET, FILM COATED ORAL 3 TIMES DAILY PRN
Qty: 12 TABLET | Refills: 0 | Status: SHIPPED | OUTPATIENT
Start: 2024-06-28 | End: 2024-08-01

## 2024-06-28 RX ORDER — LIDOCAINE 40 MG/G
CREAM TOPICAL
Status: DISCONTINUED | OUTPATIENT
Start: 2024-06-28 | End: 2024-06-29 | Stop reason: HOSPADM

## 2024-06-28 RX ORDER — ONDANSETRON 4 MG/1
4 TABLET, ORALLY DISINTEGRATING ORAL EVERY 30 MIN PRN
Status: DISCONTINUED | OUTPATIENT
Start: 2024-06-28 | End: 2024-06-29 | Stop reason: HOSPADM

## 2024-06-28 RX ORDER — TAMSULOSIN HYDROCHLORIDE 0.4 MG/1
0.4 CAPSULE ORAL DAILY
Qty: 30 CAPSULE | Refills: 0 | Status: SHIPPED | OUTPATIENT
Start: 2024-06-28 | End: 2024-08-01

## 2024-06-28 RX ORDER — GLYCOPYRROLATE 0.2 MG/ML
INJECTION, SOLUTION INTRAMUSCULAR; INTRAVENOUS PRN
Status: DISCONTINUED | OUTPATIENT
Start: 2024-06-28 | End: 2024-06-28

## 2024-06-28 RX ORDER — NALOXONE HYDROCHLORIDE 0.4 MG/ML
0.1 INJECTION, SOLUTION INTRAMUSCULAR; INTRAVENOUS; SUBCUTANEOUS
Status: DISCONTINUED | OUTPATIENT
Start: 2024-06-28 | End: 2024-06-29 | Stop reason: HOSPADM

## 2024-06-28 RX ORDER — DEXAMETHASONE SODIUM PHOSPHATE 4 MG/ML
4 INJECTION, SOLUTION INTRA-ARTICULAR; INTRALESIONAL; INTRAMUSCULAR; INTRAVENOUS; SOFT TISSUE
Status: DISCONTINUED | OUTPATIENT
Start: 2024-06-28 | End: 2024-06-29 | Stop reason: HOSPADM

## 2024-06-28 RX ORDER — FENTANYL CITRATE 0.05 MG/ML
25 INJECTION, SOLUTION INTRAMUSCULAR; INTRAVENOUS EVERY 5 MIN PRN
Status: DISCONTINUED | OUTPATIENT
Start: 2024-06-28 | End: 2024-06-29 | Stop reason: HOSPADM

## 2024-06-28 RX ORDER — PROPOFOL 10 MG/ML
INJECTION, EMULSION INTRAVENOUS CONTINUOUS PRN
Status: DISCONTINUED | OUTPATIENT
Start: 2024-06-28 | End: 2024-06-28

## 2024-06-28 RX ORDER — PROPOFOL 10 MG/ML
INJECTION, EMULSION INTRAVENOUS PRN
Status: DISCONTINUED | OUTPATIENT
Start: 2024-06-28 | End: 2024-06-28

## 2024-06-28 RX ORDER — HYDROMORPHONE HCL IN WATER/PF 6 MG/30 ML
0.4 PATIENT CONTROLLED ANALGESIA SYRINGE INTRAVENOUS EVERY 5 MIN PRN
Status: DISCONTINUED | OUTPATIENT
Start: 2024-06-28 | End: 2024-06-29 | Stop reason: HOSPADM

## 2024-06-28 RX ORDER — OXYBUTYNIN CHLORIDE 5 MG/1
5 TABLET, EXTENDED RELEASE ORAL DAILY
Qty: 14 TABLET | Refills: 0 | Status: SHIPPED | OUTPATIENT
Start: 2024-06-28 | End: 2024-08-01

## 2024-06-28 RX ORDER — MEPERIDINE HYDROCHLORIDE 25 MG/ML
12.5 INJECTION INTRAMUSCULAR; INTRAVENOUS; SUBCUTANEOUS EVERY 5 MIN PRN
Status: DISCONTINUED | OUTPATIENT
Start: 2024-06-28 | End: 2024-06-29 | Stop reason: HOSPADM

## 2024-06-28 RX ORDER — OXYCODONE HYDROCHLORIDE 10 MG/1
10 TABLET ORAL
Status: DISCONTINUED | OUTPATIENT
Start: 2024-06-28 | End: 2024-06-29 | Stop reason: HOSPADM

## 2024-06-28 RX ORDER — HYDROMORPHONE HCL IN WATER/PF 6 MG/30 ML
0.2 PATIENT CONTROLLED ANALGESIA SYRINGE INTRAVENOUS EVERY 5 MIN PRN
Status: DISCONTINUED | OUTPATIENT
Start: 2024-06-28 | End: 2024-06-29 | Stop reason: HOSPADM

## 2024-06-28 RX ORDER — SODIUM CHLORIDE, SODIUM LACTATE, POTASSIUM CHLORIDE, CALCIUM CHLORIDE 600; 310; 30; 20 MG/100ML; MG/100ML; MG/100ML; MG/100ML
INJECTION, SOLUTION INTRAVENOUS CONTINUOUS
Status: DISCONTINUED | OUTPATIENT
Start: 2024-06-28 | End: 2024-06-29 | Stop reason: HOSPADM

## 2024-06-28 RX ORDER — CEFAZOLIN SODIUM 2 G/100ML
2 INJECTION, SOLUTION INTRAVENOUS
Status: COMPLETED | OUTPATIENT
Start: 2024-06-28 | End: 2024-06-28

## 2024-06-28 RX ORDER — FENTANYL CITRATE 50 UG/ML
INJECTION, SOLUTION INTRAMUSCULAR; INTRAVENOUS PRN
Status: DISCONTINUED | OUTPATIENT
Start: 2024-06-28 | End: 2024-06-28

## 2024-06-28 RX ORDER — KETOROLAC TROMETHAMINE 30 MG/ML
INJECTION, SOLUTION INTRAMUSCULAR; INTRAVENOUS PRN
Status: DISCONTINUED | OUTPATIENT
Start: 2024-06-28 | End: 2024-06-28

## 2024-06-28 RX ORDER — FENTANYL CITRATE 0.05 MG/ML
50 INJECTION, SOLUTION INTRAMUSCULAR; INTRAVENOUS EVERY 5 MIN PRN
Status: DISCONTINUED | OUTPATIENT
Start: 2024-06-28 | End: 2024-06-29 | Stop reason: HOSPADM

## 2024-06-28 RX ORDER — DEXAMETHASONE SODIUM PHOSPHATE 4 MG/ML
INJECTION, SOLUTION INTRA-ARTICULAR; INTRALESIONAL; INTRAMUSCULAR; INTRAVENOUS; SOFT TISSUE PRN
Status: DISCONTINUED | OUTPATIENT
Start: 2024-06-28 | End: 2024-06-28

## 2024-06-28 RX ORDER — LIDOCAINE HYDROCHLORIDE 20 MG/ML
INJECTION, SOLUTION INFILTRATION; PERINEURAL PRN
Status: DISCONTINUED | OUTPATIENT
Start: 2024-06-28 | End: 2024-06-28

## 2024-06-28 RX ORDER — OXYCODONE HYDROCHLORIDE 5 MG/1
5 TABLET ORAL
Status: DISCONTINUED | OUTPATIENT
Start: 2024-06-28 | End: 2024-06-29 | Stop reason: HOSPADM

## 2024-06-28 RX ORDER — CEFAZOLIN SODIUM 2 G/100ML
2 INJECTION, SOLUTION INTRAVENOUS SEE ADMIN INSTRUCTIONS
Status: DISCONTINUED | OUTPATIENT
Start: 2024-06-28 | End: 2024-06-29 | Stop reason: HOSPADM

## 2024-06-28 RX ORDER — ONDANSETRON 2 MG/ML
INJECTION INTRAMUSCULAR; INTRAVENOUS PRN
Status: DISCONTINUED | OUTPATIENT
Start: 2024-06-28 | End: 2024-06-28

## 2024-06-28 RX ORDER — ACETAMINOPHEN 325 MG/1
975 TABLET ORAL ONCE
Status: COMPLETED | OUTPATIENT
Start: 2024-06-28 | End: 2024-06-28

## 2024-06-28 RX ORDER — FENTANYL CITRATE 0.05 MG/ML
25 INJECTION, SOLUTION INTRAMUSCULAR; INTRAVENOUS
Status: DISCONTINUED | OUTPATIENT
Start: 2024-06-28 | End: 2024-06-29 | Stop reason: HOSPADM

## 2024-06-28 RX ADMIN — GLYCOPYRROLATE 0.2 MG: 0.2 INJECTION, SOLUTION INTRAMUSCULAR; INTRAVENOUS at 07:55

## 2024-06-28 RX ADMIN — PROPOFOL 200 MG: 10 INJECTION, EMULSION INTRAVENOUS at 07:55

## 2024-06-28 RX ADMIN — KETOROLAC TROMETHAMINE 15 MG: 30 INJECTION, SOLUTION INTRAMUSCULAR; INTRAVENOUS at 08:20

## 2024-06-28 RX ADMIN — LIDOCAINE HYDROCHLORIDE 3 ML: 20 INJECTION, SOLUTION INFILTRATION; PERINEURAL at 07:55

## 2024-06-28 RX ADMIN — FENTANYL CITRATE 100 MCG: 50 INJECTION, SOLUTION INTRAMUSCULAR; INTRAVENOUS at 07:55

## 2024-06-28 RX ADMIN — DEXAMETHASONE SODIUM PHOSPHATE 10 MG: 4 INJECTION, SOLUTION INTRA-ARTICULAR; INTRALESIONAL; INTRAMUSCULAR; INTRAVENOUS; SOFT TISSUE at 08:02

## 2024-06-28 RX ADMIN — PROPOFOL 180 MCG/KG/MIN: 10 INJECTION, EMULSION INTRAVENOUS at 07:55

## 2024-06-28 RX ADMIN — ACETAMINOPHEN 975 MG: 325 TABLET ORAL at 06:54

## 2024-06-28 RX ADMIN — CEFAZOLIN SODIUM 2 G: 2 INJECTION, SOLUTION INTRAVENOUS at 07:48

## 2024-06-28 RX ADMIN — SODIUM CHLORIDE, SODIUM LACTATE, POTASSIUM CHLORIDE, CALCIUM CHLORIDE: 600; 310; 30; 20 INJECTION, SOLUTION INTRAVENOUS at 07:00

## 2024-06-28 RX ADMIN — ONDANSETRON 4 MG: 2 INJECTION INTRAMUSCULAR; INTRAVENOUS at 08:19

## 2024-06-28 NOTE — ANESTHESIA PREPROCEDURE EVALUATION
Anesthesia Pre-Procedure Evaluation    Patient: Tania Kaufman   MRN: [id number] : 1965        Procedure : Procedure(s):  Cystoscopy, Right Ureteroscopy, Right Holmium Laser Lithotripsy, Right Retrograde Pyelogram, Possible Right Ureteral Stent Placement          Past Medical History:   Diagnosis Date    Anxiety     Biliary colic     Diverticulitis     Gallstones     Irritable bowel syndrome with diarrhea     Kidney stone     at age 42    Obese     Paroxysmal atrial fibrillation (H) 2021    Added automatically from request for surgery [id number]    Pigmented purpuric lichenoid dermatitis of [place]ougerot and [place]     PONV (postoperative nausea and vomiting)     Thyroid disease       Past Surgical History:   Procedure Laterality Date    CARDIAC SURGERY  2019    Ablation    EP ABLATION FOCAL AFIB N/A 2022    Procedure: EP Ablation Atrial Fibrilation;  Surgeon: Seven Moreau MD;  Location:  [facility]    EYE SURGERY      Lasix    NO PAST SURGERIES      OTHER SURGICAL HISTORY      wisdom teeth removal    OK CYSTO/URETERO W/LITHOTRIPSY &INDWELL STENT INSRT Right 2019    Procedure: CYSTOSCOPY, RIGHT RETROGRADE,  RIGHT URETEROSCOPY WITH LASER LITHOTRIPSY AND STENT INSERTION;  Surgeon: John Gutierrez MD;  Location: [facility];  Service: Urology      No Known Allergies   Social History     Tobacco Use    Smoking status: Never     Passive exposure: Never    Smokeless tobacco: Never   Substance Use Topics    Alcohol use: Yes     Comment: I have maybe one glass of wine per month      Wt Readings from Last 1 Encounters:   24 84 kg (185 lb 1.6 oz)        Anesthesia Evaluation   Pt has had prior anesthetic.     History of anesthetic complications  - PONV.      ROS/MED HX  ENT/Pulmonary:  - neg pulmonary ROS     Neurologic:  - neg neurologic ROS     Cardiovascular: Comment: S/p AFib ablation, now in NSR off meds - neg cardiovascular ROS     METS/Exercise  "Tolerance: >4 METS    Hematologic:  - neg hematologic  ROS     Musculoskeletal:  - neg musculoskeletal ROS     GI/Hepatic:  - neg GI/hepatic ROS     Renal/Genitourinary:  - neg Renal ROS   (+)       Nephrolithiasis ,       Endo:  - neg endo ROS   (+)          thyroid problem, hypothyroidism,    Obesity (bmi 32),       Psychiatric/Substance Use:  - neg psychiatric ROS     Infectious Disease:  - neg infectious disease ROS     Malignancy:  - neg malignancy ROS     Other:  - neg other ROS          Physical Exam    Airway        Mallampati: II   TM distance: > 3 FB   Neck ROM: full   Mouth opening: > 3 cm    Respiratory Devices and Support         Dental       (+) Minor Abnormalities - some fillings, tiny chips      Cardiovascular   cardiovascular exam normal          Pulmonary   pulmonary exam normal                OUTSIDE LABS:  CBC:   Lab Results   Component Value Date    WBC 10.6 04/19/2024    WBC 9.6 07/12/2022    HGB 13.5 04/19/2024    HGB 13.8 04/17/2023    HCT 37.5 04/19/2024    HCT 38.4 07/12/2022     04/19/2024     07/12/2022     BMP:   Lab Results   Component Value Date     06/26/2024     04/25/2024    POTASSIUM 3.6 06/26/2024    POTASSIUM 3.5 04/25/2024    CHLORIDE 100 06/26/2024    CHLORIDE 98 04/25/2024    CO2 28 06/26/2024    CO2 30 (H) 04/25/2024    BUN 16.4 06/26/2024    BUN 12.1 04/25/2024    CR 0.85 06/26/2024    CR 0.81 04/25/2024     (H) 06/26/2024    GLC 94 04/25/2024     COAGS: No results found for: \"PTT\", \"INR\", \"FIBR\"  POC:   Lab Results   Component Value Date    HCG Negative 06/11/2019     HEPATIC:   Lab Results   Component Value Date    ALBUMIN 4.2 04/19/2024    PROTTOTAL 6.7 04/19/2024    ALT 15 04/19/2024    AST 17 04/19/2024    ALKPHOS 104 04/19/2024    BILITOTAL 0.6 04/19/2024     OTHER:   Lab Results   Component Value Date    PH 7.42 10/25/2022    A1C 5.4 02/20/2020    INDIA 9.7 06/26/2024    MAG 2.1 07/12/2022    LIPASE 35 04/19/2024    TSH 3.78 11/30/2023 " "   T4 1.50 07/12/2022       Anesthesia Plan    ASA Status:  2    NPO Status:  NPO Appropriate    Anesthesia Type: General.     - Airway: LMA   Induction: Propofol.   Maintenance: TIVA.        Consents    Anesthesia Plan(s) and associated risks, benefits, and realistic alternatives discussed. Questions answered and patient/representative(s) expressed understanding.     - Discussed:     - Discussed with:  Patient            Postoperative Care    Pain management: Multi-modal analgesia.   PONV prophylaxis: Ondansetron (or other 5HT-3), Dexamethasone or Solumedrol     Comments:    Other Comments: Reviewed anesthetic options and risks, including risk of dental trauma. Patient agrees to proceed.            Yobain Avalos MD    I have reviewed the pertinent notes and labs in the chart from the past 30 days and (re)examined the patient.  Any updates or changes from those notes are reflected in this note.             # Drug Induced Platelet Defect: home medication list includes an antiplatelet medication  # Obesity: Estimated body mass index is 32.48 kg/m  as calculated from the following:    Height as of 6/26/24: 1.608 m (5' 3.3\").    Weight as of 6/26/24: 84 kg (185 lb 1.6 oz).      "

## 2024-06-28 NOTE — DISCHARGE INSTRUCTIONS
If you have any questions or concerns regarding your procedure, please contact Dr. Salinas, his office number is [phone].      You have received 975 mg of Acetaminophen (Tylenol) at 6:54am. Please do not take an additional dose of Tylenol until after 12:54pm.     Do not exceed 4,000 mg of acetaminophen during a 24 hour period and keep in mind that acetaminophen can also be found in many over-the-counter cold medications as well as narcotics that may be given for pain.       You received a medication called Toradol (a stronger IV ibuprofen) at 8:20am. Do NOT take any Ibuprofen / Advil / Aleve / Naproxen or products containing Ibuprofen until 2:20pm or later.

## 2024-06-28 NOTE — ANESTHESIA POSTPROCEDURE EVALUATION
Patient: Tania Kaufman    Procedure: Procedure(s):  Cystoscopy, Right Ureteroscopy, Right Holmium Laser Lithotripsy, Right Retrograde Pyelogram, Right Ureteral Stent Placement       Anesthesia Type:  General    Note:  Disposition: Outpatient   Postop Pain Control: Uneventful            Sign Out: Well controlled pain   PONV: No   Neuro/Psych: Uneventful            Sign Out: Acceptable/Baseline neuro status   Airway/Respiratory: Uneventful            Sign Out: Acceptable/Baseline resp. status   CV/Hemodynamics: Uneventful            Sign Out: Acceptable CV status; No obvious hypovolemia; No obvious fluid overload   Other NRE: NONE   DID A NON-ROUTINE EVENT OCCUR? No           Last vitals:  Vitals Value Taken Time   /69 06/28/24 0845   Temp     Pulse 77 06/28/24 0845   Resp 16 06/28/24 0845   SpO2 95 % 06/28/24 0845       Electronically Signed By: Yobani Avalos MD  June 28, 2024  9:06 AM

## 2024-06-28 NOTE — OP NOTE
PREOPERATIVE DIAGNOSIS:  Right ureteral stone  POSTOPERATIVE DIAGNOSIS: Same  PROCEDURES PERFORMED:    Cystoscopy  Right ureteroscopy with holmium laser lithotripsy of ureteral stone  Right flexible ureteroscopy  Right ureteral stent placement  Right retrograde pyelogram with interpretation of imaging    STAFF SURGEON: Javier Salinas MD  RESIDENT(S) none    ANESTHESIA: General  ESTIMATED BLOOD LOSS: 1 ml  COMPLICATIONS: None  SPECIMEN: Right ureteral stone for analysis  URETERAL STENT(S): 4.8 Yoruba by 24 cm double-J stent, right kidney    SIGNIFICANT FINDINGS: Obstructing right distal ureteral stone, renal papillary plaque    BRIEF OPERATIVE INDICATIONS: Patient presented with right obstructing ureteral stone.  After a discussion of all risks, benefits, and alternatives, the patient elected to proceed with definitive stone management. The patient understands the potential need for more than one procedure to eliminate all stone burden.      DESCRIPTION OF PROCEDURE:  After informed consent was obtained, the patient was transported to the operating room & placed supine on the table. After adequate anesthesia was induced, the patient was placed in lithotomy and prepped and draped in the usual sterile fashion. A timeout was taken to confirm correct patient, procedure and laterality. Pre-operative IV antibiotics were administered.     We started the procedure by performing cystoscopy.  The bladder was anatomically unremarkable.  The right ureteral orifice was cannulated with a sensor wire.  We gently dilated the orifice with an 8 Yoruba dilator.  We passed the flexible ureteroscope adjacent to the wire and identified the obstructing stone.  We managed it backwards into the dilated segment of ureter.  The tissue at the level of the impaction site was mildly edematous and irritated.  We used the 200  m holmium laser to fragment the stone into sufficiently small pieces such that they were able to be flushed out from  the ureter.  We sent a representative fragment for analysis.  We then passed the flexible ureteroscope adjacent to the wire and passed it all the way to the kidney.  The ureter was patent without any evidence of ureteroscopic injury.  The kidney itself had evidence of Mann's papillary plaque in several of the papilla.  We did not identify any concerning stones.  I would not be surprised to see some calcification on her postoperative CT scan likely consistent with papillary mineralization.  Retrograde pyelogram showed mild hydronephrosis without any evidence of extravasation.  Pullback ureteroscopy was unremarkable.  We placed a 4 Persian by 24 cm double-J stent.  We left a string on the stent to facilitate removal.    The bladder was drained and the patient was awoken from anesthesia and transported to the postanesthesia care unit in stable condition.     POSTOP PLAN:  -Stent removal via string end of next week.  -Follow-up with renal ultrasound in 6 to 8 weeks

## 2024-06-28 NOTE — PROGRESS NOTES
I personally met with Tania Kaufman and discussed their current medical situation.     We reviewed the nature of planned surgery, the risks benefits and complications and treatment alternatives.      Shared decision made to proceed with right ureteroscopic stone treatment    We also reviewed the following financial disclosures potentially applicable to their surgery  I informed the patient that I do have a financial consulting relationship with [employer], a medical device company that makes products which could be used during the procedure  I presented an opportunity to ask questions  and informed them that they were capable of rescinding their consent or not proceeding without penalty if they desire to do so.

## 2024-06-28 NOTE — ANESTHESIA PROCEDURE NOTES
Airway       Patient location during procedure: OR  Staff -        Anesthesiologist:  Yobani Avalos MD       CRNA: Olivia Wagner APRN CRNA       Performed By: CRNAIndications and Patient Condition       Indications for airway management: sia-procedural       Induction type:intravenous       Mask difficulty assessment: 1 - vent by mask    Final Airway Details       Final airway type: supraglottic airway    Supraglottic Airway Details        Type: LMA       LMA size: 4    Post intubation assessment        Placement verified by: capnometry, equal breath sounds and chest rise        Number of attempts at approach: 1       Secured with: tape       Ease of procedure: easy       Dentition: Intact and Unchanged

## 2024-06-28 NOTE — ANESTHESIA CARE TRANSFER NOTE
Patient: Tania Kaufman    Procedure: Procedure(s):  Cystoscopy, Right Ureteroscopy, Right Holmium Laser Lithotripsy, Right Retrograde Pyelogram, Right Ureteral Stent Placement       Diagnosis: Right ureteral stone [N20.1]  Diagnosis Additional Information: No value filed.    Anesthesia Type:   General     Note:    Oropharynx: oropharynx clear of all foreign objects and spontaneously breathing  Level of Consciousness: drowsy  Oxygen Supplementation: face mask  Level of Supplemental Oxygen (L/min / FiO2): 6  Independent Airway: airway patency satisfactory and stable  Dentition: dentition unchanged  Vital Signs Stable: post-procedure vital signs reviewed and stable  Report to RN Given: handoff report given  Patient transferred to: PACU    Handoff Report: Identifed the Patient, Identified the Reponsible Provider, Reviewed the pertinent medical history, Discussed the surgical course, Reviewed Intra-OP anesthesia mangement and issues during anesthesia, Set expectations for post-procedure period and Allowed opportunity for questions and acknowledgement of understanding      Vitals:  Vitals Value Taken Time   /60 06/28/24 0830   Temp 97.2 06/28/24 0827   Pulse 82 06/28/24 0830   Resp 16 06/28/24 0827   SpO2 100 % 06/28/24 0830       Electronically Signed By: TAMIA Workman CRNA  June 28, 2024  8:32 AM

## 2024-07-01 ENCOUNTER — NURSE TRIAGE (OUTPATIENT)
Dept: UROLOGY | Facility: CLINIC | Age: 59
End: 2024-07-01
Payer: COMMERCIAL

## 2024-07-01 LAB
APPEARANCE STONE: NORMAL
COMPN STONE: NORMAL
SPECIMEN WT: 5 MG

## 2024-07-01 NOTE — TELEPHONE ENCOUNTER
"Caller reporting the following red-flag post-op symptom(s): Dark red/brown urine. Feels \"off\", symptoms have been getting worse overtime.    Per the system red-flag symptom policy, patient was instructed to:  speak with a Registered Nurse    Action:  Patient warm transferred to a Registered Nurse   "

## 2024-07-01 NOTE — TELEPHONE ENCOUNTER
58 year old s/p Cystoscopy.Right ureteroscopy with holmium laser lithotripsy of ureteral stone Right flexible ureteroscopy,Right ureteral stent placement and Right retrograde pyelogram with interpretation of imaging on 6/28/24  She is calling because she has felt light headed and nauseous off and on since surgery.  She is concerned it is due to oxybutynin and pyridium   She has not taken them today  She states her pain is controlled   She denies fever   Her urine is brown in color   She only drinks water and feels she is drinking an adequate amount     Explained the urine is likely brown due to some blood and the pyridium which colors urine orange   Encouraging her to push fluids   Will forward to clinic to follow-up   She would like to speak to Dr Wasserman because she did not speak to him after surgery                    Reason for Disposition   Patient wants to be seen    Additional Information   Negative: Sounds like a life-threatening emergency to the triager   Negative: Chest pain   Negative: Difficulty breathing   Negative: Acting confused (e.g., disoriented, slurred speech) or excessively sleepy   Negative: Surgical incision symptoms and questions   Negative: Pain or burning with passing urine (urination) and male   Negative: Pain or burning with passing urine (urination) and female   Negative: Constipation   Negative: New or worsening leg (calf, thigh) pain   Negative: New or worsening leg swelling   Negative: Dizziness is severe, or persists > 24 hours after surgery   Negative: Symptoms arising from use of a urinary catheter (Cali or Coude)   Negative: Cast problems or questions   Negative: Medication question   Negative: Bright red, wide-spread, sunburn-like rash   Negative: SEVERE headache and after spinal (epidural) anesthesia   Negative: Vomiting and persists > 4 hours   Negative: Vomiting and abdomen looks much more swollen than usual   Negative: Drinking very little and dehydration suspected (e.g.,  "no urine > 12 hours, very dry mouth, very lightheaded)   Negative: Patient sounds very sick or weak to the triager   Negative: Sounds like a serious complication to the triager   Negative: Fever > 100.4 F (38.0 C)   Negative: Caller has URGENT question and triager unable to answer question   Negative: SEVERE post-op pain (e.g., excruciating, pain scale 8-10) that is not controlled with pain medications   Negative: Headache and after spinal (epidural) anesthesia and not severe   Negative: Fever present > 3 days (72 hours)    Answer Assessment - Initial Assessment Questions  1. SYMPTOM: \"What's the main symptom you're concerned about?\" (e.g., pain, fever, vomiting)      Lightheaded nauseous   2. ONSET: \"When did symptoms  start?\"      Off and on since surgery  worse today  3. SURGERY: \"What surgery did you have?\"      1. Cystoscopy  2. Right ureteroscopy with holmium laser lithotripsy of ureteral stone  3. Right flexible ureteroscopy  4. Right ureteral stent placement  5. Right retrograde pyelogram with interpretation of imaging    4. DATE of SURGERY: \"When was the surgery?\"       6/28/24  5. ANESTHESIA: \" What type of anesthesia did you have?\" (e.g., general, spinal, epidural, local)      gen  6. PAIN: \"Is there any pain?\" If Yes, ask: \"How bad is it?\"  (Scale 1-10; or mild, moderate, severe)      Controlled   7. FEVER: \"Do you have a fever?\" If Yes, ask: \"What is your temperature, how was it measured, and when did it start?\"      no  8. VOMITING: \"Is there any vomiting?\" If Yes, ask: \"How many times?\"      no  9. BLEEDING: \"Is there any bleeding?\" If Yes, ask: \"How much?\" and \"Where?\"      Urine is brown  10. OTHER SYMPTOMS: \"Do you have any other symptoms?\" (e.g., drainage from wound, painful urination, constipation)        Nauseous   feeling off    Protocols used: Post-Op Symptoms and [id number]    "

## 2024-07-05 ENCOUNTER — MYC MEDICAL ADVICE (OUTPATIENT)
Dept: FAMILY MEDICINE | Facility: CLINIC | Age: 59
End: 2024-07-05
Payer: COMMERCIAL

## 2024-07-05 NOTE — TELEPHONE ENCOUNTER
[facility] Call Center    Phone Message Patient relations, Jerry called on behalf of patient. Please contact patient regarding her previous questions/MyChart message.     May a detailed message be left on voicemail: n/a    Reason for Call: Other: Patient Relations Follow Up     Action Taken: [facility],     Travel Screening: Not Applicable

## 2024-07-05 NOTE — TELEPHONE ENCOUNTER
Spoke with patient over the phone and discussed stent removal process.  See other telephone encounter.  Shannon Campos RN

## 2024-07-05 NOTE — TELEPHONE ENCOUNTER
Spoke with patient regarding stent on a string removal at home.  Patient understands she may have pain an hour or so after the stent is removed.  She will take her medications and will call with any issues or questions.  Direct number given for nurse.  Shannon Campos RN

## 2024-07-05 NOTE — LETTER
July 8, 2024      Tania Kaufman  [address] [address] [facility] [address]        To Whom It May Concern:    Tania Kaufman was seen in our clinic. She may return to work 7/8/2024 without restrictions.      Sincerely,        Lashonda Arango MD

## 2024-07-09 DIAGNOSIS — N20.1 RIGHT URETERAL STONE: ICD-10-CM

## 2024-07-10 RX ORDER — OXYBUTYNIN CHLORIDE 5 MG/1
5 TABLET, EXTENDED RELEASE ORAL DAILY
Qty: 14 TABLET | Refills: 0 | OUTPATIENT
Start: 2024-07-10

## 2024-07-11 ENCOUNTER — MYC MEDICAL ADVICE (OUTPATIENT)
Dept: FAMILY MEDICINE | Facility: CLINIC | Age: 59
End: 2024-07-11
Payer: COMMERCIAL

## 2024-07-11 ENCOUNTER — MYC REFILL (OUTPATIENT)
Dept: FAMILY MEDICINE | Facility: CLINIC | Age: 59
End: 2024-07-11
Payer: COMMERCIAL

## 2024-07-11 DIAGNOSIS — R11.0 NAUSEA: ICD-10-CM

## 2024-07-11 RX ORDER — DIMENHYDRINATE 50 MG
50 TABLET ORAL EVERY 6 HOURS PRN
Qty: 30 TABLET | Refills: 1 | Status: SHIPPED | OUTPATIENT
Start: 2024-07-11 | End: 2024-08-01

## 2024-07-11 RX ORDER — ONDANSETRON 4 MG/1
4 TABLET, ORALLY DISINTEGRATING ORAL EVERY 8 HOURS PRN
Qty: 30 TABLET | Refills: 0 | Status: SHIPPED | OUTPATIENT
Start: 2024-07-11 | End: 2024-09-20

## 2024-07-15 DIAGNOSIS — N20.1 RIGHT URETERAL STONE: Primary | ICD-10-CM

## 2024-07-20 ENCOUNTER — HEALTH MAINTENANCE LETTER (OUTPATIENT)
Age: 59
End: 2024-07-20

## 2024-08-01 ENCOUNTER — OFFICE VISIT (OUTPATIENT)
Dept: FAMILY MEDICINE | Facility: CLINIC | Age: 59
End: 2024-08-01
Attending: FAMILY MEDICINE
Payer: COMMERCIAL

## 2024-08-01 VITALS
DIASTOLIC BLOOD PRESSURE: 80 MMHG | RESPIRATION RATE: 14 BRPM | SYSTOLIC BLOOD PRESSURE: 98 MMHG | OXYGEN SATURATION: 96 % | HEART RATE: 84 BPM | BODY MASS INDEX: 31.77 KG/M2 | HEIGHT: 64 IN | WEIGHT: 186.1 LBS

## 2024-08-01 DIAGNOSIS — Z98.890 S/P ABLATION OF ATRIAL FIBRILLATION: ICD-10-CM

## 2024-08-01 DIAGNOSIS — E03.9 HYPOTHYROIDISM, UNSPECIFIED TYPE: ICD-10-CM

## 2024-08-01 DIAGNOSIS — E66.9 MILD OBESITY: ICD-10-CM

## 2024-08-01 DIAGNOSIS — Z12.31 ENCOUNTER FOR SCREENING MAMMOGRAM FOR BREAST CANCER: ICD-10-CM

## 2024-08-01 DIAGNOSIS — Z00.00 ROUTINE GENERAL MEDICAL EXAMINATION AT A HEALTH CARE FACILITY: Primary | ICD-10-CM

## 2024-08-01 DIAGNOSIS — K58.0 IRRITABLE BOWEL SYNDROME WITH DIARRHEA: ICD-10-CM

## 2024-08-01 DIAGNOSIS — Z86.79 S/P ABLATION OF ATRIAL FIBRILLATION: ICD-10-CM

## 2024-08-01 LAB
HBA1C MFR BLD: 5.3 % (ref 0–5.6)
HGB BLD-MCNC: 13.4 G/DL (ref 11.7–15.7)

## 2024-08-01 PROCEDURE — 80061 LIPID PANEL: CPT | Performed by: FAMILY MEDICINE

## 2024-08-01 PROCEDURE — 90715 TDAP VACCINE 7 YRS/> IM: CPT | Performed by: FAMILY MEDICINE

## 2024-08-01 PROCEDURE — 84443 ASSAY THYROID STIM HORMONE: CPT | Performed by: FAMILY MEDICINE

## 2024-08-01 PROCEDURE — 99396 PREV VISIT EST AGE 40-64: CPT | Mod: 25 | Performed by: FAMILY MEDICINE

## 2024-08-01 PROCEDURE — 83036 HEMOGLOBIN GLYCOSYLATED A1C: CPT | Performed by: FAMILY MEDICINE

## 2024-08-01 PROCEDURE — 99213 OFFICE O/P EST LOW 20 MIN: CPT | Mod: 25 | Performed by: FAMILY MEDICINE

## 2024-08-01 PROCEDURE — 36415 COLL VENOUS BLD VENIPUNCTURE: CPT | Performed by: FAMILY MEDICINE

## 2024-08-01 PROCEDURE — 90471 IMMUNIZATION ADMIN: CPT | Performed by: FAMILY MEDICINE

## 2024-08-01 PROCEDURE — 85018 HEMOGLOBIN: CPT | Performed by: FAMILY MEDICINE

## 2024-08-01 SDOH — HEALTH STABILITY: PHYSICAL HEALTH: ON AVERAGE, HOW MANY DAYS PER WEEK DO YOU ENGAGE IN MODERATE TO STRENUOUS EXERCISE (LIKE A BRISK WALK)?: 7 DAYS

## 2024-08-01 ASSESSMENT — SOCIAL DETERMINANTS OF HEALTH (SDOH): HOW OFTEN DO YOU GET TOGETHER WITH FRIENDS OR RELATIVES?: ONCE A WEEK

## 2024-08-01 NOTE — PATIENT INSTRUCTIONS
For Calcium Kidney stones:  1. Eat less sodium/lower sodium diet.    2. Do not eat excessive amount of animal protein (can aim for about 3 oz of lean protein at meals and and can try some plant-based proteins such as beans).    3. Can aim for ~2-3 higher calcium/low fat dairy servings per day.  (Ie, you don't need to be on a LOW calcium diet; rather having enough calcium but not too much is the goal)    4. Drink at least 64 oz water per day.    5.  Reducing intake of phosphorus should also be helpful, but there are no studies that have specifically addressed this.     6. We can use other forms of medication if needed (like a diuretic) down the road.     7. If you make calcium oxalate specific stones:   Limit the amount of high oxalate foods such as spinach, peanuts, nuts rhubarb, sweet potatoes, chocolate, tofu in diet. Note okay to eat some and do not need to avoid completely.    -------------------------------    There is a new shingles vaccine that is 97% effective. It is the Shingrix vaccine and is recommended for those 50 and older. We recommend the vaccine even if you have had shingles or the older vaccine against shingles- Zostavax. Insurance coverage for the vaccine varies. I recommend you check with your insurance to verify if, when and where it is covered. The vaccine is covered by most commercial insurance but Medicare does not cover the vaccine. It may be covered by Medicare Part D (your drug/pharmacy plan) and sometimes it is covered only at a pharmacy instead of here in the clinic. If it is covered in the clinic, you may schedule a nurse visit anytime to get the first dose of the vaccine. The second dose is recommended two months after the first and should be gotten by 6 months after the first.   About 10% of people will get a sore, red reaction at the site of the injection. Some people may also feel a little sick or nauseated after the injection. This may happen with either the first and/or second  vaccine.

## 2024-08-01 NOTE — PROGRESS NOTES
"Preventive Care Visit  [facility]  Lashonda Arango MD, Family Medicine  Aug 1, 2024      Assessment & Plan     Routine general medical examination at a health care facility  Doing well at this tie  - PRIMARY CARE FOLLOW-UP SCHEDULING; Future  - Hemoglobin A1c; Future  - Hemoglobin; Future  - Lipid panel reflex to direct LDL Non-fasting; Future    Hx kidney stones  Followed by urology, we spent time today to review her op note and answer questions regarding the surgery that she had as well as review stone analysis showing calcium oxalate and calcium phosphate crystal and the stone that was retrieved.  She is going to follow-up yet with the urologist and follow-up ultrasound scan.  - Continues chlorthalidone with K supplementation    Mild obesity  Risks of obesity were discussed, including co-morbidities such as diabetes, HTN, HLD, CAD and stroke.   - encouraged moderate physical activity of 150 minutes per week  - encouraged healthy dietary choices like eating real foods, increasing protein & vegetables, and watching portion sizes.    Irritable bowel syndrome with diarrhea  Stable, reviewed use of IBGard as supportive supplementation    Hypothyroidism, unspecified type  Stable, not on medications but we do continue to monitor her thyroid levels  - TSH with free T4 reflex; Future    S/P ablation of atrial fibrillation  No recurrence, continues baby aspirin    Encounter for screening mammogram for breast cancer  Due December 2024  - MA Screen Bilateral w/Rashaad; Future            BMI  Estimated body mass index is 31.94 kg/m  as calculated from the following:    Height as of this encounter: 1.626 m (5' 4\").    Weight as of this encounter: 84.4 kg (186 lb 1.6 oz).       Counseling  Appropriate preventive services were addressed with this patient via screening, questionnaire, or discussion as appropriate for fall prevention, nutrition, physical activity, Tobacco-use cessation, weight loss " and cognition.  Checklist reviewing preventive services available has been given to the patient.  Reviewed patient's diet, addressing concerns and/or questions.           Tony Harmon is a 58 year old, presenting for the following:  Physical (Follow up on Kidney stone surgery 1 month ago in [place]- Non- [facility])        8/1/2024     1:52 PM   Additional Questions   Roomed by Afsaneh LIZ MA        Health Care Directive  Patient does not have a Health Care Directive or Living Will: Discussed advance care planning with patient; information given to patient to review.    HPI  Chief Complaint   Patient presents with    Physical     Follow up on Kidney stone surgery 1 month ago in [place]- Non- fasting       Recent cystoscopy, right uteroscopy and stone removal on 6/28/2024. Post op she was given oxybutynin which helped her post op urinary burning sensation; she was also given pyridium for spasm  Stone analysis was calcium oxalate and 10% with calcium phosphate  Nausea was markedly better the day after    Has been chlorthalidone and K resupplementation     Patient's last menstrual period was 10/31/2018 (approximate).  Postmenopausal    Walking 12K steps and exercising now that nausea is better    IBS-D - not optimal; we talked about IBGard    Paps have been done with ob/gyn historically; last done 3/2023 and normal/neg HPV by her report. We talked about how this would be a  5 yr follow up    Social History     Social History Narrative         2 children (senior high school 2023, daughter 19yo swims in college)    Working full time from home as a     Walking 5-8miles per day (1.5hr in AM and PM)    Lashonda Arango MD                   8/1/2024   General Health   How would you rate your overall physical health? Good   Feel stress (tense, anxious, or unable to sleep) Not at all            8/1/2024   Nutrition   Three or more servings of calcium each day? Yes   Diet: Regular (no restrictions)    How many servings of fruit and vegetables per day? (!) 2-3   How many sweetened beverages each day? 0-1            8/1/2024   Exercise   Days per week of moderate/strenous exercise 7 days            8/1/2024   Social Factors   Frequency of gathering with friends or relatives Once a week   Worry food won't last until get money to buy more No   Food not last or not have enough money for food? No   Do you have housing? (Housing is defined as stable permanent housing and does not include staying ouside in a car, in a tent, in an abandoned building, in an overnight shelter, or couch-surfing.) Yes   Are you worried about losing your housing? No   Lack of transportation? No   Unable to get utilities (heat,electricity)? No            8/1/2024   Fall Risk   Fallen 2 or more times in the past year? No   Trouble with walking or balance? No             8/1/2024   Dental   Dentist two times every year? Yes            8/1/2024   TB Screening   Were you born outside of the [place]? No              Today's PHQ-2 Score:       6/5/2024    12:16 PM   PHQ-2 ( 1999 [employer])   Q1: Little interest or pleasure in doing things 0   Q2: Feeling down, depressed or hopeless 0   PHQ-2 Score 0         8/1/2024   Substance Use   Alcohol more than 3/day or more than 7/wk Not Applicable   Do you use any other substances recreationally? No        Social History     Tobacco Use    Smoking status: Never     Passive exposure: Never    Smokeless tobacco: Never   Vaping Use    Vaping status: Never Used   Substance Use Topics    Alcohol use: Yes     Comment: I have maybe one glass of wine per month    Drug use: No           12/8/2022   LAST FHS-7 RESULTS   1st degree relative breast or ovarian cancer No   Any relative bilateral breast cancer No   Any male have breast cancer No   Any ONE woman have BOTH breast AND ovarian cancer No   Any woman with breast cancer before 50yrs Yes   2 or more relatives with breast AND/OR ovarian cancer No   2 or more relatives  "with breast AND/OR bowel cancer No                     8/1/2024   One time HIV Screening   Previous HIV test? No          8/1/2024   STI Screening   New sexual partner(s) since last STI/HIV test? No        History of abnormal Pap smear: No - age 30- 64 PAP with HPV every 5 years recommended       ASCVD Risk   The ASCVD Risk score (Wayne DAVIS, et al., 2019) failed to calculate for the following reasons:    Cannot find a previous HDL lab    Cannot find a previous total cholesterol lab           Reviewed and updated as needed this visit by Provider   Tobacco  Allergies  Meds  Problems  Med Hx  Surg Hx  Fam Hx                     Objective    Exam  BP 98/80 (BP Location: Right arm, Patient Position: Sitting, Cuff Size: Adult Large)   Pulse 84   Resp 14   Ht 1.626 m (5' 4\")   Wt 84.4 kg (186 lb 1.6 oz)   LMP 10/31/2018 (Approximate)   SpO2 96%   BMI 31.94 kg/m     Estimated body mass index is 31.94 kg/m  as calculated from the following:    Height as of this encounter: 1.626 m (5' 4\").    Weight as of this encounter: 84.4 kg (186 lb 1.6 oz).    Physical Exam  GENERAL: alert and no distress  EYES: Eyes grossly normal to inspection, PERRL and conjunctivae and sclerae normal  HENT: ear canals and TM's normal, nose and mouth without ulcers or lesions  NECK: no adenopathy, no asymmetry, masses, or scars  RESP: lungs clear to auscultation - no rales, rhonchi or wheezes  CV: regular rate and rhythm, normal S1 S2, no S3 or S4, no murmur, click or rub, no peripheral edema  ABDOMEN: soft, nontender, no hepatosplenomegaly, no masses and bowel sounds normal  MS: no gross musculoskeletal defects noted, no edema  SKIN: no suspicious lesions or rashes  NEURO: Normal strength and tone, mentation intact and speech normal  PSYCH: mentation appears normal, affect normal/bright        Signed Electronically by: Lashonda Arango MD    "

## 2024-08-02 LAB
CHOLEST SERPL-MCNC: 209 MG/DL
FASTING STATUS PATIENT QL REPORTED: ABNORMAL
HDLC SERPL-MCNC: 90 MG/DL
LDLC SERPL CALC-MCNC: 67 MG/DL
NONHDLC SERPL-MCNC: 119 MG/DL
TRIGL SERPL-MCNC: 261 MG/DL
TSH SERPL DL<=0.005 MIU/L-ACNC: 2.27 UIU/ML (ref 0.3–4.2)

## 2024-08-07 ENCOUNTER — HOSPITAL ENCOUNTER (OUTPATIENT)
Dept: MAMMOGRAPHY | Facility: CLINIC | Age: 59
Discharge: HOME | End: 2024-08-07
Attending: FAMILY MEDICINE | Admitting: FAMILY MEDICINE
Payer: COMMERCIAL

## 2024-08-07 DIAGNOSIS — Z12.31 ENCOUNTER FOR SCREENING MAMMOGRAM FOR BREAST CANCER: ICD-10-CM

## 2024-08-07 PROCEDURE — 77063 BREAST TOMOSYNTHESIS BI: CPT

## 2024-08-22 ENCOUNTER — HOSPITAL ENCOUNTER (OUTPATIENT)
Dept: ULTRASOUND IMAGING | Facility: CLINIC | Age: 59
Discharge: HOME | End: 2024-08-22
Attending: UROLOGY | Admitting: UROLOGY
Payer: COMMERCIAL

## 2024-08-22 DIAGNOSIS — N20.1 RIGHT URETERAL STONE: ICD-10-CM

## 2024-08-22 PROCEDURE — 76770 US EXAM ABDO BACK WALL COMP: CPT

## 2024-08-23 ENCOUNTER — VIRTUAL VISIT (OUTPATIENT)
Dept: UROLOGY | Facility: CLINIC | Age: 59
End: 2024-08-23
Payer: COMMERCIAL

## 2024-08-23 DIAGNOSIS — N20.0 NEPHROLITHIASIS: ICD-10-CM

## 2024-08-23 DIAGNOSIS — R82.994 HYPERCALCIURIA: Primary | ICD-10-CM

## 2024-08-23 DIAGNOSIS — N20.0 CALCIUM OXALATE KIDNEY STONES: ICD-10-CM

## 2024-08-23 PROCEDURE — 99213 OFFICE O/P EST LOW 20 MIN: CPT | Mod: 95 | Performed by: NURSE PRACTITIONER

## 2024-08-23 RX ORDER — CHLORTHALIDONE 25 MG/1
12.5 TABLET ORAL DAILY
Qty: 60 TABLET | Refills: 3 | Status: SHIPPED | OUTPATIENT
Start: 2024-08-23

## 2024-08-23 RX ORDER — POTASSIUM CITRATE 10 MEQ/1
20 TABLET, EXTENDED RELEASE ORAL 2 TIMES DAILY WITH MEALS
Qty: 240 TABLET | Refills: 1 | Status: SHIPPED | OUTPATIENT
Start: 2024-08-23

## 2024-08-23 ASSESSMENT — PAIN SCALES - GENERAL: PAINLEVEL: NO PAIN (0)

## 2024-08-23 NOTE — NURSING NOTE
Current patient location: Patient declined to provide     Is the patient currently in the state of [place]? YES    Visit mode:VIDEO    If the visit is dropped, the patient can be reconnected by: VIDEO VISIT: Text to cell phone:   Telephone Information:   Mobile [phone]       Will anyone else be joining the visit? NO  (If patient encounters technical issues they should call [phone])    How would you like to obtain your AVS? MyChart    Are changes needed to the allergy or medication list? No    Are refills needed on medications prescribed by this physician? NO    Rooming Documentation:  Not applicable      Reason for visit: RECHECK (2 months post op with EFRAIN 8/22/)    Olivia HERNANDESF

## 2024-08-23 NOTE — PROGRESS NOTES
Urology Video Office Visit    Video-Visit Details    Type of service:  Video Visit    Video Start Time: 1357    Video End Time: 1413    Originating Location (pt. Location): Home    Distant Location (provider location):  On-site     Platform used for Video Visit: [employer]           Assessment and Plan:     Assessment: 58 year old female with CaOx/CaP stones.     Plan:  -Reviewed renal US with patient. No noted hydronephrosis or nephrolithiasis.   -Discussed option of repeat 24 hour urine study. Pt prefers to hold off at this time.   -Please continue with chlorthalidone 12.5mg PO daily and potassium citrate powder. She would like an option of a potassium citrate pill. Recommend potassium citrate 20mEq BID. Discussed r/b/a of pill vs powder. Recommend if starting on pill to have serum potassium level drawn 1-2 weeks after starting medication. Pt verbalized understanding and will notify Urology if she changes form of medication.   -RTC in 1 year with CT scan or sooner MARIS Velez CNP  Department of Urology  August 23, 2024    I spent a total of 25 minutes spent on the date of the encounter doing chart review, history and exam, documentation, and further activities as noted above.          Chief Complaint:   Post-op follow up         History of Present Illness:    Tania Kaufman is a pleasant 58 year old female who presents with concerns of a post-op follow up.     Ms. Kaufman had a right URS/LL on 6/28/24 with Dr. King for a right obstructing renal stone. Mann's papillary plaque in several of the papilla. Stent removal via string by patient without complications.    Stone Analysis:   80% calcium oxalate monohydrate,   10% calcium oxalate dihydrate, and   10% calcium phosphate (hydroxy- and carbonate- apatite)     She is doing well today. Denies any ongoing flank pain, f/c/n/v, gross hematuria, or dysuria.     Renal US on 08/22/24 (images personally reviewed) revealed bilateral symmetrical kidneys  without hydronephrosis or nephrolithiasis.     She is currently on chlorthalidone 12.5mg and potassium citrate powder at this time.            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     PONV (postoperative nausea and vomiting)     Thyroid disease             Past Surgical History:     Past Surgical History:   Procedure Laterality Date    CARDIAC SURGERY  7/2019    Ablation    COMBINED CYSTOSCOPY, RETROGRADES, URETEROSCOPY, LASER HOLMIUM LITHOTRIPSY URETER(S), INSERT STENT Right 6/28/2024    Procedure: Cystoscopy, Right Ureteroscopy, Right Holmium Laser Lithotripsy, Right Retrograde Pyelogram, Right Ureteral Stent Placement;  Surgeon: Javier Salinas MD;  Location: [facility]    EP ABLATION FOCAL AFIB N/A 06/29/2022    Procedure: EP Ablation Atrial Fibrilation;  Surgeon: Seven Moreau MD;  Location: [facility] CARDIAC CATH LAB    EYE SURGERY  2005    Lasix    NO PAST SURGERIES      OTHER SURGICAL HISTORY      wisdom teeth removal    AR CYSTO/URETERO W/LITHOTRIPSY &INDWELL STENT INSRT Right 06/11/2019    Procedure: CYSTOSCOPY, RIGHT RETROGRADE,  RIGHT URETEROSCOPY WITH LASER LITHOTRIPSY AND STENT INSERTION;  Surgeon: John Gutierrez MD;  Location: [facility];  Service: Urology            Medications     Current Outpatient Medications   Medication Sig Dispense Refill    aspirin (ASA) 81 MG EC tablet Take 1 tablet (81 mg) by mouth daily      chlorthalidone (HYGROTON) 25 MG tablet Take 0.5 tablets (12.5 mg) by mouth daily 90 tablet 1    L.ACID/L.CASEI/B.BIF/B.LUKE/FOS (PROBIOTIC BLEND ORAL) Take 1 tablet by mouth 2 times daily as needed       melatonin 3  mg Tab tablet [MELATONIN 3 MG TAB TABLET] Take 3 mg by mouth bedtime as needed.      multivitamin therapeutic (THERAGRAN) tablet [MULTIVITAMIN THERAPEUTIC (THERAGRAN) TABLET] Take 1 tablet by mouth daily.      NON FORMULARY 2 times daily Vitamins - Melaleuca (fiber-wise supplement)      ondansetron (ZOFRAN ODT) 4 MG ODT tab Take 1 tablet (4 mg) by mouth every 8 hours as needed for nausea 30 tablet 0    POTASSIUM PO Take 2 Scoops by mouth 2 times daily Dr. Mcadams electrolyte powder: contains other electrolytes but has 1 g of K-citrate per scoop.       No current facility-administered medications for this visit.            Family History:     Family History   Problem Relation Age of Onset    Cancer Mother         skin    Pacemaker Mother         4 open heart surgeries     Diverticulitis Father     Breast Cancer Other 40        great aunt    Urolithiasis Brother         recurrent    Gout No family hx of             Social History:     Social History     Socioeconomic History    Marital status:      Spouse name: Not on file    Number of children: Not on file    Years of education: Not on file    Highest education level: Not on file   Occupational History    Not on file   Tobacco Use    Smoking status: Never     Passive exposure: Never    Smokeless tobacco: Never   Vaping Use    Vaping status: Never Used   Substance and Sexual Activity    Alcohol use: Yes     Comment: I have maybe one glass of wine per month    Drug use: No    Sexual activity: Not Currently     Partners: Male     Birth control/protection: Post-menopausal   Other Topics Concern    Parent/sibling w/ CABG, MI or angioplasty before 65F 55M? Yes     Comment: Brother   Social History Narrative         2 children (senior high school 2023, daughter 21yo swims in college)    Working full time from home as a     Walking 5-8miles per day (1.5hr in AM and PM)    Lashonda Arango MD     Social Determinants of Health     Financial Resource  Strain: Low Risk  (8/1/2024)    Financial Resource Strain     Within the past 12 months, have you or your family members you live with been unable to get utilities (heat, electricity) when it was really needed?: No   Food Insecurity: Low Risk  (8/1/2024)    Food Insecurity     Within the past 12 months, did you worry that your food would run out before you got money to buy more?: No     Within the past 12 months, did the food you bought just not last and you didn t have money to get more?: No   Transportation Needs: Low Risk  (8/1/2024)    Transportation Needs     Within the past 12 months, has lack of transportation kept you from medical appointments, getting your medicines, non-medical meetings or appointments, work, or from getting things that you need?: No   Physical Activity: Unknown (8/1/2024)    Exercise Vital Sign     Days of Exercise per Week: 7 days     Minutes of Exercise per Session: Not on file   Stress: No Stress Concern Present (8/1/2024)    [employer] [place] [employer] - Occupational Stress Questionnaire     Feeling of Stress : Not at all   Social Connections: Unknown (8/1/2024)    Social Connection and Isolation Panel [NHANES]     Frequency of Communication with Friends and Family: Not on file     Frequency of Social Gatherings with Friends and Family: Once a week     Attends Baptism Services: Not on file     Active Member of Clubs or Organizations: Not on file     Attends Club or Organization Meetings: Not on file     Marital Status: Not on file   Interpersonal Safety: Low Risk  (8/1/2024)    Interpersonal Safety     Do you feel physically and emotionally safe where you currently live?: Yes     Within the past 12 months, have you been hit, slapped, kicked or otherwise physically hurt by someone?: No     Within the past 12 months, have you been humiliated or emotionally abused in other ways by your partner or ex-partner?: No   Housing Stability: Low Risk  (8/1/2024)    Housing  Stability     Do you have housing? : Yes     Are you worried about losing your housing?: No            Allergies:   Patient has no known allergies.         Review of Systems:  From intake questionnaire   Negative 14 system review except as noted on HPI, nurse's note.         Physical Exam:   General Appearance: Well groomed, hygenic  Eyes: No redness, discharge  Respiratory: No cough, no respiratory distress or labored breathing  Musculoskeletal: Grossly normal, full range of motion in upper extremities, no gross deficits  Skin: No discoloration or apparent rashes  Neurologic - No tremors  Psychiatric - Alert and oriented  The rest of a comprehensive physical examination is deferred due to video visit restrictions        Labs:    I personally reviewed all applicable laboratory data and went over findings with patient  Significant for:    CBC RESULTS:  Recent Labs   Lab Test 08/01/24  1446 04/19/24  2118 04/17/23  1559 07/12/22  0326 06/29/22  0559 06/21/22  1407   WBC  --  10.6  --  9.6 5.3 5.8   HGB 13.4 13.5 13.8 12.5 14.0 14.2   PLT  --  230  --  278 222 227        BMP RESULTS:  Recent Labs   Lab Test 06/26/24  0912 04/25/24  1453 04/19/24  2118 11/30/23  1239 06/21/22  1407 06/22/21  1501 02/20/20  0823 07/03/19  2355 06/03/19  1630    137 131* 140   < > 140 140 139 139   POTASSIUM 3.6 3.5 2.5* 3.2*   < > 4.3 4.3 4.1 3.6   CHLORIDE 100 98 95* 102   < > 105 107 108* 105   CO2 28 30* 20* 30*   < > 24 25 17* 27   ANIONGAP 9 9 16* 8   < > 11 8 14 7   * 94 133* 94   < > 107 133* 165* 97   BUN 16.4 12.1 13.6 10.4   < > 11 11 12 14   CR 0.85 0.81 0.61 0.74   < > 0.80 0.88 1.01 0.77   GFRESTIMATED 80 85 >90 >90   < > >60 >60 58* >60   GFRESTBLACK  --   --   --   --   --  >60 >60 >60 >60   INDIA 9.7 9.5 9.4 9.7   < > 9.7 9.2 9.7 9.6    < > = values in this interval not displayed.       UA RESULTS:   Recent Labs   Lab Test 06/26/24  0920 04/19/24  2241 08/01/23  1050   SG 1.010 <1.005 <=1.005   URINEPH 7.0 7.0  6.5   NITRITE Negative Negative Negative   RBCU 0-2 1 0-2   WBCU 0-5 1 0-5       CALCIUM RESULTS  Lab Results   Component Value Date    INDIA 9.7 06/26/2024    INDIA 9.5 04/25/2024    INDIA 9.4 04/19/2024           Imaging:    I personally reviewed all applicable imaging and went over the below findings with patient.    Results for orders placed or performed during the hospital encounter of 08/22/24   US Renal Complete Non-Vascular    Narrative    EXAM: US RENAL COMPLETE NON-VASCULAR  LOCATION: [facility]  DATE: 8/22/2024    INDICATION:  Right ureteral stone  COMPARISON: None.  TECHNIQUE: Routine Bilateral Renal and Bladder Ultrasound.    FINDINGS:    RIGHT KIDNEY: 10.6 x 5.7 x 4.0 cm. Normal without hydronephrosis or masses.     LEFT KIDNEY: 12.0 x 5.2 x 5.3 cm. Normal without hydronephrosis or masses.     BLADDER: Normal.      Impression    IMPRESSION:  1.  No hydronephrosis is seen.

## 2024-09-20 ENCOUNTER — MYC REFILL (OUTPATIENT)
Dept: FAMILY MEDICINE | Facility: CLINIC | Age: 59
End: 2024-09-20
Payer: COMMERCIAL

## 2024-09-20 DIAGNOSIS — R11.0 NAUSEA: ICD-10-CM

## 2024-09-20 RX ORDER — ONDANSETRON 4 MG/1
4 TABLET, ORALLY DISINTEGRATING ORAL EVERY 8 HOURS PRN
Qty: 30 TABLET | Refills: 0 | Status: SHIPPED | OUTPATIENT
Start: 2024-09-20

## 2024-12-05 ENCOUNTER — LAB (OUTPATIENT)
Dept: LAB | Facility: CLINIC | Age: 59
End: 2024-12-05
Payer: COMMERCIAL

## 2024-12-05 DIAGNOSIS — E87.6 HYPOKALEMIA: ICD-10-CM

## 2024-12-05 LAB — POTASSIUM SERPL-SCNC: 4 MMOL/L (ref 3.4–5.3)

## 2024-12-05 PROCEDURE — 84132 ASSAY OF SERUM POTASSIUM: CPT

## 2024-12-05 PROCEDURE — 36415 COLL VENOUS BLD VENIPUNCTURE: CPT

## 2024-12-26 ENCOUNTER — MYC REFILL (OUTPATIENT)
Dept: UROLOGY | Facility: CLINIC | Age: 59
End: 2024-12-26
Payer: COMMERCIAL

## 2024-12-26 DIAGNOSIS — R82.994 HYPERCALCIURIA: ICD-10-CM

## 2024-12-26 DIAGNOSIS — N20.0 CALCIUM OXALATE KIDNEY STONES: ICD-10-CM

## 2024-12-26 RX ORDER — POTASSIUM CITRATE 10 MEQ/1
20 TABLET, EXTENDED RELEASE ORAL 2 TIMES DAILY WITH MEALS
Qty: 240 TABLET | Refills: 1 | Status: SHIPPED | OUTPATIENT
Start: 2024-12-26

## 2025-02-24 ENCOUNTER — MYC REFILL (OUTPATIENT)
Dept: UROLOGY | Facility: CLINIC | Age: 60
End: 2025-02-24
Payer: COMMERCIAL

## 2025-02-24 DIAGNOSIS — N20.0 CALCIUM OXALATE KIDNEY STONES: ICD-10-CM

## 2025-02-24 DIAGNOSIS — R82.994 HYPERCALCIURIA: ICD-10-CM

## 2025-02-24 RX ORDER — POTASSIUM CITRATE 10 MEQ/1
20 TABLET, EXTENDED RELEASE ORAL 2 TIMES DAILY WITH MEALS
Qty: 240 TABLET | Refills: 0 | Status: SHIPPED | OUTPATIENT
Start: 2025-02-24

## 2025-03-04 DIAGNOSIS — R82.994 HYPERCALCIURIA: ICD-10-CM

## 2025-03-04 RX ORDER — CHLORTHALIDONE 25 MG/1
12.5 TABLET ORAL DAILY
Qty: 60 TABLET | Refills: 1 | Status: SHIPPED | OUTPATIENT
Start: 2025-03-04

## 2025-05-14 ENCOUNTER — MYC REFILL (OUTPATIENT)
Dept: FAMILY MEDICINE | Facility: CLINIC | Age: 60
End: 2025-05-14
Payer: COMMERCIAL

## 2025-05-14 ENCOUNTER — MYC REFILL (OUTPATIENT)
Dept: UROLOGY | Facility: CLINIC | Age: 60
End: 2025-05-14
Payer: COMMERCIAL

## 2025-05-14 DIAGNOSIS — R11.0 NAUSEA: ICD-10-CM

## 2025-05-14 DIAGNOSIS — R82.994 HYPERCALCIURIA: ICD-10-CM

## 2025-05-14 DIAGNOSIS — N20.0 CALCIUM OXALATE KIDNEY STONES: ICD-10-CM

## 2025-05-14 RX ORDER — POTASSIUM CITRATE 1080 MG/1
20 TABLET, EXTENDED RELEASE ORAL 2 TIMES DAILY WITH MEALS
Qty: 240 TABLET | Refills: 0 | Status: SHIPPED | OUTPATIENT
Start: 2025-05-14

## 2025-05-14 NOTE — TELEPHONE ENCOUNTER
Clinic RN: Please investigate patient's chart or contact patient if the information cannot be found because break of medication; last ordered 12/27/24

## 2025-05-19 NOTE — TELEPHONE ENCOUNTER
Patient reports that she takes ondansetron (ZOFRAN ODT) 4 MG ODT tab as needed. Pt uses it mostly for travel and pt has upcoming trip in June.    Medication pended for review.    MELINA Mancini  [facility]

## 2025-05-19 NOTE — TELEPHONE ENCOUNTER
Contacts       Contact Date/Time Type Contact Phone/Fax    05/14/2025 08:30 AM CDT Web (Incoming) Eden Kaufman (Self)     05/19/2025 02:14 PM CDT Phone (Outgoing) Eden Kaufman (Self) [phone] (M)    Left Message           Attempted to reach patient to: Collect additional information    Information needed: Patient was receiving ondansetron (Zofran)  prescription for nausea. Is patient still needing this prescription? How often is patient taking this medication?    When patient returns call, please take this action: Transfer to RN green line for possible triage.    Patricia Edmonds RN

## 2025-05-20 RX ORDER — ONDANSETRON 4 MG/1
4 TABLET, ORALLY DISINTEGRATING ORAL EVERY 8 HOURS PRN
Qty: 30 TABLET | Refills: 0 | Status: SHIPPED | OUTPATIENT
Start: 2025-05-20

## 2025-07-19 ENCOUNTER — MYC REFILL (OUTPATIENT)
Dept: FAMILY MEDICINE | Facility: CLINIC | Age: 60
End: 2025-07-19
Payer: COMMERCIAL

## 2025-07-19 ENCOUNTER — MYC REFILL (OUTPATIENT)
Dept: UROLOGY | Facility: CLINIC | Age: 60
End: 2025-07-19
Payer: COMMERCIAL

## 2025-07-19 DIAGNOSIS — R11.0 NAUSEA: ICD-10-CM

## 2025-07-19 DIAGNOSIS — N20.0 CALCIUM OXALATE KIDNEY STONES: ICD-10-CM

## 2025-07-19 DIAGNOSIS — R82.994 HYPERCALCIURIA: ICD-10-CM

## 2025-07-21 DIAGNOSIS — N20.0 NEPHROLITHIASIS: Primary | ICD-10-CM

## 2025-07-21 RX ORDER — POTASSIUM CITRATE 1080 MG/1
20 TABLET, EXTENDED RELEASE ORAL 2 TIMES DAILY WITH MEALS
Qty: 240 TABLET | Refills: 0 | Status: SHIPPED | OUTPATIENT
Start: 2025-07-21

## 2025-07-21 RX ORDER — ONDANSETRON 4 MG/1
4 TABLET, ORALLY DISINTEGRATING ORAL EVERY 8 HOURS PRN
Qty: 30 TABLET | Refills: 0 | Status: SHIPPED | OUTPATIENT
Start: 2025-07-21

## 2025-08-13 ENCOUNTER — HOSPITAL ENCOUNTER (OUTPATIENT)
Dept: MAMMOGRAPHY | Facility: CLINIC | Age: 60
Discharge: HOME | End: 2025-08-13
Attending: FAMILY MEDICINE
Payer: COMMERCIAL

## 2025-08-13 ENCOUNTER — HOSPITAL ENCOUNTER (OUTPATIENT)
Dept: CT IMAGING | Facility: CLINIC | Age: 60
Discharge: HOME | End: 2025-08-13
Attending: NURSE PRACTITIONER
Payer: COMMERCIAL

## 2025-08-13 DIAGNOSIS — N20.0 NEPHROLITHIASIS: ICD-10-CM

## 2025-08-13 DIAGNOSIS — Z12.31 SCREENING MAMMOGRAM, ENCOUNTER FOR: ICD-10-CM

## 2025-08-13 PROCEDURE — 74176 CT ABD & PELVIS W/O CONTRAST: CPT

## 2025-08-13 PROCEDURE — 77067 SCR MAMMO BI INCL CAD: CPT

## 2025-08-20 ENCOUNTER — VIRTUAL VISIT (OUTPATIENT)
Dept: UROLOGY | Facility: CLINIC | Age: 60
End: 2025-08-20
Payer: COMMERCIAL

## 2025-08-20 DIAGNOSIS — N20.0 NEPHROLITHIASIS: Primary | ICD-10-CM

## 2025-08-20 DIAGNOSIS — R82.994 HYPERCALCIURIA: ICD-10-CM

## 2025-08-20 DIAGNOSIS — N20.0 CALCIUM OXALATE KIDNEY STONES: ICD-10-CM

## 2025-08-20 PROCEDURE — 98005 SYNCH AUDIO-VIDEO EST LOW 20: CPT | Performed by: NURSE PRACTITIONER

## 2025-08-20 PROCEDURE — 1126F AMNT PAIN NOTED NONE PRSNT: CPT | Mod: 95 | Performed by: NURSE PRACTITIONER

## 2025-08-20 RX ORDER — CHLORTHALIDONE 25 MG/1
12.5 TABLET ORAL DAILY
Qty: 45 TABLET | Refills: 3 | Status: SHIPPED | OUTPATIENT
Start: 2025-08-20

## (undated) DEVICE — SHEATH INTRODUCER VIZIGO 17 MM SMALL CURVE D138501

## (undated) DEVICE — INTRO SHEATH 9FRX10CM PINNACLE RSS902

## (undated) DEVICE — Device

## (undated) DEVICE — CATH SOUNDSTAR 8FRX90CM 10439011

## (undated) DEVICE — CATH EP DECAPOLAR CS 7FR BI-DIRECTL FJ

## (undated) DEVICE — PATCH CARTO 3 EXTERNAL REFERENCE 3D MAPPING CREFP6

## (undated) DEVICE — NEEDLE 71CM NRG TRANSSEPTAL C0  8F FIX CURVE NRG-E-HF-71-C0

## (undated) DEVICE — SET FLUID DELIVERY 108IN H965913000091

## (undated) DEVICE — CATH NAV PENTARAY F CURVE

## (undated) DEVICE — GUIDEWIRE PROTRACK PGTL .025IN DIA 23

## (undated) DEVICE — TUBE SET SMARKABLATE IRRIGATION

## (undated) DEVICE — PACK EP SRG PROC LF DISP SAN32EPFSR

## (undated) DEVICE — CATH THERMOCOOL SMARTTOUCH SF FJ CURVE

## (undated) DEVICE — INTRO SHEATH 8FRX10CM PINNACLE RSS802

## (undated) DEVICE — CATH EP CATH EP REPRO DAIG RSPN FX CRV DX EP C

## (undated) DEVICE — VAMP PLUS SYSTEM RESERVOIR WITH 60IN PATIENT TUBING

## (undated) RX ORDER — FENTANYL CITRATE 50 UG/ML
INJECTION, SOLUTION INTRAMUSCULAR; INTRAVENOUS
Status: DISPENSED
Start: 2022-06-29

## (undated) RX ORDER — LIDOCAINE HYDROCHLORIDE 10 MG/ML
INJECTION, SOLUTION EPIDURAL; INFILTRATION; INTRACAUDAL; PERINEURAL
Status: DISPENSED
Start: 2022-06-29

## (undated) RX ORDER — HEPARIN SODIUM 200 [USP'U]/100ML
INJECTION, SOLUTION INTRAVENOUS
Status: DISPENSED
Start: 2022-06-29

## (undated) RX ORDER — ONDANSETRON 2 MG/ML
INJECTION INTRAMUSCULAR; INTRAVENOUS
Status: DISPENSED
Start: 2022-06-29

## (undated) RX ORDER — ADENOSINE 3 MG/ML
INJECTION, SOLUTION INTRAVENOUS
Status: DISPENSED
Start: 2022-06-29

## (undated) RX ORDER — HEPARIN SODIUM 1000 [USP'U]/ML
INJECTION, SOLUTION INTRAVENOUS; SUBCUTANEOUS
Status: DISPENSED
Start: 2022-06-29

## (undated) RX ORDER — HEPARIN SODIUM 10000 [USP'U]/100ML
INJECTION, SOLUTION INTRAVENOUS
Status: DISPENSED
Start: 2022-06-29